# Patient Record
Sex: FEMALE | Race: ASIAN | NOT HISPANIC OR LATINO | Employment: FULL TIME | URBAN - METROPOLITAN AREA
[De-identification: names, ages, dates, MRNs, and addresses within clinical notes are randomized per-mention and may not be internally consistent; named-entity substitution may affect disease eponyms.]

---

## 2017-05-26 ENCOUNTER — TRANSCRIBE ORDERS (OUTPATIENT)
Dept: ADMINISTRATIVE | Facility: HOSPITAL | Age: 42
End: 2017-05-26

## 2017-05-26 DIAGNOSIS — Z12.31 VISIT FOR SCREENING MAMMOGRAM: Primary | ICD-10-CM

## 2017-06-02 ENCOUNTER — HOSPITAL ENCOUNTER (OUTPATIENT)
Dept: MAMMOGRAPHY | Facility: HOSPITAL | Age: 42
Discharge: HOME | End: 2017-06-02
Attending: NURSE PRACTITIONER | Admitting: NURSE PRACTITIONER

## 2017-06-02 DIAGNOSIS — Z12.31 VISIT FOR SCREENING MAMMOGRAM: ICD-10-CM

## 2017-06-02 PROCEDURE — 77063 BREAST TOMOSYNTHESIS BI: CPT | Performed by: RADIOLOGY

## 2017-06-02 PROCEDURE — G0202 SCR MAMMO BI INCL CAD: HCPCS

## 2017-06-02 PROCEDURE — 77067 SCR MAMMO BI INCL CAD: CPT | Performed by: RADIOLOGY

## 2017-06-02 PROCEDURE — 77063 BREAST TOMOSYNTHESIS BI: CPT

## 2017-12-11 ENCOUNTER — APPOINTMENT (OUTPATIENT)
Dept: LAB | Facility: HOSPITAL | Age: 42
End: 2017-12-11

## 2017-12-11 ENCOUNTER — TRANSCRIBE ORDERS (OUTPATIENT)
Dept: LAB | Facility: HOSPITAL | Age: 42
End: 2017-12-11

## 2017-12-11 DIAGNOSIS — E55.9 AVITAMINOSIS D: Primary | ICD-10-CM

## 2017-12-11 LAB — 25(OH)D3 SERPL-MCNC: 43.5 NG/ML

## 2017-12-11 PROCEDURE — 36415 COLL VENOUS BLD VENIPUNCTURE: CPT | Performed by: NURSE PRACTITIONER

## 2017-12-11 PROCEDURE — 82306 VITAMIN D 25 HYDROXY: CPT | Performed by: NURSE PRACTITIONER

## 2017-12-12 ENCOUNTER — TRANSCRIBE ORDERS (OUTPATIENT)
Dept: ADMINISTRATIVE | Facility: HOSPITAL | Age: 42
End: 2017-12-12

## 2017-12-12 DIAGNOSIS — R22.31 AXILLARY MASS, RIGHT: Primary | ICD-10-CM

## 2017-12-29 ENCOUNTER — TRANSCRIBE ORDERS (OUTPATIENT)
Dept: ADMINISTRATIVE | Facility: HOSPITAL | Age: 42
End: 2017-12-29

## 2017-12-29 DIAGNOSIS — M79.621 PAIN IN AXILLA, RIGHT: ICD-10-CM

## 2017-12-29 DIAGNOSIS — M79.89 AXILLARY SWELLING: ICD-10-CM

## 2017-12-29 DIAGNOSIS — R22.30 AXILLARY FULLNESS: Primary | ICD-10-CM

## 2018-01-26 ENCOUNTER — HOSPITAL ENCOUNTER (OUTPATIENT)
Dept: ULTRASOUND IMAGING | Facility: HOSPITAL | Age: 43
Discharge: HOME | End: 2018-01-26

## 2018-01-26 ENCOUNTER — HOSPITAL ENCOUNTER (OUTPATIENT)
Dept: MAMMOGRAPHY | Facility: HOSPITAL | Age: 43
Discharge: HOME | End: 2018-01-26
Attending: NURSE PRACTITIONER | Admitting: NURSE PRACTITIONER

## 2018-01-26 DIAGNOSIS — M79.621 PAIN IN AXILLA, RIGHT: ICD-10-CM

## 2018-01-26 DIAGNOSIS — M79.89 AXILLARY SWELLING: ICD-10-CM

## 2018-01-26 DIAGNOSIS — R22.30 AXILLARY FULLNESS: ICD-10-CM

## 2018-01-26 PROCEDURE — 76642 ULTRASOUND BREAST LIMITED: CPT

## 2018-01-26 PROCEDURE — 77065 DX MAMMO INCL CAD UNI: CPT

## 2018-01-26 PROCEDURE — 76642 ULTRASOUND BREAST LIMITED: CPT | Performed by: RADIOLOGY

## 2018-01-26 PROCEDURE — G0279 TOMOSYNTHESIS, MAMMO: HCPCS

## 2018-01-26 PROCEDURE — 77061 BREAST TOMOSYNTHESIS UNI: CPT | Performed by: RADIOLOGY

## 2018-01-26 PROCEDURE — 77065 DX MAMMO INCL CAD UNI: CPT | Performed by: RADIOLOGY

## 2018-07-30 ENCOUNTER — TRANSCRIBE ORDERS (OUTPATIENT)
Dept: ADMINISTRATIVE | Facility: HOSPITAL | Age: 43
End: 2018-07-30

## 2018-07-30 DIAGNOSIS — Z12.31 VISIT FOR SCREENING MAMMOGRAM: Primary | ICD-10-CM

## 2018-07-31 ENCOUNTER — HOSPITAL ENCOUNTER (OUTPATIENT)
Dept: MAMMOGRAPHY | Facility: HOSPITAL | Age: 43
Discharge: HOME | End: 2018-07-31
Attending: NURSE PRACTITIONER | Admitting: NURSE PRACTITIONER

## 2018-07-31 DIAGNOSIS — Z12.31 VISIT FOR SCREENING MAMMOGRAM: ICD-10-CM

## 2018-07-31 PROCEDURE — 77063 BREAST TOMOSYNTHESIS BI: CPT | Performed by: RADIOLOGY

## 2018-07-31 PROCEDURE — 77067 SCR MAMMO BI INCL CAD: CPT

## 2018-07-31 PROCEDURE — 77067 SCR MAMMO BI INCL CAD: CPT | Performed by: RADIOLOGY

## 2018-07-31 PROCEDURE — 77063 BREAST TOMOSYNTHESIS BI: CPT

## 2018-10-09 ENCOUNTER — HOSPITAL ENCOUNTER (EMERGENCY)
Facility: HOSPITAL | Age: 43
Discharge: HOME | End: 2018-10-09
Attending: EMERGENCY MEDICINE | Admitting: EMERGENCY MEDICINE

## 2018-10-09 VITALS
SYSTOLIC BLOOD PRESSURE: 170 MMHG | HEIGHT: 62 IN | BODY MASS INDEX: 32.76 KG/M2 | WEIGHT: 178 LBS | DIASTOLIC BLOOD PRESSURE: 117 MMHG | OXYGEN SATURATION: 92 % | HEART RATE: 97 BPM | RESPIRATION RATE: 18 BRPM | TEMPERATURE: 98 F

## 2018-10-09 DIAGNOSIS — I10 ESSENTIAL HYPERTENSION: Primary | ICD-10-CM

## 2018-10-09 LAB
ALBUMIN SERPL-MCNC: 4.64 G/DL (ref 3.2–4.8)
ALBUMIN/GLOB SERPL: 1.5 G/DL (ref 1.5–2.5)
ALP SERPL-CCNC: 95 U/L (ref 25–100)
ALT SERPL W P-5'-P-CCNC: 28 U/L (ref 7–40)
ANION GAP SERPL CALCULATED.3IONS-SCNC: 8 MMOL/L (ref 3–11)
AST SERPL-CCNC: 32 U/L (ref 0–33)
BASOPHILS # BLD AUTO: 0.07 10*3/MM3 (ref 0–0.2)
BASOPHILS NFR BLD AUTO: 0.6 % (ref 0–1)
BILIRUB SERPL-MCNC: 0.4 MG/DL (ref 0.3–1.2)
BUN BLD-MCNC: 13 MG/DL (ref 9–23)
BUN/CREAT SERPL: 13.7 (ref 7–25)
CALCIUM SPEC-SCNC: 9.1 MG/DL (ref 8.7–10.4)
CHLORIDE SERPL-SCNC: 101 MMOL/L (ref 99–109)
CO2 SERPL-SCNC: 26 MMOL/L (ref 20–31)
CREAT BLD-MCNC: 0.95 MG/DL (ref 0.6–1.3)
DEPRECATED RDW RBC AUTO: 41.5 FL (ref 37–54)
EOSINOPHIL # BLD AUTO: 0.33 10*3/MM3 (ref 0–0.3)
EOSINOPHIL NFR BLD AUTO: 2.8 % (ref 0–3)
ERYTHROCYTE [DISTWIDTH] IN BLOOD BY AUTOMATED COUNT: 12.8 % (ref 11.3–14.5)
GFR SERPL CREATININE-BSD FRML MDRD: 64 ML/MIN/1.73
GFR SERPL CREATININE-BSD FRML MDRD: 77 ML/MIN/1.73
GLOBULIN UR ELPH-MCNC: 3.1 GM/DL
GLUCOSE BLD-MCNC: 119 MG/DL (ref 70–100)
HCT VFR BLD AUTO: 47.9 % (ref 34.5–44)
HGB BLD-MCNC: 16.5 G/DL (ref 11.5–15.5)
HOLD SPECIMEN: NORMAL
IMM GRANULOCYTES # BLD: 0.03 10*3/MM3 (ref 0–0.03)
IMM GRANULOCYTES NFR BLD: 0.3 % (ref 0–0.6)
LYMPHOCYTES # BLD AUTO: 4.58 10*3/MM3 (ref 0.6–4.8)
LYMPHOCYTES NFR BLD AUTO: 38.8 % (ref 24–44)
MCH RBC QN AUTO: 30.7 PG (ref 27–31)
MCHC RBC AUTO-ENTMCNC: 34.4 G/DL (ref 32–36)
MCV RBC AUTO: 89.2 FL (ref 80–99)
MONOCYTES # BLD AUTO: 0.97 10*3/MM3 (ref 0–1)
MONOCYTES NFR BLD AUTO: 8.2 % (ref 0–12)
NEUTROPHILS # BLD AUTO: 5.86 10*3/MM3 (ref 1.5–8.3)
NEUTROPHILS NFR BLD AUTO: 49.6 % (ref 41–71)
PLATELET # BLD AUTO: 441 10*3/MM3 (ref 150–450)
PMV BLD AUTO: 10.5 FL (ref 6–12)
POTASSIUM BLD-SCNC: 4 MMOL/L (ref 3.5–5.5)
PROT SERPL-MCNC: 7.7 G/DL (ref 5.7–8.2)
RBC # BLD AUTO: 5.37 10*6/MM3 (ref 3.89–5.14)
SODIUM BLD-SCNC: 135 MMOL/L (ref 132–146)
WBC NRBC COR # BLD: 11.81 10*3/MM3 (ref 3.5–10.8)
WHOLE BLOOD HOLD SPECIMEN: NORMAL
WHOLE BLOOD HOLD SPECIMEN: NORMAL

## 2018-10-09 PROCEDURE — 96374 THER/PROPH/DIAG INJ IV PUSH: CPT

## 2018-10-09 PROCEDURE — 80053 COMPREHEN METABOLIC PANEL: CPT | Performed by: EMERGENCY MEDICINE

## 2018-10-09 PROCEDURE — 85025 COMPLETE CBC W/AUTO DIFF WBC: CPT | Performed by: EMERGENCY MEDICINE

## 2018-10-09 PROCEDURE — 99284 EMERGENCY DEPT VISIT MOD MDM: CPT

## 2018-10-09 RX ORDER — PAROXETINE 10 MG/1
10 TABLET, FILM COATED ORAL
Status: ON HOLD | COMMUNITY
Start: 2017-12-08 | End: 2022-06-06

## 2018-10-09 RX ORDER — METOPROLOL TARTRATE 50 MG/1
50 TABLET, FILM COATED ORAL EVERY 12 HOURS SCHEDULED
Qty: 30 TABLET | Refills: 0 | Status: ON HOLD | OUTPATIENT
Start: 2018-10-09 | End: 2022-06-06

## 2018-10-09 RX ORDER — CLONIDINE HYDROCHLORIDE 0.1 MG/1
0.1 TABLET ORAL ONCE
Status: COMPLETED | OUTPATIENT
Start: 2018-10-09 | End: 2018-10-09

## 2018-10-09 RX ORDER — TOPIRAMATE 100 MG/1
100 TABLET, FILM COATED ORAL
COMMUNITY
Start: 2017-12-08 | End: 2020-11-07

## 2018-10-09 RX ORDER — LABETALOL HYDROCHLORIDE 5 MG/ML
10 INJECTION, SOLUTION INTRAVENOUS ONCE
Status: COMPLETED | OUTPATIENT
Start: 2018-10-09 | End: 2018-10-09

## 2018-10-09 RX ORDER — METHYLPHENIDATE HYDROCHLORIDE 27 MG/1
27 TABLET, EXTENDED RELEASE ORAL
COMMUNITY
Start: 2017-11-29 | End: 2020-11-07

## 2018-10-09 RX ORDER — LOSARTAN POTASSIUM AND HYDROCHLOROTHIAZIDE 25; 100 MG/1; MG/1
1 TABLET ORAL
Status: ON HOLD | COMMUNITY
Start: 2017-11-26 | End: 2022-06-06

## 2018-10-09 RX ORDER — SODIUM CHLORIDE 0.9 % (FLUSH) 0.9 %
10 SYRINGE (ML) INJECTION AS NEEDED
Status: DISCONTINUED | OUTPATIENT
Start: 2018-10-09 | End: 2018-10-09 | Stop reason: HOSPADM

## 2018-10-09 RX ORDER — MONTELUKAST SODIUM 10 MG/1
10 TABLET ORAL
Status: ON HOLD | COMMUNITY
End: 2022-06-06

## 2018-10-09 RX ORDER — FLUOXETINE 20 MG/1
TABLET, FILM COATED ORAL
COMMUNITY
End: 2020-11-07

## 2018-10-09 RX ADMIN — CLONIDINE HYDROCHLORIDE 0.1 MG: 0.1 TABLET ORAL at 18:06

## 2018-10-09 RX ADMIN — LABETALOL HYDROCHLORIDE 10 MG: 5 INJECTION INTRAVENOUS at 17:31

## 2018-10-09 NOTE — ED PROVIDER NOTES
[facility] EMERGENCY DEPARTMENT    eMERGENCY dEPARTMENT eNCOUnter      Pt Name: Rebekah Ga  MRN: [id number]  YOB: 1975  Date of evaluation: 10/9/2018  Provider: Jozef Becerra DO    CHIEF COMPLAINT       Chief Complaint   Patient presents with   • Hypertension         HISTORY OF PRESENT ILLNESS  (Location/Symptom, Timing/Onset, Context/Setting, Quality, Duration, Modifying Factors, Severity.)   Rebekah Ga is a 43 y.o. female who presents to the emergency department for evaluation of elevated blood pressure today today will she was at work.  She notes earlier filling lightheaded, somewhat dizzy, generally fatigued which she states she has no several last few days.  The patient does take blood pressure medication has been compliant with her medicines, although she does miss a couple doses here and there.  The patient denies chest pain or palpitations, earlier today she notes she still felt often check her blood pressure this afternoon was noted to be elevated 180s systolic over low 100s diastolic.  Denies any headache, no loss of vision, no unilateral weakness/numbness or tingling.  No fall, head injury or trauma.  Denies abdominal pain, no urinary/bowel complaints.       Nursing notes were reviewed.    REVIEW OF SYSTEMS    (2-9 systems for level 4, 10 or more for level 5)   ROS:  General:  No fevers, no chills, + generalized weakness  Cardiovascular:  No chest pain, no palpitations  Respiratory:  No shortness of breath, no cough, no wheezing  Gastrointestinal:  No pain, no nausea, no vomiting, no diarrhea  Musculoskeletal:  No muscle pain, no joint pain  Skin:  No rash, no easy bruising  Neurologic:  No speech problems, no headache, no extremity numbness, no extremity tingling, no extremity weakness  Psychiatric:  No anxiety  Genitourinary:  No dysuria, no hematuria    Except as noted above the remainder of the review of systems was reviewed and negative.       PAST MEDICAL  HISTORY   History reviewed. No pertinent past medical history.      SURGICAL HISTORY     History reviewed. No pertinent surgical history.      CURRENT MEDICATIONS        Medication List      START taking these medications    metoprolol tartrate 50 MG tablet  Commonly known as:  LOPRESSOR  Take 1 tablet by mouth Every 12 (Twelve) Hours.        CONTINUE taking these medications    CALCIUM CARBONATE-VITAMIN D3 PO     FLUoxetine 20 MG tablet  Commonly known as:  PROzac     losartan-hydrochlorothiazide 100-25 MG per tablet  Commonly known as:  HYZAAR     Methylphenidate HCl ER 27 MG tablet sustained-release 24 hour     montelukast 10 MG tablet  Commonly known as:  SINGULAIR     OXTELLAR  MG tablet sustained-release 24 hour  Generic drug:  OXcarbazepine ER     PARoxetine 10 MG tablet  Commonly known as:  PAXIL     topiramate 100 MG tablet  Commonly known as:  TOPAMAX          ALLERGIES     Aspirin and Penicillins    FAMILY HISTORY       Family History   Problem Relation Age of Onset   • Breast cancer Mother 76   • Ovarian cancer Neg Hx           SOCIAL HISTORY       Social History     Social History   • Marital status:      Social History Main Topics   • Smoking status: Never Smoker   • Smokeless tobacco: Never Used   • Alcohol use No   • Drug use: No   • Sexual activity: Defer     Other Topics Concern   • Not on file         PHYSICAL EXAM    (up to 7 for level 4, 8 or more for level 5)     Vitals:    10/09/18 1605 10/09/18 1730 10/09/18 1857 10/09/18 1900   BP: (!) 184/109 (!) 158/117 (!) 149/113 (!) 165/112   BP Location:       Patient Position:       Pulse: 97      Resp: 18      Temp:       TempSrc:       SpO2:  94%  96%   Weight:       Height:           Physical Exam  General :Patient is awake, alert, oriented, in no acute distress, nontoxic appearing  HEENT: Pupils are equally round and reactive to light, EOMI, conjunctivae clear, sclerae white, there is no injection no icterus.  Oral mucosa is moist, no  exudate. Uvula is midline  Neck: Neck is supple, full range of motion, trachea midline  Cardiac: Heart regular rate, rhythm, no murmurs, rubs, or gallops  Lungs: Lungs are clear to auscultation, there is no wheezing, rhonchi, or rales. There is no use of accessory muscles.  Chest wall: There is no tenderness to palpation over the chest wall or over ribs  Abdomen: Abdomen is soft, nontender, nondistended. There is no firm or pulsatile masses, no rebound rigidity or guarding.   Musculoskeletal: 5 out of 5 strength in all 4 extremities.  No focal muscle deficits are appreciated  Neuro: Motor intact, sensory intact, level of consciousness is normal, cerebellar function is normal GCS 15, no focal neurological deficit, good strength in bilateral upper extremities with good , no paresthesias.  5/5 in bilateral lower extremities.  Reflexes intact.      DIAGNOSTIC RESULTS     EKG: All EKG's are interpreted by the Emergency Department Physician who either signs or Co-signs this chart in the absence of a cardiologist.    No orders to display       RADIOLOGY:   Non-plain film images such as CT, Ultrasound and MRI are read by the radiologist. Plain radiographic images are visualized and preliminarily interpreted by the emergency physician with the below findings:      [] Radiologist's Report Reviewed:  No orders to display         ED BEDSIDE ULTRASOUND:   Performed by ED Physician - none    LABS:  Labs Reviewed   COMPREHENSIVE METABOLIC PANEL - Abnormal; Notable for the following:        Result Value    Glucose 119 (*)     All other components within normal limits    Narrative:     National Kidney [employer] Guidelines    Stage     Description        GFR  1         Normal or High     90+  2         Mild decrease      60-89  3         Moderate decrease  30-59  4         Severe decrease    15-29  5         Kidney failure     <15    The MDRD GFR formula is only valid for adults with stable renal function between ages 18 and 70.    CBC WITH AUTO DIFFERENTIAL - Abnormal; Notable for the following:     WBC 11.81 (*)     RBC 5.37 (*)     Hemoglobin 16.5 (*)     Hematocrit 47.9 (*)     Eosinophils, Absolute 0.33 (*)     All other components within normal limits   RAINBOW DRAW    Narrative:     The following orders were created for panel order [place] Draw.  Procedure                               Abnormality         Status                     ---------                               -----------         ------                     Light Blue Top[[id number]]                                    Final result               Green Top (Gel)[[id number]]                                                              Lavender Top[[id number]]                                      Final result               Gold Top - SST[[id number]]                                    Final result               Green Top (No Gel)[[id number]]                                                             Please view results for these tests on the individual orders.   LIGHT BLUE TOP   LAVENDER TOP   GOLD TOP - SST   CBC AND DIFFERENTIAL    Narrative:     The following orders were created for panel order CBC & Differential.  Procedure                               Abnormality         Status                     ---------                               -----------         ------                     CBC Auto Differential[[id number]]        Abnormal            Final result                 Please view results for these tests on the individual orders.       I have reviewed and interpreted all of the currently available lab results from this visit (if applicable):  Results for orders placed or performed during the hospital encounter of 10/09/18   Comprehensive Metabolic Panel   Result Value Ref Range    Glucose 119 (H) 70 - 100 mg/dL    BUN 13 9 - 23 mg/dL    Creatinine 0.95 0.60 - 1.30 mg/dL    Sodium 135 132 - 146 mmol/L    Potassium 4.0 3.5 - 5.5 mmol/L    Chloride 101 99 - 109 mmol/L    CO2 26.0 20.0  - 31.0 mmol/L    Calcium 9.1 8.7 - 10.4 mg/dL    Total Protein 7.7 5.7 - 8.2 g/dL    Albumin 4.64 3.20 - 4.80 g/dL    ALT (SGPT) 28 7 - 40 U/L    AST (SGOT) 32 0 - 33 U/L    Alkaline Phosphatase 95 25 - 100 U/L    Total Bilirubin 0.4 0.3 - 1.2 mg/dL    eGFR Non African Amer 64 >60 mL/min/1.73    eGFR  African Amer 77 >60 mL/min/1.73    Globulin 3.1 gm/dL    A/G Ratio 1.5 1.5 - 2.5 g/dL    BUN/Creatinine Ratio 13.7 7.0 - 25.0    Anion Gap 8.0 3.0 - 11.0 mmol/L   CBC Auto Differential   Result Value Ref Range    WBC 11.81 (H) 3.50 - 10.80 10*3/mm3    RBC 5.37 (H) 3.89 - 5.14 10*6/mm3    Hemoglobin 16.5 (H) 11.5 - 15.5 g/dL    Hematocrit 47.9 (H) 34.5 - 44.0 %    MCV 89.2 80.0 - 99.0 fL    MCH 30.7 27.0 - 31.0 pg    MCHC 34.4 32.0 - 36.0 g/dL    RDW 12.8 11.3 - 14.5 %    RDW-SD 41.5 37.0 - 54.0 fl    MPV 10.5 6.0 - 12.0 fL    Platelets 441 150 - 450 10*3/mm3    Neutrophil % 49.6 41.0 - 71.0 %    Lymphocyte % 38.8 24.0 - 44.0 %    Monocyte % 8.2 0.0 - 12.0 %    Eosinophil % 2.8 0.0 - 3.0 %    Basophil % 0.6 0.0 - 1.0 %    Immature Grans % 0.3 0.0 - 0.6 %    Neutrophils, Absolute 5.86 1.50 - 8.30 10*3/mm3    Lymphocytes, Absolute 4.58 0.60 - 4.80 10*3/mm3    Monocytes, Absolute 0.97 0.00 - 1.00 10*3/mm3    Eosinophils, Absolute 0.33 (H) 0.00 - 0.30 10*3/mm3    Basophils, Absolute 0.07 0.00 - 0.20 10*3/mm3    Immature Grans, Absolute 0.03 0.00 - 0.03 10*3/mm3   Light Blue Top   Result Value Ref Range    Extra Tube hold for add-on    Lavender Top   Result Value Ref Range    Extra Tube hold for add-on    Gold Top - SST   Result Value Ref Range    Extra Tube Hold for add-ons.         All other labs were within normal range or not returned as of this dictation.    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  Neurology alert and oriented without any focal neurological deficit, blood pressure is elevated around 180 over 100s.with discussed given her consistently elevated pressures with mild to moderate symptoms that we would obtain IV, labs, give her labetalol 10 mg, clonidine 0.1 mg.  Labs unremarkable, kidney function intact.  Patient denies a family history of any neurological issues, no history of aneurysms.  Denies a sudden onset of headache, no vision changes.  On reevaluation, systolic pressures improved, diastolic pressure has improved somewhat, but still remains elevated.  I discussed the patient should continue with her current antihypertensive medications but we should add on additional medicine, beta blocker to help further improve her blood pressure.  No sudden onset headache, no focal neurological deficit.  She will continue to monitor and write down her blood pressures in the morning, afternoon and night until she can follow-up with her PCP in a couple days for reevaluation and recheck of her pressures.  If she has any pressures greater than 220/120 she should return back to the emergency department for reevaluation.  The patient and her significant other understand return precautions discussed and are in agreement. The patient will follow-up with their PCP in 1-2 days for reevaluation.  If the patient or family members have any further concerns or any worsening symptoms they will return to the ED for reevaluation.          PROCEDURES:  Procedures    CRITICAL CARE TIME    Total Critical Care time was 0 minutes, excluding separately reportable procedures.   There was a high probability of clinically significant/life threatening deterioration in the patient's condition which required my  urgent intervention.      FINAL IMPRESSION      1. Essential hypertension          DISPOSITION/PLAN     ED Disposition     ED Disposition Condition Comment    Discharge Stable           PATIENT REFERRED TO:  Bonny Quinonez  [address]  [address] [place]  [place] [address] [phone]    In 2 days  For reevaluation, recheck of her blood pressure, medication adjustments as needed.  Also referral to your neurologist    [facility] Emergency Department  [address] [facility] [address] [facility] [address] [phone]    If symptoms worsen, or if you are taking your blood pressures at home and they continue to be elevated. Greater than 220/120. Or is any of your symptoms worsen.      DISCHARGE MEDICATIONS:     Medication List      START taking these medications    metoprolol tartrate 50 MG tablet  Commonly known as:  LOPRESSOR  Take 1 tablet by mouth Every 12 (Twelve) Hours.        CONTINUE taking these medications    CALCIUM CARBONATE-VITAMIN D3 PO     FLUoxetine 20 MG tablet  Commonly known as:  PROzac     losartan-hydrochlorothiazide 100-25 MG per tablet  Commonly known as:  HYZAAR     Methylphenidate HCl ER 27 MG tablet sustained-release 24 hour     montelukast 10 MG tablet  Commonly known as:  SINGULAIR     OXTELLAR  MG tablet sustained-release 24 hour  Generic drug:  OXcarbazepine ER     PARoxetine 10 MG tablet  Commonly known as:  PAXIL     topiramate 100 MG tablet  Commonly known as:  TOPAMAX          Comment: Please note this report has been produced using speech recognition software and may contain errors related to that system including errors in grammar, punctuation, and spelling, as well as words and phrases that may be inappropriate. If there are any questions or concerns please feel free to contact the dictating provider for clarification.    Jozef Becerra DO  Attending Emergency Physician               Jozef Becerra DO  10/09/18 9221

## 2019-01-25 ENCOUNTER — LAB (OUTPATIENT)
Dept: LAB | Facility: HOSPITAL | Age: 44
End: 2019-01-25

## 2019-01-25 ENCOUNTER — TRANSCRIBE ORDERS (OUTPATIENT)
Dept: LAB | Facility: HOSPITAL | Age: 44
End: 2019-01-25

## 2019-01-25 DIAGNOSIS — R53.83 FATIGUE, UNSPECIFIED TYPE: Primary | ICD-10-CM

## 2019-01-25 DIAGNOSIS — R53.83 FATIGUE, UNSPECIFIED TYPE: ICD-10-CM

## 2019-01-25 LAB
25(OH)D3 SERPL-MCNC: 36.4 NG/ML
ALBUMIN SERPL-MCNC: 4.61 G/DL (ref 3.2–4.8)
ALBUMIN/GLOB SERPL: 1.8 G/DL (ref 1.5–2.5)
ALP SERPL-CCNC: 79 U/L (ref 25–100)
ALT SERPL W P-5'-P-CCNC: 19 U/L (ref 7–40)
ANION GAP SERPL CALCULATED.3IONS-SCNC: 7 MMOL/L (ref 3–11)
AST SERPL-CCNC: 19 U/L (ref 0–33)
BILIRUB SERPL-MCNC: 0.8 MG/DL (ref 0.3–1.2)
BUN BLD-MCNC: 15 MG/DL (ref 9–23)
BUN/CREAT SERPL: 17.6 (ref 7–25)
CALCIUM SPEC-SCNC: 9.6 MG/DL (ref 8.7–10.4)
CHLORIDE SERPL-SCNC: 105 MMOL/L (ref 99–109)
CO2 SERPL-SCNC: 25 MMOL/L (ref 20–31)
CREAT BLD-MCNC: 0.85 MG/DL (ref 0.6–1.3)
DEPRECATED RDW RBC AUTO: 44.1 FL (ref 37–54)
ERYTHROCYTE [DISTWIDTH] IN BLOOD BY AUTOMATED COUNT: 13.4 % (ref 11.3–14.5)
GFR SERPL CREATININE-BSD FRML MDRD: 72 ML/MIN/1.73
GFR SERPL CREATININE-BSD FRML MDRD: 88 ML/MIN/1.73
GLOBULIN UR ELPH-MCNC: 2.6 GM/DL
GLUCOSE BLD-MCNC: 94 MG/DL (ref 70–100)
HBA1C MFR BLD: 5.8 % (ref 4.8–5.6)
HCT VFR BLD AUTO: 46.4 % (ref 34.5–44)
HGB BLD-MCNC: 15.9 G/DL (ref 11.5–15.5)
MCH RBC QN AUTO: 31.4 PG (ref 27–31)
MCHC RBC AUTO-ENTMCNC: 34.3 G/DL (ref 32–36)
MCV RBC AUTO: 91.5 FL (ref 80–99)
PLATELET # BLD AUTO: 431 10*3/MM3 (ref 150–450)
PMV BLD AUTO: 10.1 FL (ref 6–12)
POTASSIUM BLD-SCNC: 4.3 MMOL/L (ref 3.5–5.5)
PROT SERPL-MCNC: 7.2 G/DL (ref 5.7–8.2)
RBC # BLD AUTO: 5.07 10*6/MM3 (ref 3.89–5.14)
SODIUM BLD-SCNC: 137 MMOL/L (ref 132–146)
T4 FREE SERPL-MCNC: 1.13 NG/DL (ref 0.89–1.76)
TSH SERPL DL<=0.05 MIU/L-ACNC: 2.17 MIU/ML (ref 0.35–5.35)
VIT B12 BLD-MCNC: 625 PG/ML (ref 211–911)
WBC NRBC COR # BLD: 10.84 10*3/MM3 (ref 3.5–10.8)

## 2019-01-25 PROCEDURE — 83036 HEMOGLOBIN GLYCOSYLATED A1C: CPT

## 2019-01-25 PROCEDURE — 84439 ASSAY OF FREE THYROXINE: CPT

## 2019-01-25 PROCEDURE — 82306 VITAMIN D 25 HYDROXY: CPT

## 2019-01-25 PROCEDURE — 82607 VITAMIN B-12: CPT

## 2019-01-25 PROCEDURE — 85027 COMPLETE CBC AUTOMATED: CPT

## 2019-01-25 PROCEDURE — 36415 COLL VENOUS BLD VENIPUNCTURE: CPT

## 2019-01-25 PROCEDURE — 84443 ASSAY THYROID STIM HORMONE: CPT

## 2019-01-25 PROCEDURE — 80053 COMPREHEN METABOLIC PANEL: CPT

## 2019-08-16 ENCOUNTER — LAB (OUTPATIENT)
Dept: LAB | Facility: HOSPITAL | Age: 44
End: 2019-08-16

## 2019-08-16 ENCOUNTER — TRANSCRIBE ORDERS (OUTPATIENT)
Dept: LAB | Facility: HOSPITAL | Age: 44
End: 2019-08-16

## 2019-08-16 DIAGNOSIS — R23.2 HOT FLASHES: Primary | ICD-10-CM

## 2019-08-16 DIAGNOSIS — R23.2 HOT FLASHES: ICD-10-CM

## 2019-08-16 LAB
DEPRECATED RDW RBC AUTO: 46.6 FL (ref 37–54)
ERYTHROCYTE [DISTWIDTH] IN BLOOD BY AUTOMATED COUNT: 13.4 % (ref 12.3–15.4)
HCT VFR BLD AUTO: 48.8 % (ref 34–46.6)
HGB BLD-MCNC: 16 G/DL (ref 12–15.9)
MCH RBC QN AUTO: 30.9 PG (ref 26.6–33)
MCHC RBC AUTO-ENTMCNC: 32.8 G/DL (ref 31.5–35.7)
MCV RBC AUTO: 94.2 FL (ref 79–97)
PLATELET # BLD AUTO: 424 10*3/MM3 (ref 140–450)
PMV BLD AUTO: 10.3 FL (ref 6–12)
RBC # BLD AUTO: 5.18 10*6/MM3 (ref 3.77–5.28)
TSH SERPL DL<=0.05 MIU/L-ACNC: 2.8 MIU/ML (ref 0.27–4.2)
WBC NRBC COR # BLD: 9.76 10*3/MM3 (ref 3.4–10.8)

## 2019-08-16 PROCEDURE — 84443 ASSAY THYROID STIM HORMONE: CPT

## 2019-08-16 PROCEDURE — 36415 COLL VENOUS BLD VENIPUNCTURE: CPT

## 2019-08-16 PROCEDURE — 85027 COMPLETE CBC AUTOMATED: CPT

## 2020-02-05 ENCOUNTER — TRANSCRIBE ORDERS (OUTPATIENT)
Dept: ADMINISTRATIVE | Facility: HOSPITAL | Age: 45
End: 2020-02-05

## 2020-02-05 DIAGNOSIS — M79.621 PAIN IN RIGHT AXILLA: ICD-10-CM

## 2020-02-05 DIAGNOSIS — R22.31 LUMP OF AXILLA, RIGHT: Primary | ICD-10-CM

## 2020-02-05 DIAGNOSIS — N63.31 LUMP OF AXILLARY TAIL OF RIGHT BREAST: ICD-10-CM

## 2020-10-22 ENCOUNTER — TRANSCRIBE ORDERS (OUTPATIENT)
Dept: ADMINISTRATIVE | Facility: HOSPITAL | Age: 45
End: 2020-10-22

## 2020-10-22 DIAGNOSIS — Z12.31 VISIT FOR SCREENING MAMMOGRAM: Primary | ICD-10-CM

## 2020-11-07 ENCOUNTER — APPOINTMENT (OUTPATIENT)
Dept: CT IMAGING | Facility: HOSPITAL | Age: 45
End: 2020-11-07

## 2020-11-07 ENCOUNTER — APPOINTMENT (OUTPATIENT)
Dept: GENERAL RADIOLOGY | Facility: HOSPITAL | Age: 45
End: 2020-11-07

## 2020-11-07 ENCOUNTER — HOSPITAL ENCOUNTER (EMERGENCY)
Facility: HOSPITAL | Age: 45
Discharge: HOME | End: 2020-11-07
Attending: EMERGENCY MEDICINE | Admitting: EMERGENCY MEDICINE

## 2020-11-07 VITALS
HEIGHT: 62 IN | WEIGHT: 176 LBS | TEMPERATURE: 97.8 F | DIASTOLIC BLOOD PRESSURE: 96 MMHG | HEART RATE: 80 BPM | OXYGEN SATURATION: 97 % | BODY MASS INDEX: 32.39 KG/M2 | RESPIRATION RATE: 15 BRPM | SYSTOLIC BLOOD PRESSURE: 154 MMHG

## 2020-11-07 DIAGNOSIS — S16.1XXA ACUTE CERVICAL MYOFASCIAL STRAIN, INITIAL ENCOUNTER: ICD-10-CM
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
## 2020-11-07 DIAGNOSIS — S80.11XA CONTUSION OF RIGHT TIBIA: ICD-10-CM

## 2020-11-07 PROCEDURE — 99283 EMERGENCY DEPT VISIT LOW MDM: CPT

## 2020-11-07 PROCEDURE — 72125 CT NECK SPINE W/O DYE: CPT

## 2020-11-07 PROCEDURE — 73590 X-RAY EXAM OF LOWER LEG: CPT

## 2020-11-07 RX ORDER — CYCLOBENZAPRINE HCL 10 MG
10 TABLET ORAL 3 TIMES DAILY PRN
Qty: 12 TABLET | Refills: 0 | Status: SHIPPED | OUTPATIENT
Start: 2020-11-07 | End: 2020-11-07 | Stop reason: SDUPTHER

## 2020-11-07 RX ORDER — CYCLOBENZAPRINE HCL 10 MG
10 TABLET ORAL 3 TIMES DAILY PRN
Qty: 12 TABLET | Refills: 0 | Status: ON HOLD | OUTPATIENT
Start: 2020-11-07 | End: 2022-06-06
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
## 2020-11-18 ENCOUNTER — APPOINTMENT (OUTPATIENT)
Dept: PREADMISSION TESTING | Facility: HOSPITAL | Age: 45
End: 2020-11-18

## 2020-11-18 PROCEDURE — C9803 HOPD COVID-19 SPEC COLLECT: HCPCS

## 2020-11-18 PROCEDURE — U0004 COV-19 TEST NON-CDC HGH THRU: HCPCS

## 2020-11-19 ENCOUNTER — HOSPITAL ENCOUNTER (OUTPATIENT)
Dept: MAMMOGRAPHY | Facility: HOSPITAL | Age: 45
Discharge: HOME | End: 2020-11-19

## 2020-11-19 ENCOUNTER — HOSPITAL ENCOUNTER (OUTPATIENT)
Dept: ULTRASOUND IMAGING | Facility: HOSPITAL | Age: 45
Discharge: HOME | End: 2020-11-19

## 2020-11-19 DIAGNOSIS — N63.31 LUMP OF AXILLARY TAIL OF RIGHT BREAST: ICD-10-CM

## 2020-11-19 LAB — SARS-COV-2 RNA RESP QL NAA+PROBE: NOT DETECTED

## 2020-11-19 PROCEDURE — 76642 ULTRASOUND BREAST LIMITED: CPT

## 2020-11-19 PROCEDURE — 77062 BREAST TOMOSYNTHESIS BI: CPT | Performed by: RADIOLOGY

## 2020-11-19 PROCEDURE — G0279 TOMOSYNTHESIS, MAMMO: HCPCS

## 2020-11-19 PROCEDURE — 77066 DX MAMMO INCL CAD BI: CPT

## 2020-11-19 PROCEDURE — 77066 DX MAMMO INCL CAD BI: CPT | Performed by: RADIOLOGY

## 2020-11-19 PROCEDURE — 76642 ULTRASOUND BREAST LIMITED: CPT | Performed by: RADIOLOGY

## 2020-11-20 ENCOUNTER — LAB REQUISITION (OUTPATIENT)
Dept: LAB | Facility: HOSPITAL | Age: 45
End: 2020-11-20

## 2020-11-20 ENCOUNTER — APPOINTMENT (OUTPATIENT)
Dept: MAMMOGRAPHY | Facility: HOSPITAL | Age: 45
End: 2020-11-20

## 2020-11-20 DIAGNOSIS — M67.40 GANGLION, UNSPECIFIED SITE: ICD-10-CM

## 2020-11-20 PROCEDURE — 88304 TISSUE EXAM BY PATHOLOGIST: CPT | Performed by: PLASTIC SURGERY

## 2020-11-23 LAB
CYTO UR: NORMAL
LAB AP CASE REPORT: NORMAL
LAB AP CLINICAL INFORMATION: NORMAL
PATH REPORT.FINAL DX SPEC: NORMAL
PATH REPORT.GROSS SPEC: NORMAL

## 2020-12-20 ENCOUNTER — APPOINTMENT (OUTPATIENT)
Dept: PREADMISSION TESTING | Facility: HOSPITAL | Age: 45
End: 2020-12-20

## 2021-01-26 ENCOUNTER — APPOINTMENT (OUTPATIENT)
Dept: MAMMOGRAPHY | Facility: HOSPITAL | Age: 46
End: 2021-01-26

## 2022-02-14 ENCOUNTER — SPECIALTY PHARMACY (OUTPATIENT)
Dept: PHARMACY | Facility: TELEHEALTH | Age: 47
End: 2022-02-14

## 2022-02-17 ENCOUNTER — SPECIALTY PHARMACY (OUTPATIENT)
Dept: PHARMACY | Facility: TELEHEALTH | Age: 47
End: 2022-02-17

## 2022-02-21 ENCOUNTER — SPECIALTY PHARMACY (OUTPATIENT)
Dept: PHARMACY | Facility: TELEHEALTH | Age: 47
End: 2022-02-21

## 2022-02-21 NOTE — PROGRESS NOTES
Left messages for pt x4. Sent [employer] message that was not read. Salty profiled the RX's.     Mildred Rodriguez, Pharmacy Technician  Specialty Pharmacy Technician

## 2022-06-05 ENCOUNTER — HOSPITAL ENCOUNTER (INPATIENT)
Facility: HOSPITAL | Age: 47
LOS: 4 days | Discharge: HOME | End: 2022-06-10
Attending: EMERGENCY MEDICINE | Admitting: STUDENT IN AN ORGANIZED HEALTH CARE EDUCATION/TRAINING PROGRAM

## 2022-06-05 ENCOUNTER — APPOINTMENT (OUTPATIENT)
Dept: GENERAL RADIOLOGY | Facility: HOSPITAL | Age: 47
End: 2022-06-05

## 2022-06-05 DIAGNOSIS — K85.90 ACUTE PANCREATITIS, UNSPECIFIED COMPLICATION STATUS, UNSPECIFIED PANCREATITIS TYPE: Primary | ICD-10-CM

## 2022-06-05 DIAGNOSIS — R10.10 ACUTE UPPER ABDOMINAL PAIN: ICD-10-CM

## 2022-06-05 DIAGNOSIS — R11.2 NAUSEA AND VOMITING, UNSPECIFIED VOMITING TYPE: ICD-10-CM

## 2022-06-05 LAB
ALBUMIN SERPL-MCNC: 4.3 G/DL (ref 3.5–5.2)
ALBUMIN/GLOB SERPL: 1.3 G/DL
ALP SERPL-CCNC: 94 U/L (ref 39–117)
ALT SERPL W P-5'-P-CCNC: 33 U/L (ref 1–33)
ANION GAP SERPL CALCULATED.3IONS-SCNC: 10 MMOL/L (ref 5–15)
AST SERPL-CCNC: 31 U/L (ref 1–32)
B-HCG UR QL: NEGATIVE
BACTERIA UR QL AUTO: ABNORMAL /HPF
BASOPHILS # BLD AUTO: 0.09 10*3/MM3 (ref 0–0.2)
BASOPHILS NFR BLD AUTO: 0.5 % (ref 0–1.5)
BILIRUB SERPL-MCNC: 0.6 MG/DL (ref 0–1.2)
BILIRUB UR QL STRIP: NEGATIVE
BUN SERPL-MCNC: 10 MG/DL (ref 6–20)
BUN/CREAT SERPL: 13.3 (ref 7–25)
CALCIUM SPEC-SCNC: 9.1 MG/DL (ref 8.6–10.5)
CHLORIDE SERPL-SCNC: 100 MMOL/L (ref 98–107)
CLARITY UR: CLEAR
CO2 SERPL-SCNC: 26 MMOL/L (ref 22–29)
COLOR UR: YELLOW
CREAT SERPL-MCNC: 0.75 MG/DL (ref 0.57–1)
DEPRECATED RDW RBC AUTO: 40.7 FL (ref 37–54)
EGFRCR SERPLBLD CKD-EPI 2021: 97.7 ML/MIN/1.73
EOSINOPHIL # BLD AUTO: 0.35 10*3/MM3 (ref 0–0.4)
EOSINOPHIL NFR BLD AUTO: 2.1 % (ref 0.3–6.2)
ERYTHROCYTE [DISTWIDTH] IN BLOOD BY AUTOMATED COUNT: 12.4 % (ref 12.3–15.4)
EXPIRATION DATE: NORMAL
GLOBULIN UR ELPH-MCNC: 3.4 GM/DL
GLUCOSE SERPL-MCNC: 121 MG/DL (ref 65–99)
GLUCOSE UR STRIP-MCNC: NEGATIVE MG/DL
HCT VFR BLD AUTO: 47 % (ref 34–46.6)
HGB BLD-MCNC: 16.1 G/DL (ref 12–15.9)
HGB UR QL STRIP.AUTO: ABNORMAL
HOLD SPECIMEN: NORMAL
HOLD SPECIMEN: NORMAL
HYALINE CASTS UR QL AUTO: ABNORMAL /LPF
IMM GRANULOCYTES # BLD AUTO: 0.08 10*3/MM3 (ref 0–0.05)
IMM GRANULOCYTES NFR BLD AUTO: 0.5 % (ref 0–0.5)
INTERNAL NEGATIVE CONTROL: NEGATIVE
INTERNAL POSITIVE CONTROL: POSITIVE
KETONES UR QL STRIP: NEGATIVE
LEUKOCYTE ESTERASE UR QL STRIP.AUTO: NEGATIVE
LIPASE SERPL-CCNC: 401 U/L (ref 13–60)
LYMPHOCYTES # BLD AUTO: 3.77 10*3/MM3 (ref 0.7–3.1)
LYMPHOCYTES NFR BLD AUTO: 22.5 % (ref 19.6–45.3)
Lab: NORMAL
MCH RBC QN AUTO: 30.9 PG (ref 26.6–33)
MCHC RBC AUTO-ENTMCNC: 34.3 G/DL (ref 31.5–35.7)
MCV RBC AUTO: 90.2 FL (ref 79–97)
MONOCYTES # BLD AUTO: 1.78 10*3/MM3 (ref 0.1–0.9)
MONOCYTES NFR BLD AUTO: 10.6 % (ref 5–12)
NEUTROPHILS NFR BLD AUTO: 10.65 10*3/MM3 (ref 1.7–7)
NEUTROPHILS NFR BLD AUTO: 63.8 % (ref 42.7–76)
NITRITE UR QL STRIP: NEGATIVE
NRBC BLD AUTO-RTO: 0 /100 WBC (ref 0–0.2)
PH UR STRIP.AUTO: 7 [PH] (ref 5–8)
PLATELET # BLD AUTO: 413 10*3/MM3 (ref 140–450)
PMV BLD AUTO: 9.7 FL (ref 6–12)
POTASSIUM SERPL-SCNC: 3.8 MMOL/L (ref 3.5–5.2)
PROT SERPL-MCNC: 7.7 G/DL (ref 6–8.5)
PROT UR QL STRIP: NEGATIVE
RBC # BLD AUTO: 5.21 10*6/MM3 (ref 3.77–5.28)
RBC # UR STRIP: ABNORMAL /HPF
REF LAB TEST METHOD: ABNORMAL
SODIUM SERPL-SCNC: 136 MMOL/L (ref 136–145)
SP GR UR STRIP: 1.01 (ref 1–1.03)
SQUAMOUS #/AREA URNS HPF: ABNORMAL /HPF
TROPONIN T SERPL-MCNC: <0.01 NG/ML (ref 0–0.03)
UROBILINOGEN UR QL STRIP: ABNORMAL
WBC # UR STRIP: ABNORMAL /HPF
WBC NRBC COR # BLD: 16.72 10*3/MM3 (ref 3.4–10.8)
WHOLE BLOOD HOLD COAG: NORMAL
WHOLE BLOOD HOLD SPECIMEN: NORMAL

## 2022-06-05 PROCEDURE — 84478 ASSAY OF TRIGLYCERIDES: CPT | Performed by: FAMILY MEDICINE

## 2022-06-05 PROCEDURE — 81001 URINALYSIS AUTO W/SCOPE: CPT | Performed by: EMERGENCY MEDICINE

## 2022-06-05 PROCEDURE — 84484 ASSAY OF TROPONIN QUANT: CPT | Performed by: EMERGENCY MEDICINE

## 2022-06-05 PROCEDURE — 83735 ASSAY OF MAGNESIUM: CPT | Performed by: NURSE PRACTITIONER

## 2022-06-05 PROCEDURE — 71045 X-RAY EXAM CHEST 1 VIEW: CPT

## 2022-06-05 PROCEDURE — 93005 ELECTROCARDIOGRAM TRACING: CPT | Performed by: EMERGENCY MEDICINE

## 2022-06-05 PROCEDURE — 83605 ASSAY OF LACTIC ACID: CPT | Performed by: NURSE PRACTITIONER

## 2022-06-05 PROCEDURE — 83690 ASSAY OF LIPASE: CPT | Performed by: EMERGENCY MEDICINE

## 2022-06-05 PROCEDURE — 99285 EMERGENCY DEPT VISIT HI MDM: CPT

## 2022-06-05 PROCEDURE — 85025 COMPLETE CBC W/AUTO DIFF WBC: CPT

## 2022-06-05 PROCEDURE — 36415 COLL VENOUS BLD VENIPUNCTURE: CPT

## 2022-06-05 PROCEDURE — 81025 URINE PREGNANCY TEST: CPT | Performed by: EMERGENCY MEDICINE

## 2022-06-05 PROCEDURE — 84145 PROCALCITONIN (PCT): CPT | Performed by: NURSE PRACTITIONER

## 2022-06-05 PROCEDURE — 80053 COMPREHEN METABOLIC PANEL: CPT | Performed by: EMERGENCY MEDICINE

## 2022-06-05 RX ORDER — SODIUM CHLORIDE 0.9 % (FLUSH) 0.9 %
10 SYRINGE (ML) INJECTION AS NEEDED
Status: DISCONTINUED | OUTPATIENT
Start: 2022-06-05 | End: 2022-06-10 | Stop reason: HOSPADM

## 2022-06-06 ENCOUNTER — APPOINTMENT (OUTPATIENT)
Dept: CT IMAGING | Facility: HOSPITAL | Age: 47
End: 2022-06-06
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
## 2022-06-06 PROBLEM — K85.90 ACUTE PANCREATITIS: Status: ACTIVE | Noted: 2022-06-06

## 2022-06-06 PROBLEM — I10 HYPERTENSION: Status: ACTIVE | Noted: 2022-06-06

## 2022-06-06 PROBLEM — F31.9 BIPOLAR AFFECTIVE: Status: ACTIVE | Noted: 2022-06-06

## 2022-06-06 PROBLEM — G43.909 MIGRAINE: Status: ACTIVE | Noted: 2022-06-06

## 2022-06-06 PROBLEM — M54.2 NECK PAIN, ACUTE: Status: ACTIVE | Noted: 2022-06-06

## 2022-06-06 LAB
ALBUMIN SERPL-MCNC: 3.5 G/DL (ref 3.5–5.2)
ALBUMIN/GLOB SERPL: 1.1 G/DL
ALP SERPL-CCNC: 78 U/L (ref 39–117)
ALT SERPL W P-5'-P-CCNC: 26 U/L (ref 1–33)
ANION GAP SERPL CALCULATED.3IONS-SCNC: 10 MMOL/L (ref 5–15)
AST SERPL-CCNC: 25 U/L (ref 1–32)
BASOPHILS # BLD AUTO: 0.07 10*3/MM3 (ref 0–0.2)
BASOPHILS NFR BLD AUTO: 0.4 % (ref 0–1.5)
BILIRUB SERPL-MCNC: 1 MG/DL (ref 0–1.2)
BUN SERPL-MCNC: 8 MG/DL (ref 6–20)
BUN/CREAT SERPL: 11.8 (ref 7–25)
CALCIUM SPEC-SCNC: 8.5 MG/DL (ref 8.6–10.5)
CHLORIDE SERPL-SCNC: 102 MMOL/L (ref 98–107)
CHOLEST SERPL-MCNC: 202 MG/DL (ref 0–200)
CO2 SERPL-SCNC: 23 MMOL/L (ref 22–29)
CREAT SERPL-MCNC: 0.68 MG/DL (ref 0.57–1)
D-LACTATE SERPL-SCNC: 1.7 MMOL/L (ref 0.5–2)
DEPRECATED RDW RBC AUTO: 40.3 FL (ref 37–54)
EGFRCR SERPLBLD CKD-EPI 2021: 106.9 ML/MIN/1.73
EOSINOPHIL # BLD AUTO: 0.32 10*3/MM3 (ref 0–0.4)
EOSINOPHIL NFR BLD AUTO: 1.7 % (ref 0.3–6.2)
ERYTHROCYTE [DISTWIDTH] IN BLOOD BY AUTOMATED COUNT: 12.3 % (ref 12.3–15.4)
FLUAV RNA RESP QL NAA+PROBE: NOT DETECTED
FLUBV RNA RESP QL NAA+PROBE: NOT DETECTED
GLOBULIN UR ELPH-MCNC: 3.3 GM/DL
GLUCOSE SERPL-MCNC: 142 MG/DL (ref 65–99)
HBA1C MFR BLD: 6 % (ref 4.8–5.6)
HCT VFR BLD AUTO: 43.1 % (ref 34–46.6)
HDLC SERPL-MCNC: 51 MG/DL (ref 40–60)
HGB BLD-MCNC: 14.9 G/DL (ref 12–15.9)
HOLD SPECIMEN: NORMAL
IMM GRANULOCYTES # BLD AUTO: 0.05 10*3/MM3 (ref 0–0.05)
IMM GRANULOCYTES NFR BLD AUTO: 0.3 % (ref 0–0.5)
LDLC SERPL CALC-MCNC: 135 MG/DL (ref 0–100)
LDLC/HDLC SERPL: 2.62 {RATIO}
LIPASE SERPL-CCNC: 225 U/L (ref 13–60)
LYMPHOCYTES # BLD AUTO: 3.13 10*3/MM3 (ref 0.7–3.1)
LYMPHOCYTES NFR BLD AUTO: 17.1 % (ref 19.6–45.3)
MAGNESIUM SERPL-MCNC: 2 MG/DL (ref 1.6–2.6)
MAGNESIUM SERPL-MCNC: 2.2 MG/DL (ref 1.6–2.6)
MCH RBC QN AUTO: 30.8 PG (ref 26.6–33)
MCHC RBC AUTO-ENTMCNC: 34.6 G/DL (ref 31.5–35.7)
MCV RBC AUTO: 89 FL (ref 79–97)
MONOCYTES # BLD AUTO: 2.12 10*3/MM3 (ref 0.1–0.9)
MONOCYTES NFR BLD AUTO: 11.6 % (ref 5–12)
NEUTROPHILS NFR BLD AUTO: 12.64 10*3/MM3 (ref 1.7–7)
NEUTROPHILS NFR BLD AUTO: 68.9 % (ref 42.7–76)
NRBC BLD AUTO-RTO: 0 /100 WBC (ref 0–0.2)
PLATELET # BLD AUTO: 374 10*3/MM3 (ref 140–450)
PMV BLD AUTO: 9.8 FL (ref 6–12)
POTASSIUM SERPL-SCNC: 3.8 MMOL/L (ref 3.5–5.2)
PROCALCITONIN SERPL-MCNC: 0.07 NG/ML (ref 0–0.25)
PROT SERPL-MCNC: 6.8 G/DL (ref 6–8.5)
RBC # BLD AUTO: 4.84 10*6/MM3 (ref 3.77–5.28)
RSV RNA NPH QL NAA+NON-PROBE: NOT DETECTED
SARS-COV-2 RNA RESP QL NAA+PROBE: NOT DETECTED
SODIUM SERPL-SCNC: 135 MMOL/L (ref 136–145)
TRIGL SERPL-MCNC: 130 MG/DL (ref 0–150)
TRIGL SERPL-MCNC: 86 MG/DL (ref 0–150)
TSH SERPL DL<=0.05 MIU/L-ACNC: 2.46 UIU/ML (ref 0.27–4.2)
VLDLC SERPL-MCNC: 16 MG/DL (ref 5–40)
WBC NRBC COR # BLD: 18.33 10*3/MM3 (ref 3.4–10.8)

## 2022-06-06 PROCEDURE — 25010000002 CEFEPIME PER 500 MG: Performed by: NURSE PRACTITIONER

## 2022-06-06 PROCEDURE — 25010000002 HYDROMORPHONE PER 4 MG: Performed by: FAMILY MEDICINE

## 2022-06-06 PROCEDURE — 25010000002 KETOROLAC TROMETHAMINE PER 15 MG: Performed by: FAMILY MEDICINE

## 2022-06-06 PROCEDURE — 80050 GENERAL HEALTH PANEL: CPT | Performed by: FAMILY MEDICINE

## 2022-06-06 PROCEDURE — 71250 CT THORAX DX C-: CPT

## 2022-06-06 PROCEDURE — 99223 1ST HOSP IP/OBS HIGH 75: CPT | Performed by: FAMILY MEDICINE

## 2022-06-06 PROCEDURE — 25010000002 MORPHINE PER 10 MG: Performed by: EMERGENCY MEDICINE

## 2022-06-06 PROCEDURE — 83036 HEMOGLOBIN GLYCOSYLATED A1C: CPT | Performed by: FAMILY MEDICINE

## 2022-06-06 PROCEDURE — 70450 CT HEAD/BRAIN W/O DYE: CPT

## 2022-06-06 PROCEDURE — 25010000002 ONDANSETRON PER 1 MG: Performed by: EMERGENCY MEDICINE

## 2022-06-06 PROCEDURE — 87040 BLOOD CULTURE FOR BACTERIA: CPT | Performed by: NURSE PRACTITIONER

## 2022-06-06 PROCEDURE — 25010000002 IOPAMIDOL 61 % SOLUTION: Performed by: EMERGENCY MEDICINE

## 2022-06-06 PROCEDURE — 83690 ASSAY OF LIPASE: CPT | Performed by: FAMILY MEDICINE

## 2022-06-06 PROCEDURE — 80061 LIPID PANEL: CPT | Performed by: FAMILY MEDICINE

## 2022-06-06 PROCEDURE — 83735 ASSAY OF MAGNESIUM: CPT | Performed by: FAMILY MEDICINE

## 2022-06-06 PROCEDURE — 74177 CT ABD & PELVIS W/CONTRAST: CPT

## 2022-06-06 PROCEDURE — 72125 CT NECK SPINE W/O DYE: CPT

## 2022-06-06 PROCEDURE — 25010000002 ONDANSETRON PER 1 MG: Performed by: FAMILY MEDICINE

## 2022-06-06 PROCEDURE — 87637 SARSCOV2&INF A&B&RSV AMP PRB: CPT | Performed by: NURSE PRACTITIONER

## 2022-06-06 RX ORDER — AMOXICILLIN 250 MG
2 CAPSULE ORAL 2 TIMES DAILY
Status: DISCONTINUED | OUTPATIENT
Start: 2022-06-06 | End: 2022-06-10 | Stop reason: HOSPADM

## 2022-06-06 RX ORDER — ONDANSETRON 2 MG/ML
4 INJECTION INTRAMUSCULAR; INTRAVENOUS ONCE
Status: COMPLETED | OUTPATIENT
Start: 2022-06-06 | End: 2022-06-06

## 2022-06-06 RX ORDER — HYDROMORPHONE HYDROCHLORIDE 1 MG/ML
0.5 INJECTION, SOLUTION INTRAMUSCULAR; INTRAVENOUS; SUBCUTANEOUS
Status: DISPENSED | OUTPATIENT
Start: 2022-06-06 | End: 2022-06-08

## 2022-06-06 RX ORDER — ACETAMINOPHEN 650 MG/1
650 SUPPOSITORY RECTAL EVERY 4 HOURS PRN
Status: DISCONTINUED | OUTPATIENT
Start: 2022-06-06 | End: 2022-06-10 | Stop reason: HOSPADM

## 2022-06-06 RX ORDER — BISACODYL 10 MG
10 SUPPOSITORY, RECTAL RECTAL DAILY PRN
Status: DISCONTINUED | OUTPATIENT
Start: 2022-06-06 | End: 2022-06-10 | Stop reason: HOSPADM

## 2022-06-06 RX ORDER — METOPROLOL SUCCINATE 100 MG/1
100 TABLET, EXTENDED RELEASE ORAL DAILY
Status: DISCONTINUED | OUTPATIENT
Start: 2022-06-06 | End: 2022-06-10 | Stop reason: HOSPADM

## 2022-06-06 RX ORDER — KETOROLAC TROMETHAMINE 15 MG/ML
15 INJECTION, SOLUTION INTRAMUSCULAR; INTRAVENOUS ONCE
Status: COMPLETED | OUTPATIENT
Start: 2022-06-06 | End: 2022-06-06

## 2022-06-06 RX ORDER — LOSARTAN POTASSIUM 50 MG/1
100 TABLET ORAL
Status: DISCONTINUED | OUTPATIENT
Start: 2022-06-06 | End: 2022-06-10 | Stop reason: HOSPADM

## 2022-06-06 RX ORDER — MORPHINE SULFATE 4 MG/ML
4 INJECTION, SOLUTION INTRAMUSCULAR; INTRAVENOUS ONCE
Status: COMPLETED | OUTPATIENT
Start: 2022-06-06 | End: 2022-06-06

## 2022-06-06 RX ORDER — BUTALBITAL, ACETAMINOPHEN AND CAFFEINE 50; 325; 40 MG/1; MG/1; MG/1
2 TABLET ORAL EVERY 4 HOURS PRN
Status: DISCONTINUED | OUTPATIENT
Start: 2022-06-06 | End: 2022-06-10 | Stop reason: HOSPADM

## 2022-06-06 RX ORDER — METRONIDAZOLE 500 MG/100ML
500 INJECTION, SOLUTION INTRAVENOUS EVERY 8 HOURS SCHEDULED
Status: DISCONTINUED | OUTPATIENT
Start: 2022-06-06 | End: 2022-06-08

## 2022-06-06 RX ORDER — BISACODYL 5 MG/1
5 TABLET, DELAYED RELEASE ORAL DAILY PRN
Status: DISCONTINUED | OUTPATIENT
Start: 2022-06-06 | End: 2022-06-10 | Stop reason: HOSPADM

## 2022-06-06 RX ORDER — PANTOPRAZOLE SODIUM 40 MG/10ML
40 INJECTION, POWDER, LYOPHILIZED, FOR SOLUTION INTRAVENOUS
Status: DISCONTINUED | OUTPATIENT
Start: 2022-06-06 | End: 2022-06-10 | Stop reason: HOSPADM

## 2022-06-06 RX ORDER — ONDANSETRON 2 MG/ML
4 INJECTION INTRAMUSCULAR; INTRAVENOUS EVERY 6 HOURS PRN
Status: DISCONTINUED | OUTPATIENT
Start: 2022-06-06 | End: 2022-06-10 | Stop reason: HOSPADM

## 2022-06-06 RX ORDER — SODIUM CHLORIDE, SODIUM LACTATE, POTASSIUM CHLORIDE, CALCIUM CHLORIDE 600; 310; 30; 20 MG/100ML; MG/100ML; MG/100ML; MG/100ML
75 INJECTION, SOLUTION INTRAVENOUS CONTINUOUS
Status: DISCONTINUED | OUTPATIENT
Start: 2022-06-06 | End: 2022-06-10 | Stop reason: HOSPADM

## 2022-06-06 RX ORDER — ACETAMINOPHEN 325 MG/1
650 TABLET ORAL EVERY 4 HOURS PRN
Status: DISCONTINUED | OUTPATIENT
Start: 2022-06-06 | End: 2022-06-10 | Stop reason: HOSPADM

## 2022-06-06 RX ORDER — OMEPRAZOLE 20 MG/1
20 CAPSULE, DELAYED RELEASE ORAL 2 TIMES DAILY
COMMUNITY

## 2022-06-06 RX ORDER — ACETAMINOPHEN 160 MG/5ML
650 SOLUTION ORAL EVERY 4 HOURS PRN
Status: DISCONTINUED | OUTPATIENT
Start: 2022-06-06 | End: 2022-06-10 | Stop reason: HOSPADM

## 2022-06-06 RX ORDER — POLYETHYLENE GLYCOL 3350 17 G/17G
17 POWDER, FOR SOLUTION ORAL DAILY PRN
Status: DISCONTINUED | OUTPATIENT
Start: 2022-06-06 | End: 2022-06-10 | Stop reason: HOSPADM

## 2022-06-06 RX ORDER — SODIUM CHLORIDE 0.9 % (FLUSH) 0.9 %
10 SYRINGE (ML) INJECTION EVERY 12 HOURS SCHEDULED
Status: DISCONTINUED | OUTPATIENT
Start: 2022-06-06 | End: 2022-06-10 | Stop reason: HOSPADM

## 2022-06-06 RX ORDER — ONDANSETRON 4 MG/1
4 TABLET, FILM COATED ORAL EVERY 6 HOURS PRN
Status: DISCONTINUED | OUTPATIENT
Start: 2022-06-06 | End: 2022-06-10 | Stop reason: HOSPADM

## 2022-06-06 RX ORDER — CYCLOBENZAPRINE HCL 10 MG
5 TABLET ORAL 3 TIMES DAILY PRN
Status: DISCONTINUED | OUTPATIENT
Start: 2022-06-06 | End: 2022-06-10 | Stop reason: HOSPADM

## 2022-06-06 RX ORDER — SODIUM CHLORIDE 0.9 % (FLUSH) 0.9 %
10 SYRINGE (ML) INJECTION AS NEEDED
Status: DISCONTINUED | OUTPATIENT
Start: 2022-06-06 | End: 2022-06-10 | Stop reason: HOSPADM

## 2022-06-06 RX ADMIN — ONDANSETRON 4 MG: 2 INJECTION INTRAMUSCULAR; INTRAVENOUS at 16:32

## 2022-06-06 RX ADMIN — SODIUM CHLORIDE 1000 ML: 9 INJECTION, SOLUTION INTRAVENOUS at 03:30

## 2022-06-06 RX ADMIN — IOPAMIDOL 95 ML: 612 INJECTION, SOLUTION INTRAVENOUS at 00:17

## 2022-06-06 RX ADMIN — SODIUM CHLORIDE, POTASSIUM CHLORIDE, SODIUM LACTATE AND CALCIUM CHLORIDE 150 ML/HR: 600; 310; 30; 20 INJECTION, SOLUTION INTRAVENOUS at 04:52

## 2022-06-06 RX ADMIN — SENNOSIDES AND DOCUSATE SODIUM 2 TABLET: 50; 8.6 TABLET ORAL at 08:52

## 2022-06-06 RX ADMIN — UBROGEPANT 100 MG: 100 TABLET ORAL at 21:22

## 2022-06-06 RX ADMIN — LOSARTAN POTASSIUM 100 MG: 50 TABLET, FILM COATED ORAL at 14:39

## 2022-06-06 RX ADMIN — ONDANSETRON 4 MG: 2 INJECTION INTRAMUSCULAR; INTRAVENOUS at 10:18

## 2022-06-06 RX ADMIN — HYDROMORPHONE HYDROCHLORIDE 0.5 MG: 1 INJECTION, SOLUTION INTRAMUSCULAR; INTRAVENOUS; SUBCUTANEOUS at 21:22

## 2022-06-06 RX ADMIN — MORPHINE SULFATE 4 MG: 4 INJECTION, SOLUTION INTRAMUSCULAR; INTRAVENOUS at 00:24

## 2022-06-06 RX ADMIN — CEFEPIME 2 G: 2 INJECTION, POWDER, FOR SOLUTION INTRAVENOUS at 20:12

## 2022-06-06 RX ADMIN — Medication 2 G: at 06:51

## 2022-06-06 RX ADMIN — ONDANSETRON 4 MG: 2 INJECTION INTRAMUSCULAR; INTRAVENOUS at 22:38

## 2022-06-06 RX ADMIN — KETOROLAC TROMETHAMINE 15 MG: 15 INJECTION, SOLUTION INTRAMUSCULAR; INTRAVENOUS at 05:03

## 2022-06-06 RX ADMIN — PANTOPRAZOLE SODIUM 40 MG: 40 INJECTION, POWDER, FOR SOLUTION INTRAVENOUS at 05:03

## 2022-06-06 RX ADMIN — SENNOSIDES AND DOCUSATE SODIUM 2 TABLET: 50; 8.6 TABLET ORAL at 21:22

## 2022-06-06 RX ADMIN — METOPROLOL SUCCINATE 100 MG: 100 TABLET, EXTENDED RELEASE ORAL at 10:18

## 2022-06-06 RX ADMIN — HYDROMORPHONE HYDROCHLORIDE 0.5 MG: 1 INJECTION, SOLUTION INTRAMUSCULAR; INTRAVENOUS; SUBCUTANEOUS at 03:36

## 2022-06-06 RX ADMIN — ONDANSETRON 4 MG: 2 INJECTION INTRAMUSCULAR; INTRAVENOUS at 00:25

## 2022-06-06 RX ADMIN — Medication 10 ML: at 21:22

## 2022-06-06 RX ADMIN — METRONIDAZOLE 500 MG: 500 INJECTION, SOLUTION INTRAVENOUS at 21:28

## 2022-06-06 RX ADMIN — Medication 10 ML: at 08:53

## 2022-06-06 RX ADMIN — METRONIDAZOLE 500 MG: 500 INJECTION, SOLUTION INTRAVENOUS at 06:08

## 2022-06-06 RX ADMIN — METRONIDAZOLE 500 MG: 500 INJECTION, SOLUTION INTRAVENOUS at 14:39

## 2022-06-06 RX ADMIN — HYDROMORPHONE HYDROCHLORIDE 0.5 MG: 1 INJECTION, SOLUTION INTRAMUSCULAR; INTRAVENOUS; SUBCUTANEOUS at 08:52

## 2022-06-06 RX ADMIN — CEFEPIME 2 G: 2 INJECTION, POWDER, FOR SOLUTION INTRAVENOUS at 11:41

## 2022-06-06 RX ADMIN — HYDROMORPHONE HYDROCHLORIDE 0.5 MG: 1 INJECTION, SOLUTION INTRAMUSCULAR; INTRAVENOUS; SUBCUTANEOUS at 16:32

## 2022-06-06 RX ADMIN — HYDROMORPHONE HYDROCHLORIDE 0.5 MG: 1 INJECTION, SOLUTION INTRAMUSCULAR; INTRAVENOUS; SUBCUTANEOUS at 11:43

## 2022-06-06 NOTE — ED PROVIDER NOTES
[place]    EMERGENCY DEPARTMENT ENCOUNTER      Pt Name: Rebekah Ga  MRN: [id number]  YOB: 1975  Date of evaluation: 6/5/2022  Provider: Venkat Cruz MD    CHIEF COMPLAINT       Chief Complaint   Patient presents with   • Abdominal Pain         HISTORY OF PRESENT ILLNESS  (Location/Symptom, Timing/Onset, Context/Setting, Quality, Duration, Modifying Factors, Severity.)   Rebekah Ga is a 47 y.o. female who presents to the emergency department w/ moderate aching epigastric pain x 2 wks radiating to the LUQ w/o some difficulty breathing but no chest pain, vomiting, diarrhea, fever, chills. No obvious inciting or modifying factors.       Nursing notes were reviewed.    REVIEW OF SYSTEMS    (2-9 systems for level 4, 10 or more for level 5)   ROS:  General:  No fevers, no chills, no weakness  Cardiovascular:  No chest pain, no palpitations  Respiratory:  SOB  Gastrointestinal:  Upper abd pain  Musculoskeletal:  No muscle pain, no joint pain  Skin:  No rash  Neurologic:  No speech problems, no headache, no extremity numbness, no extremity tingling, no extremity weakness  Psychiatric:  No anxiety  Genitourinary:  No dysuria, no hematuria    Except as noted above the remainder of the review of systems was reviewed and negative.       PAST MEDICAL HISTORY     Past Medical History:   Diagnosis Date   • ADHD    • Anxiety    • Bipolar affective (HCC)    • Depression    • Hypertension    • Migraine          SURGICAL HISTORY     History reviewed. No pertinent surgical history.      CURRENT MEDICATIONS     No current facility-administered medications for this encounter.    Current Outpatient Medications:   •  amLODIPine (NORVASC) 5 MG tablet, Take 1 tablet by mouth Daily., Disp: 30 tablet, Rfl: 0  •  Cholecalciferol (Vitamin D3) 1.25 MG (19991 UT) capsule, Take 1 capsule by mouth 1 (One) Time Per Week., Disp: 4 capsule, Rfl: 5  •  lisdexamfetamine (Vyvanse) 50 MG capsule, Take 1 capsule by mouth Every  Morning, Disp: 30 capsule, Rfl: 0  •  losartan-hydrochlorothiazide (HYZAAR) 100-25 MG per tablet, Take 1 tablet by mouth Daily., Disp: 30 tablet, Rfl: 6  •  methocarbamol (ROBAXIN) 750 MG tablet, Take one tablet, by mouth, every 8 hours, as needed, for muscle spasm/tightness., Disp: 60 tablet, Rfl: 0  •  metoprolol succinate XL (TOPROL-XL) 100 MG 24 hr tablet, Take 1 tablet by mouth Daily., Disp: 84 tablet, Rfl: 10  •  omeprazole (priLOSEC) 20 MG capsule, Take 20 mg by mouth 2 (Two) Times a Day., Disp: , Rfl:   •  ubrogepant (ubrogepant) 100 MG tablet, Take one as needed for headache, may repeat once in 2 hours, max 200 mg in 24 hours., Disp: 10 tablet, Rfl: 5  •  clonazePAM (KlonoPIN) 0.5 MG tablet, Take 1 tablet by mouth Daily As Needed for panic, Disp: 15 tablet, Rfl: 0  •  Fremanezumab-vfrm (Ajovy) 225 MG/1.5ML solution prefilled syringe, Inject 675 mg under the skin into the appropriate area as directed Every 3 (Three) Months., Disp: 9 mL, Rfl: 3    ALLERGIES     Aspirin and Penicillins    FAMILY HISTORY       Family History   Problem Relation Age of Onset   • Breast cancer Mother 76   • Ovarian cancer Neg Hx           SOCIAL HISTORY       Social History     Socioeconomic History   • Marital status:    Tobacco Use   • Smoking status: Never Smoker   • Smokeless tobacco: Never Used   Substance and Sexual Activity   • Alcohol use: No   • Drug use: No   • Sexual activity: Defer         PHYSICAL EXAM    (up to 7 for level 4, 8 or more for level 5)     Vitals:    06/10/22 0742 06/10/22 0844 06/10/22 0900 06/10/22 1000   BP: 155/97 155/97     BP Location: Right arm Right arm     Patient Position: Lying Lying     Pulse: 65 71 62 67   Resp: 20 20     Temp: 97.7 °F (36.5 °C) 97.7 °F (36.5 °C)     TempSrc: Oral Oral     SpO2: 94% 95% 96% 96%   Weight:       Height:           Physical Exam  General: Awake, alert, no acute distress.  HEENT: Conjunctivae normal.  Neck: Trachea midline.  Cardiac: Heart regular rate,  rhythm, no murmurs, rubs, or gallops  Lungs: Lungs are clear to auscultation, there is no wheezing, rhonchi, or rales. There is no use of accessory muscles.  Chest wall: There is no tenderness to palpation over the chest wall or over ribs  Abdomen: Moderate upper abd tenderness w/o distention/rebound/guarding  Musculoskeletal: No deformity.  Neuro: Alert and oriented x 4.  Dermatology: Skin is warm and dry  Psych: Mentation is grossly normal, cognition is grossly normal. Affect is appropriate.        DIAGNOSTIC RESULTS     EKG: All EKGs are interpreted by the Emergency Department Physician who either signs or Co-signs this chart in the absence of a cardiologist.    ECG 12 Lead   Final Result   Test Reason : Upper Abdominal Pain Triage Protocol   Blood Pressure :   */*   mmHG   Vent. Rate :  98 BPM     Atrial Rate :  98 BPM      P-R Int : 170 ms          QRS Dur :  80 ms       QT Int : 344 ms       P-R-T Axes :  38  45  21 degrees      QTc Int : 439 ms      ** Poor data quality, interpretation may be adversely affected   Normal sinus rhythm   Normal ECG   No previous ECGs available   Confirmed by SYLVIE HANCOCK ([id number]) on 6/9/2022 4:42:15 AM      Referred By: EDMD           Confirmed By: SYLVIE HANCOCK      SCANNED - TELEMETRY     Final Result         SCANNED - TELEMETRY     Final Result         SCANNED - TELEMETRY     Final Result         SCANNED - TELEMETRY     Final Result         SCANNED - TELEMETRY     Final Result             RADIOLOGY:   Non-plain film images such as CT, Ultrasound and MRI are read by the radiologist. Plain radiographic images are visualized and preliminarily interpreted by the emergency physician with the below findings:      [x] Radiologist's Report Reviewed:  US Gallbladder   Final Result       1. No evidence of cholelithiasis   2. Mild common bile duct dilatation. Correlate with any clinical   findings of biliary obstruction   3. Hepatic steatosis with incidental cyst   4. Unremarkable  visualized pancreas and right kidney       This report was finalized on 6/8/2022 9:37 AM by August Douglas.          CT Head Without Contrast   Final Result      Head CT:       1.  No acute intracranial abnormality.         Cervical spine CT:      1.  No acute fracture or malalignment in the cervical spine.            Electronically signed by:  Yunier Marie     6/6/2022 2:54 AM Mountain Time      CT Cervical Spine Without Contrast   Final Result      Head CT:       1.  No acute intracranial abnormality.         Cervical spine CT:      1.  No acute fracture or malalignment in the cervical spine.            Electronically signed by:  Yunier Marie     6/6/2022 2:54 AM Mountain Time      CT Chest Without Contrast Diagnostic   Final Result      1.  2.2 cm left thyroid nodule. Further evaluation can be obtained with outpatient ultrasound.   2.  No acute abnormality in the chest.                  Electronically signed by:  Carolyne Marquis M.D.     6/6/2022 2:49 AM Mountain Time      CT Abdomen Pelvis With Contrast   Final Result         1. Surgical changes of distal pancreatectomy and splenectomy with some inflammatory changes and stranding around the cut margin of the pancreatic body and tail is suspicious for pancreatitis.   2. Diffuse hepatic steatosis.               Electronically signed by:  Edu Hays D.O.     6/5/2022 11:02 PM Mountain Time      XR Chest 1 View   Final Result      1.  No acute cardiopulmonary process.               Electronically signed by:  Carolyne Marquis M.D.     6/5/2022 9:41 PM Mountain Time            ED BEDSIDE ULTRASOUND:   Performed by ED Physician - none    LABS:    I have reviewed and interpreted all of the currently available lab results from this visit (if applicable):  Results for orders placed or performed during the hospital encounter of 06/05/22   COVID-19, FLU A/B, RSV PCR - Swab, Nasopharynx    Specimen: Nasopharynx; Swab   Result Value Ref Range    COVID19 Not Detected Not  Detected - Ref. Range    Influenza A PCR Not Detected Not Detected    Influenza B PCR Not Detected Not Detected    RSV, PCR Not Detected Not Detected   Blood Culture - Blood, Hand, Left    Specimen: Hand, Left; Blood   Result Value Ref Range    Blood Culture No growth at 5 days    Comprehensive Metabolic Panel    Specimen: Blood   Result Value Ref Range    Glucose 121 (H) 65 - 99 mg/dL    BUN 10 6 - 20 mg/dL    Creatinine 0.75 0.57 - 1.00 mg/dL    Sodium 136 136 - 145 mmol/L    Potassium 3.8 3.5 - 5.2 mmol/L    Chloride 100 98 - 107 mmol/L    CO2 26.0 22.0 - 29.0 mmol/L    Calcium 9.1 8.6 - 10.5 mg/dL    Total Protein 7.7 6.0 - 8.5 g/dL    Albumin 4.30 3.50 - 5.20 g/dL    ALT (SGPT) 33 1 - 33 U/L    AST (SGOT) 31 1 - 32 U/L    Alkaline Phosphatase 94 39 - 117 U/L    Total Bilirubin 0.6 0.0 - 1.2 mg/dL    Globulin 3.4 gm/dL    A/G Ratio 1.3 g/dL    BUN/Creatinine Ratio 13.3 7.0 - 25.0    Anion Gap 10.0 5.0 - 15.0 mmol/L    eGFR 97.7 >60.0 mL/min/1.73   Lipase    Specimen: Blood   Result Value Ref Range    Lipase 401 (H) 13 - 60 U/L   Troponin    Specimen: Blood   Result Value Ref Range    Troponin T <0.010 0.000 - 0.030 ng/mL   Urinalysis With Microscopic If Indicated (No Culture) - Urine, Clean Catch    Specimen: Urine, Clean Catch   Result Value Ref Range    Color, UA Yellow Yellow, Straw    Appearance, UA Clear Clear    pH, UA 7.0 5.0 - 8.0    Specific Gravity, UA 1.009 1.001 - 1.030    Glucose, UA Negative Negative    Ketones, UA Negative Negative    Bilirubin, UA Negative Negative    Blood, UA Moderate (2+) (A) Negative    Protein, UA Negative Negative    Leuk Esterase, UA Negative Negative    Nitrite, UA Negative Negative    Urobilinogen, UA 0.2 E.U./dL 0.2 - 1.0 E.U./dL   CBC Auto Differential    Specimen: Blood   Result Value Ref Range    WBC 16.72 (H) 3.40 - 10.80 10*3/mm3    RBC 5.21 3.77 - 5.28 10*6/mm3    Hemoglobin 16.1 (H) 12.0 - 15.9 g/dL    Hematocrit 47.0 (H) 34.0 - 46.6 %    MCV 90.2 79.0 - 97.0 fL     MCH 30.9 26.6 - 33.0 pg    MCHC 34.3 31.5 - 35.7 g/dL    RDW 12.4 12.3 - 15.4 %    RDW-SD 40.7 37.0 - 54.0 fl    MPV 9.7 6.0 - 12.0 fL    Platelets 413 140 - 450 10*3/mm3    Neutrophil % 63.8 42.7 - 76.0 %    Lymphocyte % 22.5 19.6 - 45.3 %    Monocyte % 10.6 5.0 - 12.0 %    Eosinophil % 2.1 0.3 - 6.2 %    Basophil % 0.5 0.0 - 1.5 %    Immature Grans % 0.5 0.0 - 0.5 %    Neutrophils, Absolute 10.65 (H) 1.70 - 7.00 10*3/mm3    Lymphocytes, Absolute 3.77 (H) 0.70 - 3.10 10*3/mm3    Monocytes, Absolute 1.78 (H) 0.10 - 0.90 10*3/mm3    Eosinophils, Absolute 0.35 0.00 - 0.40 10*3/mm3    Basophils, Absolute 0.09 0.00 - 0.20 10*3/mm3    Immature Grans, Absolute 0.08 (H) 0.00 - 0.05 10*3/mm3    nRBC 0.0 0.0 - 0.2 /100 WBC   Urinalysis, Microscopic Only - Urine, Clean Catch    Specimen: Urine, Clean Catch   Result Value Ref Range    RBC, UA 21-30 (A) None Seen, 0-2 /HPF    WBC, UA 0-2 None Seen, 0-2 /HPF    Bacteria, UA None Seen None Seen, Trace /HPF    Squamous Epithelial Cells, UA 0-2 None Seen, 0-2 /HPF    Hyaline Casts, UA None Seen 0 - 6 /LPF    Methodology Automated Microscopy    Procalcitonin    Specimen: Blood   Result Value Ref Range    Procalcitonin 0.07 0.00 - 0.25 ng/mL   Lactic Acid, Plasma    Specimen: Blood   Result Value Ref Range    Lactate 1.7 0.5 - 2.0 mmol/L   Magnesium    Specimen: Blood   Result Value Ref Range    Magnesium 2.2 1.6 - 2.6 mg/dL   CBC Auto Differential    Specimen: Blood   Result Value Ref Range    WBC 18.33 (H) 3.40 - 10.80 10*3/mm3    RBC 4.84 3.77 - 5.28 10*6/mm3    Hemoglobin 14.9 12.0 - 15.9 g/dL    Hematocrit 43.1 34.0 - 46.6 %    MCV 89.0 79.0 - 97.0 fL    MCH 30.8 26.6 - 33.0 pg    MCHC 34.6 31.5 - 35.7 g/dL    RDW 12.3 12.3 - 15.4 %    RDW-SD 40.3 37.0 - 54.0 fl    MPV 9.8 6.0 - 12.0 fL    Platelets 374 140 - 450 10*3/mm3    Neutrophil % 68.9 42.7 - 76.0 %    Lymphocyte % 17.1 (L) 19.6 - 45.3 %    Monocyte % 11.6 5.0 - 12.0 %    Eosinophil % 1.7 0.3 - 6.2 %    Basophil % 0.4  0.0 - 1.5 %    Immature Grans % 0.3 0.0 - 0.5 %    Neutrophils, Absolute 12.64 (H) 1.70 - 7.00 10*3/mm3    Lymphocytes, Absolute 3.13 (H) 0.70 - 3.10 10*3/mm3    Monocytes, Absolute 2.12 (H) 0.10 - 0.90 10*3/mm3    Eosinophils, Absolute 0.32 0.00 - 0.40 10*3/mm3    Basophils, Absolute 0.07 0.00 - 0.20 10*3/mm3    Immature Grans, Absolute 0.05 0.00 - 0.05 10*3/mm3    nRBC 0.0 0.0 - 0.2 /100 WBC   Comprehensive Metabolic Panel    Specimen: Blood   Result Value Ref Range    Glucose 142 (H) 65 - 99 mg/dL    BUN 8 6 - 20 mg/dL    Creatinine 0.68 0.57 - 1.00 mg/dL    Sodium 135 (L) 136 - 145 mmol/L    Potassium 3.8 3.5 - 5.2 mmol/L    Chloride 102 98 - 107 mmol/L    CO2 23.0 22.0 - 29.0 mmol/L    Calcium 8.5 (L) 8.6 - 10.5 mg/dL    Total Protein 6.8 6.0 - 8.5 g/dL    Albumin 3.50 3.50 - 5.20 g/dL    ALT (SGPT) 26 1 - 33 U/L    AST (SGOT) 25 1 - 32 U/L    Alkaline Phosphatase 78 39 - 117 U/L    Total Bilirubin 1.0 0.0 - 1.2 mg/dL    Globulin 3.3 gm/dL    A/G Ratio 1.1 g/dL    BUN/Creatinine Ratio 11.8 7.0 - 25.0    Anion Gap 10.0 5.0 - 15.0 mmol/L    eGFR 106.9 >60.0 mL/min/1.73   Magnesium    Specimen: Blood   Result Value Ref Range    Magnesium 2.0 1.6 - 2.6 mg/dL   TSH    Specimen: Blood   Result Value Ref Range    TSH 2.460 0.270 - 4.200 uIU/mL   Hemoglobin A1c    Specimen: Blood   Result Value Ref Range    Hemoglobin A1C 6.00 (H) 4.80 - 5.60 %   Triglycerides    Specimen: Blood   Result Value Ref Range    Triglycerides 130 0 - 150 mg/dL   Lipase    Specimen: Blood   Result Value Ref Range    Lipase 225 (H) 13 - 60 U/L   Lipid Panel    Specimen: Blood   Result Value Ref Range    Total Cholesterol 202 (H) 0 - 200 mg/dL    Triglycerides 86 0 - 150 mg/dL    HDL Cholesterol 51 40 - 60 mg/dL    LDL Cholesterol  135 (H) 0 - 100 mg/dL    VLDL Cholesterol 16 5 - 40 mg/dL    LDL/HDL Ratio 2.62    Urinalysis With Culture If Indicated - Urine, Clean Catch    Specimen: Urine, Clean Catch   Result Value Ref Range    Color, UA  Yellow Yellow, Straw    Appearance, UA Clear Clear    pH, UA 8.0 5.0 - 8.0    Specific Gravity, UA 1.006 1.001 - 1.030    Glucose, UA Negative Negative    Ketones, UA Negative Negative    Bilirubin, UA Negative Negative    Blood, UA Moderate (2+) (A) Negative    Protein, UA Negative Negative    Leuk Esterase, UA Negative Negative    Nitrite, UA Negative Negative    Urobilinogen, UA 0.2 E.U./dL 0.2 - 1.0 E.U./dL   Urinalysis, Microscopic Only - Urine, Clean Catch    Specimen: Urine, Clean Catch   Result Value Ref Range    RBC, UA 13-20 (A) None Seen, 0-2 /HPF    WBC, UA None Seen None Seen, 0-2 /HPF    Bacteria, UA None Seen None Seen, Trace /HPF    Squamous Epithelial Cells, UA None Seen None Seen, 0-2 /HPF    Hyaline Casts, UA None Seen 0 - 6 /LPF    Methodology Automated Microscopy    Basic Metabolic Panel    Specimen: Blood   Result Value Ref Range    Glucose 113 (H) 65 - 99 mg/dL    BUN 6 6 - 20 mg/dL    Creatinine 0.60 0.57 - 1.00 mg/dL    Sodium 138 136 - 145 mmol/L    Potassium 3.7 3.5 - 5.2 mmol/L    Chloride 106 98 - 107 mmol/L    CO2 24.0 22.0 - 29.0 mmol/L    Calcium 8.3 (L) 8.6 - 10.5 mg/dL    BUN/Creatinine Ratio 10.0 7.0 - 25.0    Anion Gap 8.0 5.0 - 15.0 mmol/L    eGFR 110.2 >60.0 mL/min/1.73   CBC Auto Differential    Specimen: Blood   Result Value Ref Range    WBC 15.51 (H) 3.40 - 10.80 10*3/mm3    RBC 4.29 3.77 - 5.28 10*6/mm3    Hemoglobin 13.6 12.0 - 15.9 g/dL    Hematocrit 40.3 34.0 - 46.6 %    MCV 93.9 79.0 - 97.0 fL    MCH 31.7 26.6 - 33.0 pg    MCHC 33.7 31.5 - 35.7 g/dL    RDW 12.2 (L) 12.3 - 15.4 %    RDW-SD 42.3 37.0 - 54.0 fl    MPV 9.8 6.0 - 12.0 fL    Platelets 365 140 - 450 10*3/mm3    Neutrophil % 66.6 42.7 - 76.0 %    Lymphocyte % 19.7 19.6 - 45.3 %    Monocyte % 10.1 5.0 - 12.0 %    Eosinophil % 2.7 0.3 - 6.2 %    Basophil % 0.5 0.0 - 1.5 %    Immature Grans % 0.4 0.0 - 0.5 %    Neutrophils, Absolute 10.34 (H) 1.70 - 7.00 10*3/mm3    Lymphocytes, Absolute 3.05 0.70 - 3.10 10*3/mm3     Monocytes, Absolute 1.56 (H) 0.10 - 0.90 10*3/mm3    Eosinophils, Absolute 0.42 (H) 0.00 - 0.40 10*3/mm3    Basophils, Absolute 0.08 0.00 - 0.20 10*3/mm3    Immature Grans, Absolute 0.06 (H) 0.00 - 0.05 10*3/mm3    nRBC 0.0 0.0 - 0.2 /100 WBC   Hepatitis Panel, Acute    Specimen: Blood   Result Value Ref Range    Hepatitis B Surface Ag Non-Reactive Non-Reactive    Hep A IgM Non-Reactive Non-Reactive    Hep B C IgM Non-Reactive Non-Reactive    Hepatitis C Ab Non-Reactive Non-Reactive   CBC Auto Differential    Specimen: Blood   Result Value Ref Range    WBC 10.86 (H) 3.40 - 10.80 10*3/mm3    RBC 4.60 3.77 - 5.28 10*6/mm3    Hemoglobin 14.0 12.0 - 15.9 g/dL    Hematocrit 40.0 34.0 - 46.6 %    MCV 87.0 79.0 - 97.0 fL    MCH 30.4 26.6 - 33.0 pg    MCHC 35.0 31.5 - 35.7 g/dL    RDW 12.0 (L) 12.3 - 15.4 %    RDW-SD 37.9 37.0 - 54.0 fl    MPV 9.9 6.0 - 12.0 fL    Platelets 492 (H) 140 - 450 10*3/mm3    Neutrophil % 44.7 42.7 - 76.0 %    Lymphocyte % 38.9 19.6 - 45.3 %    Monocyte % 9.0 5.0 - 12.0 %    Eosinophil % 6.3 (H) 0.3 - 6.2 %    Basophil % 0.9 0.0 - 1.5 %    Immature Grans % 0.2 0.0 - 0.5 %    Neutrophils, Absolute 4.86 1.70 - 7.00 10*3/mm3    Lymphocytes, Absolute 4.22 (H) 0.70 - 3.10 10*3/mm3    Monocytes, Absolute 0.98 (H) 0.10 - 0.90 10*3/mm3    Eosinophils, Absolute 0.68 (H) 0.00 - 0.40 10*3/mm3    Basophils, Absolute 0.10 0.00 - 0.20 10*3/mm3    Immature Grans, Absolute 0.02 0.00 - 0.05 10*3/mm3    nRBC 0.0 0.0 - 0.2 /100 WBC   Scan Slide    Specimen: Blood   Result Value Ref Range    RBC Morphology Normal Normal    Smudge Cells Large/3+ None Seen    Platelet Morphology Normal Normal   POCT Pregnancy, Urine    Specimen: Urine   Result Value Ref Range    HCG, Urine, QL Negative Negative    Lot Number [id number]     Internal Positive Control Positive Positive, Passed    Internal Negative Control Negative Negative, Passed    Expiration Date 07-    ECG 12 Lead   Result Value Ref Range    QT Interval 344  ms    QTC Interval 439 ms   Green Top (Gel)   Result Value Ref Range    Extra Tube Hold for add-ons.    Lavender Top   Result Value Ref Range    Extra Tube hold for add-on    Gold Top - SST   Result Value Ref Range    Extra Tube Hold for add-ons.    Gray Top   Result Value Ref Range    Extra Tube Hold for add-ons.    Light Blue Top   Result Value Ref Range    Extra Tube Hold for add-ons.         All other labs were within normal range or not returned as of this dictation.      EMERGENCY DEPARTMENT COURSE and DIFFERENTIAL DIAGNOSIS/MDM:   Vitals:    Vitals:    06/10/22 0742 06/10/22 0844 06/10/22 0900 06/10/22 1000   BP: 155/97 155/97     BP Location: Right arm Right arm     Patient Position: Lying Lying     Pulse: 65 71 62 67   Resp: 20 20     Temp: 97.7 °F (36.5 °C) 97.7 °F (36.5 °C)     TempSrc: Oral Oral     SpO2: 94% 95% 96% 96%   Weight:       Height:                Pt w/ findings concerning for acute pancreatitis of unknown etiology. No CT evidence of necrosis, hemorrhage. There is no acute biliary pathology. Electrolytes are WNL. Pain controlled w/ IV meds in the ED and pt has been fluid resuscitated. Will be admitted to the hospitalist for further management.       MEDICATIONS ADMINISTERED IN ED:  Medications   HYDROmorphone (DILAUDID) injection 0.5 mg (0.5 mg Intravenous Given 6/7/22 2044)   Morphine sulfate (PF) injection 4 mg (4 mg Intravenous Given 6/6/22 0024)   ondansetron (ZOFRAN) injection 4 mg (4 mg Intravenous Given 6/6/22 0025)   iopamidol (ISOVUE-300) 61 % injection 100 mL (95 mL Intravenous Given 6/6/22 0017)   sodium chloride 0.9 % bolus 1,000 mL (1,000 mL Intravenous New Bag 6/6/22 0330)   cefepime (MAXIPIME) 2 g/100 mL 0.9% NS (mbp) (2 g Intravenous New Bag 6/6/22 0651)   ketorolac (TORADOL) injection 15 mg (15 mg Intravenous Given 6/6/22 0503)         FINAL IMPRESSION      1. Acute pancreatitis, unspecified complication status, unspecified pancreatitis type    2. Acute upper abdominal pain     3. Nausea and vomiting, unspecified vomiting type          DISPOSITION/PLAN     ED Disposition     ED Disposition   Decision to Admit    Condition   --    Comment   Level of Care: Telemetry [5]   Diagnosis: Acute pancreatitis [577.0.ICD-9-CM]   Admitting Physician: DYAN SARKAR [[id number]]   Attending Physician: DYAN SARKAR [[id number]]   Isolate for COVID?: No [0]   Bed Request Comments: tele   Certification: I Certify That Inpatient Hospital Services Are Medically Necessary For Greater Than 2 Midnights                   Venkat Cruz MD  Attending Emergency Physician               Venkat Cruz MD  06/15/22 [id number]
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
## 2022-06-06 NOTE — CASE MANAGEMENT/SOCIAL WORK
Discharge Planning Assessment  [facility]     Patient Name: Rebekah Ga  MRN: [id number]  Today's Date: 6/6/2022    Admit Date: 6/5/2022     Discharge Needs Assessment     Row Name 06/06/22 1121       Living Environment    People in Home spouse;child(jay), dependent    Current Living Arrangements home    Living Arrangement Comments Resides in [place] w/ spouse and dependent children in a 2 level home.       Discharge Needs Assessment    Equipment Currently Used at Home none               Discharge Plan     Row Name 06/06/22 1122       Plan    Plan Home    Patient/Family in Agreement with Plan yes    Plan Comments Spoke w/ pt in room. Insurance confirmed. No POA paperwork. PCP Dr Quinonez or [facility]. Independent. Not current w/HH or DME. No d/c needs @ this time.    Final Discharge Disposition Code 01 - home or self-care              Continued Care and Services - Admitted Since 6/5/2022    Coordination has not been started for this encounter.       Expected Discharge Date and Time     Expected Discharge Date Expected Discharge Time    Jun 8, 2022          Demographic Summary     Row Name 06/06/22 1120       General Information    Preferred Language English               Functional Status     Row Name 06/06/22 1121       Functional Status    Usual Activity Tolerance good       Functional Status, IADL    Medications independent    Meal Preparation independent    Housekeeping independent    Laundry independent    Shopping independent               Psychosocial    No documentation.                Abuse/Neglect    No documentation.                Legal    No documentation.                Substance Abuse    No documentation.                Patient Forms    No documentation.                   Jose Velazquez RN

## 2022-06-07 LAB
BACTERIA UR QL AUTO: ABNORMAL /HPF
BILIRUB UR QL STRIP: NEGATIVE
CLARITY UR: CLEAR
COLOR UR: YELLOW
GLUCOSE UR STRIP-MCNC: NEGATIVE MG/DL
HGB UR QL STRIP.AUTO: ABNORMAL
HYALINE CASTS UR QL AUTO: ABNORMAL /LPF
KETONES UR QL STRIP: NEGATIVE
LEUKOCYTE ESTERASE UR QL STRIP.AUTO: NEGATIVE
NITRITE UR QL STRIP: NEGATIVE
PH UR STRIP.AUTO: 8 [PH] (ref 5–8)
PROT UR QL STRIP: NEGATIVE
RBC # UR STRIP: ABNORMAL /HPF
REF LAB TEST METHOD: ABNORMAL
SP GR UR STRIP: 1.01 (ref 1–1.03)
SQUAMOUS #/AREA URNS HPF: ABNORMAL /HPF
UROBILINOGEN UR QL STRIP: ABNORMAL
WBC # UR STRIP: ABNORMAL /HPF

## 2022-06-07 PROCEDURE — 25010000002 ONDANSETRON PER 1 MG: Performed by: FAMILY MEDICINE

## 2022-06-07 PROCEDURE — 25010000002 CEFEPIME PER 500 MG: Performed by: NURSE PRACTITIONER

## 2022-06-07 PROCEDURE — 99232 SBSQ HOSP IP/OBS MODERATE 35: CPT | Performed by: STUDENT IN AN ORGANIZED HEALTH CARE EDUCATION/TRAINING PROGRAM

## 2022-06-07 PROCEDURE — 25010000002 HYDROMORPHONE PER 4 MG: Performed by: FAMILY MEDICINE

## 2022-06-07 PROCEDURE — 81001 URINALYSIS AUTO W/SCOPE: CPT | Performed by: NURSE PRACTITIONER

## 2022-06-07 RX ORDER — AMLODIPINE BESYLATE 5 MG/1
5 TABLET ORAL
Status: DISCONTINUED | OUTPATIENT
Start: 2022-06-07 | End: 2022-06-10 | Stop reason: HOSPADM

## 2022-06-07 RX ADMIN — HYDROMORPHONE HYDROCHLORIDE 0.5 MG: 1 INJECTION, SOLUTION INTRAMUSCULAR; INTRAVENOUS; SUBCUTANEOUS at 20:44

## 2022-06-07 RX ADMIN — METRONIDAZOLE 500 MG: 500 INJECTION, SOLUTION INTRAVENOUS at 05:01

## 2022-06-07 RX ADMIN — HYDROMORPHONE HYDROCHLORIDE 0.5 MG: 1 INJECTION, SOLUTION INTRAMUSCULAR; INTRAVENOUS; SUBCUTANEOUS at 02:19

## 2022-06-07 RX ADMIN — Medication 10 ML: at 20:44

## 2022-06-07 RX ADMIN — METRONIDAZOLE 500 MG: 500 INJECTION, SOLUTION INTRAVENOUS at 20:46

## 2022-06-07 RX ADMIN — ONDANSETRON 4 MG: 2 INJECTION INTRAMUSCULAR; INTRAVENOUS at 05:01

## 2022-06-07 RX ADMIN — LOSARTAN POTASSIUM 100 MG: 50 TABLET, FILM COATED ORAL at 08:48

## 2022-06-07 RX ADMIN — CEFEPIME 2 G: 2 INJECTION, POWDER, FOR SOLUTION INTRAVENOUS at 20:45

## 2022-06-07 RX ADMIN — METOPROLOL SUCCINATE 100 MG: 100 TABLET, EXTENDED RELEASE ORAL at 08:48

## 2022-06-07 RX ADMIN — ONDANSETRON 4 MG: 2 INJECTION INTRAMUSCULAR; INTRAVENOUS at 16:28

## 2022-06-07 RX ADMIN — HYDROMORPHONE HYDROCHLORIDE 0.5 MG: 1 INJECTION, SOLUTION INTRAMUSCULAR; INTRAVENOUS; SUBCUTANEOUS at 13:07

## 2022-06-07 RX ADMIN — CEFEPIME 2 G: 2 INJECTION, POWDER, FOR SOLUTION INTRAVENOUS at 04:46

## 2022-06-07 RX ADMIN — CEFEPIME 2 G: 2 INJECTION, POWDER, FOR SOLUTION INTRAVENOUS at 13:09

## 2022-06-07 RX ADMIN — METRONIDAZOLE 500 MG: 500 INJECTION, SOLUTION INTRAVENOUS at 16:28

## 2022-06-07 RX ADMIN — PANTOPRAZOLE SODIUM 40 MG: 40 INJECTION, POWDER, FOR SOLUTION INTRAVENOUS at 05:01

## 2022-06-07 RX ADMIN — ACETAMINOPHEN 325MG 650 MG: 325 TABLET ORAL at 20:44

## 2022-06-07 RX ADMIN — BUTALBITAL, ACETAMINOPHEN, AND CAFFEINE 2 TABLET: 50; 325; 40 TABLET ORAL at 05:09

## 2022-06-07 RX ADMIN — SENNOSIDES AND DOCUSATE SODIUM 2 TABLET: 50; 8.6 TABLET ORAL at 08:48

## 2022-06-07 RX ADMIN — Medication 10 ML: at 08:47

## 2022-06-07 RX ADMIN — AMLODIPINE BESYLATE 5 MG: 5 TABLET ORAL at 13:07

## 2022-06-07 RX ADMIN — HYDROMORPHONE HYDROCHLORIDE 0.5 MG: 1 INJECTION, SOLUTION INTRAMUSCULAR; INTRAVENOUS; SUBCUTANEOUS at 05:52

## 2022-06-07 RX ADMIN — BUTALBITAL, ACETAMINOPHEN, AND CAFFEINE 2 TABLET: 50; 325; 40 TABLET ORAL at 20:44
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
## 2022-06-08 ENCOUNTER — APPOINTMENT (OUTPATIENT)
Dept: ULTRASOUND IMAGING | Facility: HOSPITAL | Age: 47
End: 2022-06-08

## 2022-06-08 LAB
ANION GAP SERPL CALCULATED.3IONS-SCNC: 8 MMOL/L (ref 5–15)
BASOPHILS # BLD AUTO: 0.08 10*3/MM3 (ref 0–0.2)
BASOPHILS NFR BLD AUTO: 0.5 % (ref 0–1.5)
BUN SERPL-MCNC: 6 MG/DL (ref 6–20)
BUN/CREAT SERPL: 10 (ref 7–25)
CALCIUM SPEC-SCNC: 8.3 MG/DL (ref 8.6–10.5)
CHLORIDE SERPL-SCNC: 106 MMOL/L (ref 98–107)
CO2 SERPL-SCNC: 24 MMOL/L (ref 22–29)
CREAT SERPL-MCNC: 0.6 MG/DL (ref 0.57–1)
DEPRECATED RDW RBC AUTO: 42.3 FL (ref 37–54)
EGFRCR SERPLBLD CKD-EPI 2021: 110.2 ML/MIN/1.73
EOSINOPHIL # BLD AUTO: 0.42 10*3/MM3 (ref 0–0.4)
EOSINOPHIL NFR BLD AUTO: 2.7 % (ref 0.3–6.2)
ERYTHROCYTE [DISTWIDTH] IN BLOOD BY AUTOMATED COUNT: 12.2 % (ref 12.3–15.4)
GLUCOSE SERPL-MCNC: 113 MG/DL (ref 65–99)
HCT VFR BLD AUTO: 40.3 % (ref 34–46.6)
HGB BLD-MCNC: 13.6 G/DL (ref 12–15.9)
IMM GRANULOCYTES # BLD AUTO: 0.06 10*3/MM3 (ref 0–0.05)
IMM GRANULOCYTES NFR BLD AUTO: 0.4 % (ref 0–0.5)
LYMPHOCYTES # BLD AUTO: 3.05 10*3/MM3 (ref 0.7–3.1)
LYMPHOCYTES NFR BLD AUTO: 19.7 % (ref 19.6–45.3)
MCH RBC QN AUTO: 31.7 PG (ref 26.6–33)
MCHC RBC AUTO-ENTMCNC: 33.7 G/DL (ref 31.5–35.7)
MCV RBC AUTO: 93.9 FL (ref 79–97)
MONOCYTES # BLD AUTO: 1.56 10*3/MM3 (ref 0.1–0.9)
MONOCYTES NFR BLD AUTO: 10.1 % (ref 5–12)
NEUTROPHILS NFR BLD AUTO: 10.34 10*3/MM3 (ref 1.7–7)
NEUTROPHILS NFR BLD AUTO: 66.6 % (ref 42.7–76)
NRBC BLD AUTO-RTO: 0 /100 WBC (ref 0–0.2)
PLATELET # BLD AUTO: 365 10*3/MM3 (ref 140–450)
PMV BLD AUTO: 9.8 FL (ref 6–12)
POTASSIUM SERPL-SCNC: 3.7 MMOL/L (ref 3.5–5.2)
RBC # BLD AUTO: 4.29 10*6/MM3 (ref 3.77–5.28)
SODIUM SERPL-SCNC: 138 MMOL/L (ref 136–145)
WBC NRBC COR # BLD: 15.51 10*3/MM3 (ref 3.4–10.8)

## 2022-06-08 PROCEDURE — 85025 COMPLETE CBC W/AUTO DIFF WBC: CPT | Performed by: STUDENT IN AN ORGANIZED HEALTH CARE EDUCATION/TRAINING PROGRAM

## 2022-06-08 PROCEDURE — 76705 ECHO EXAM OF ABDOMEN: CPT

## 2022-06-08 PROCEDURE — 99222 1ST HOSP IP/OBS MODERATE 55: CPT | Performed by: INTERNAL MEDICINE

## 2022-06-08 PROCEDURE — 80048 BASIC METABOLIC PNL TOTAL CA: CPT | Performed by: STUDENT IN AN ORGANIZED HEALTH CARE EDUCATION/TRAINING PROGRAM

## 2022-06-08 PROCEDURE — 99232 SBSQ HOSP IP/OBS MODERATE 35: CPT | Performed by: STUDENT IN AN ORGANIZED HEALTH CARE EDUCATION/TRAINING PROGRAM

## 2022-06-08 PROCEDURE — 25010000002 HYDROMORPHONE PER 4 MG: Performed by: STUDENT IN AN ORGANIZED HEALTH CARE EDUCATION/TRAINING PROGRAM

## 2022-06-08 PROCEDURE — 25010000002 ONDANSETRON PER 1 MG: Performed by: FAMILY MEDICINE

## 2022-06-08 PROCEDURE — 25010000002 CEFEPIME PER 500 MG: Performed by: NURSE PRACTITIONER

## 2022-06-08 RX ORDER — HYDROMORPHONE HYDROCHLORIDE 1 MG/ML
0.5 INJECTION, SOLUTION INTRAMUSCULAR; INTRAVENOUS; SUBCUTANEOUS EVERY 4 HOURS PRN
Status: DISCONTINUED | OUTPATIENT
Start: 2022-06-08 | End: 2022-06-10 | Stop reason: HOSPADM

## 2022-06-08 RX ORDER — CHOLECALCIFEROL (VITAMIN D3) 125 MCG
5 CAPSULE ORAL NIGHTLY PRN
Status: DISCONTINUED | OUTPATIENT
Start: 2022-06-08 | End: 2022-06-10 | Stop reason: HOSPADM

## 2022-06-08 RX ADMIN — LOSARTAN POTASSIUM 100 MG: 50 TABLET, FILM COATED ORAL at 09:52

## 2022-06-08 RX ADMIN — BUTALBITAL, ACETAMINOPHEN, AND CAFFEINE 2 TABLET: 50; 325; 40 TABLET ORAL at 20:24

## 2022-06-08 RX ADMIN — SODIUM CHLORIDE, POTASSIUM CHLORIDE, SODIUM LACTATE AND CALCIUM CHLORIDE 150 ML/HR: 600; 310; 30; 20 INJECTION, SOLUTION INTRAVENOUS at 13:11

## 2022-06-08 RX ADMIN — METRONIDAZOLE 500 MG: 500 INJECTION, SOLUTION INTRAVENOUS at 05:45

## 2022-06-08 RX ADMIN — ONDANSETRON 4 MG: 2 INJECTION INTRAMUSCULAR; INTRAVENOUS at 03:36

## 2022-06-08 RX ADMIN — SENNOSIDES AND DOCUSATE SODIUM 2 TABLET: 50; 8.6 TABLET ORAL at 09:52

## 2022-06-08 RX ADMIN — AMLODIPINE BESYLATE 5 MG: 5 TABLET ORAL at 09:52

## 2022-06-08 RX ADMIN — CEFEPIME 2 G: 2 INJECTION, POWDER, FOR SOLUTION INTRAVENOUS at 03:29

## 2022-06-08 RX ADMIN — PANTOPRAZOLE SODIUM 40 MG: 40 INJECTION, POWDER, FOR SOLUTION INTRAVENOUS at 05:45

## 2022-06-08 RX ADMIN — Medication 10 ML: at 09:52

## 2022-06-08 RX ADMIN — SENNOSIDES AND DOCUSATE SODIUM 2 TABLET: 50; 8.6 TABLET ORAL at 20:24

## 2022-06-08 RX ADMIN — Medication 5 MG: at 00:27

## 2022-06-08 RX ADMIN — HYDROMORPHONE HYDROCHLORIDE 0.5 MG: 1 INJECTION, SOLUTION INTRAMUSCULAR; INTRAVENOUS; SUBCUTANEOUS at 20:24

## 2022-06-08 RX ADMIN — HYDROMORPHONE HYDROCHLORIDE 0.5 MG: 1 INJECTION, SOLUTION INTRAMUSCULAR; INTRAVENOUS; SUBCUTANEOUS at 13:07

## 2022-06-08 RX ADMIN — METOPROLOL SUCCINATE 100 MG: 100 TABLET, EXTENDED RELEASE ORAL at 09:51

## 2022-06-08 RX ADMIN — Medication 10 ML: at 20:25

## 2022-06-08 NOTE — CASE MANAGEMENT/SOCIAL WORK
Continued Stay Note  [facility]     Patient Name: Rebekah Ga  MRN: [id number]  Today's Date: 6/8/2022    Admit Date: 6/5/2022     Discharge Plan     Row Name 06/08/22 1013       Plan    Plan Home    Patient/Family in Agreement with Plan yes    Plan Comments Spoke w/pt in room, plan is still home @ d/c. No d/c needs verbalized @ this time.    Final Discharge Disposition Code 01 - home or self-care    Row Name 06/08/22 0809       Plan    Final Discharge Disposition Code 01 - home or self-care               Discharge Codes    No documentation.               Expected Discharge Date and Time     Expected Discharge Date Expected Discharge Time    Jun 9, 2022             Joes Velazquez RN
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
## 2022-06-09 LAB
HAV IGM SERPL QL IA: NORMAL
HBV CORE IGM SERPL QL IA: NORMAL
HBV SURFACE AG SERPL QL IA: NORMAL
HCV AB SER DONR QL: NORMAL
QT INTERVAL: 344 MS
QTC INTERVAL: 439 MS

## 2022-06-09 PROCEDURE — 80074 ACUTE HEPATITIS PANEL: CPT | Performed by: INTERNAL MEDICINE

## 2022-06-09 PROCEDURE — 25010000002 HYDROMORPHONE PER 4 MG: Performed by: STUDENT IN AN ORGANIZED HEALTH CARE EDUCATION/TRAINING PROGRAM

## 2022-06-09 PROCEDURE — 99232 SBSQ HOSP IP/OBS MODERATE 35: CPT | Performed by: NURSE PRACTITIONER

## 2022-06-09 RX ORDER — HYDROCODONE BITARTRATE AND ACETAMINOPHEN 5; 325 MG/1; MG/1
1 TABLET ORAL EVERY 6 HOURS PRN
Status: DISCONTINUED | OUTPATIENT
Start: 2022-06-09 | End: 2022-06-10 | Stop reason: HOSPADM

## 2022-06-09 RX ADMIN — HYDROCODONE BITARTRATE AND ACETAMINOPHEN 1 TABLET: 5; 325 TABLET ORAL at 15:15

## 2022-06-09 RX ADMIN — SODIUM CHLORIDE, POTASSIUM CHLORIDE, SODIUM LACTATE AND CALCIUM CHLORIDE 75 ML/HR: 600; 310; 30; 20 INJECTION, SOLUTION INTRAVENOUS at 09:28

## 2022-06-09 RX ADMIN — AMLODIPINE BESYLATE 5 MG: 5 TABLET ORAL at 08:40

## 2022-06-09 RX ADMIN — SENNOSIDES AND DOCUSATE SODIUM 2 TABLET: 50; 8.6 TABLET ORAL at 08:40

## 2022-06-09 RX ADMIN — LOSARTAN POTASSIUM 100 MG: 50 TABLET, FILM COATED ORAL at 08:41

## 2022-06-09 RX ADMIN — METOPROLOL SUCCINATE 100 MG: 100 TABLET, EXTENDED RELEASE ORAL at 08:41

## 2022-06-09 RX ADMIN — PANTOPRAZOLE SODIUM 40 MG: 40 INJECTION, POWDER, FOR SOLUTION INTRAVENOUS at 06:19

## 2022-06-09 RX ADMIN — Medication 10 ML: at 20:06

## 2022-06-09 RX ADMIN — HYDROCODONE BITARTRATE AND ACETAMINOPHEN 1 TABLET: 5; 325 TABLET ORAL at 22:28

## 2022-06-09 RX ADMIN — HYDROMORPHONE HYDROCHLORIDE 0.5 MG: 1 INJECTION, SOLUTION INTRAMUSCULAR; INTRAVENOUS; SUBCUTANEOUS at 06:19

## 2022-06-09 RX ADMIN — SODIUM CHLORIDE, POTASSIUM CHLORIDE, SODIUM LACTATE AND CALCIUM CHLORIDE 75 ML/HR: 600; 310; 30; 20 INJECTION, SOLUTION INTRAVENOUS at 20:06

## 2022-06-09 RX ADMIN — Medication 10 ML: at 08:41

## 2022-06-09 RX ADMIN — SENNOSIDES AND DOCUSATE SODIUM 2 TABLET: 50; 8.6 TABLET ORAL at 20:06
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
## 2022-06-09 NOTE — PROGRESS NOTES
Patient tolerating diet.  Now on oral meds  Can probably Discharge debby  FU GI PRN  Diet instructions given for fatty liver  GI will sign off

## 2022-06-10 VITALS
OXYGEN SATURATION: 96 % | RESPIRATION RATE: 20 BRPM | BODY MASS INDEX: 34.04 KG/M2 | SYSTOLIC BLOOD PRESSURE: 155 MMHG | HEIGHT: 62 IN | HEART RATE: 67 BPM | WEIGHT: 185 LBS | DIASTOLIC BLOOD PRESSURE: 97 MMHG | TEMPERATURE: 97.7 F

## 2022-06-10 LAB
BASOPHILS # BLD AUTO: 0.1 10*3/MM3 (ref 0–0.2)
BASOPHILS NFR BLD AUTO: 0.9 % (ref 0–1.5)
DEPRECATED RDW RBC AUTO: 37.9 FL (ref 37–54)
EOSINOPHIL # BLD AUTO: 0.68 10*3/MM3 (ref 0–0.4)
EOSINOPHIL NFR BLD AUTO: 6.3 % (ref 0.3–6.2)
ERYTHROCYTE [DISTWIDTH] IN BLOOD BY AUTOMATED COUNT: 12 % (ref 12.3–15.4)
HCT VFR BLD AUTO: 40 % (ref 34–46.6)
HGB BLD-MCNC: 14 G/DL (ref 12–15.9)
IMM GRANULOCYTES # BLD AUTO: 0.02 10*3/MM3 (ref 0–0.05)
IMM GRANULOCYTES NFR BLD AUTO: 0.2 % (ref 0–0.5)
LYMPHOCYTES # BLD AUTO: 4.22 10*3/MM3 (ref 0.7–3.1)
LYMPHOCYTES NFR BLD AUTO: 38.9 % (ref 19.6–45.3)
MCH RBC QN AUTO: 30.4 PG (ref 26.6–33)
MCHC RBC AUTO-ENTMCNC: 35 G/DL (ref 31.5–35.7)
MCV RBC AUTO: 87 FL (ref 79–97)
MONOCYTES # BLD AUTO: 0.98 10*3/MM3 (ref 0.1–0.9)
MONOCYTES NFR BLD AUTO: 9 % (ref 5–12)
NEUTROPHILS NFR BLD AUTO: 4.86 10*3/MM3 (ref 1.7–7)
NEUTROPHILS NFR BLD AUTO: 44.7 % (ref 42.7–76)
NRBC BLD AUTO-RTO: 0 /100 WBC (ref 0–0.2)
PLAT MORPH BLD: NORMAL
PLATELET # BLD AUTO: 492 10*3/MM3 (ref 140–450)
PMV BLD AUTO: 9.9 FL (ref 6–12)
RBC # BLD AUTO: 4.6 10*6/MM3 (ref 3.77–5.28)
RBC MORPH BLD: NORMAL
SMUDGE CELLS BLD QL SMEAR: NORMAL
WBC NRBC COR # BLD: 10.86 10*3/MM3 (ref 3.4–10.8)

## 2022-06-10 PROCEDURE — 85025 COMPLETE CBC W/AUTO DIFF WBC: CPT | Performed by: NURSE PRACTITIONER

## 2022-06-10 PROCEDURE — 99239 HOSP IP/OBS DSCHRG MGMT >30: CPT | Performed by: NURSE PRACTITIONER

## 2022-06-10 PROCEDURE — 85007 BL SMEAR W/DIFF WBC COUNT: CPT | Performed by: NURSE PRACTITIONER

## 2022-06-10 RX ORDER — AMLODIPINE BESYLATE 5 MG/1
5 TABLET ORAL
Qty: 30 TABLET | Refills: 0 | Status: SHIPPED | OUTPATIENT
Start: 2022-06-11 | End: 2022-11-09

## 2022-06-10 RX ADMIN — PANTOPRAZOLE SODIUM 40 MG: 40 INJECTION, POWDER, FOR SOLUTION INTRAVENOUS at 06:11

## 2022-06-10 RX ADMIN — Medication 10 ML: at 08:45

## 2022-06-10 RX ADMIN — METOPROLOL SUCCINATE 100 MG: 100 TABLET, EXTENDED RELEASE ORAL at 08:44

## 2022-06-10 RX ADMIN — SENNOSIDES AND DOCUSATE SODIUM 2 TABLET: 50; 8.6 TABLET ORAL at 08:44

## 2022-06-10 RX ADMIN — AMLODIPINE BESYLATE 5 MG: 5 TABLET ORAL at 08:44

## 2022-06-10 RX ADMIN — HYDROCODONE BITARTRATE AND ACETAMINOPHEN 1 TABLET: 5; 325 TABLET ORAL at 08:50

## 2022-06-10 RX ADMIN — LOSARTAN POTASSIUM 100 MG: 50 TABLET, FILM COATED ORAL at 08:44
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
## 2022-06-10 NOTE — CASE MANAGEMENT/SOCIAL WORK
Continued Stay Note  [facility]     Patient Name: Rebekah Ga  MRN: [id number]  Today's Date: 6/10/2022    Admit Date: 6/5/2022     Discharge Plan     Row Name 06/10/22 0858       Plan    Plan Home    Patient/Family in Agreement with Plan yes    Plan Comments Spoke w/pt in room. Plan is home @ d/c. No d/c needs verbalized @ this time.    Final Discharge Disposition Code 01 - home or self-care               Discharge Codes    No documentation.               Expected Discharge Date and Time     Expected Discharge Date Expected Discharge Time    Jun 11, 2022             Jose Vealzquez RN

## 2022-06-11 LAB — BACTERIA SPEC AEROBE CULT: NORMAL

## 2022-08-19 ENCOUNTER — HOSPITAL ENCOUNTER (INPATIENT)
Facility: HOSPITAL | Age: 47
LOS: 3 days | Discharge: HOME | End: 2022-08-22
Attending: STUDENT IN AN ORGANIZED HEALTH CARE EDUCATION/TRAINING PROGRAM | Admitting: INTERNAL MEDICINE

## 2022-08-19 ENCOUNTER — APPOINTMENT (OUTPATIENT)
Dept: GENERAL RADIOLOGY | Facility: HOSPITAL | Age: 47
End: 2022-08-19

## 2022-08-19 DIAGNOSIS — I25.9 CHEST PAIN DUE TO MYOCARDIAL ISCHEMIA, UNSPECIFIED ISCHEMIC CHEST PAIN TYPE: ICD-10-CM

## 2022-08-19 DIAGNOSIS — I10 HYPERTENSION, UNSPECIFIED TYPE: ICD-10-CM

## 2022-08-19 DIAGNOSIS — I21.3 ST ELEVATION MYOCARDIAL INFARCTION (STEMI), UNSPECIFIED ARTERY: Primary | ICD-10-CM

## 2022-08-19 LAB
ALBUMIN SERPL-MCNC: 4.1 G/DL (ref 3.5–5.2)
ALBUMIN/GLOB SERPL: 1.3 G/DL
ALP SERPL-CCNC: 103 U/L (ref 39–117)
ALT SERPL W P-5'-P-CCNC: 27 U/L (ref 1–33)
ANION GAP SERPL CALCULATED.3IONS-SCNC: 13 MMOL/L (ref 5–15)
APTT PPP: 29.5 SECONDS (ref 60–90)
AST SERPL-CCNC: 21 U/L (ref 1–32)
BASOPHILS # BLD AUTO: 0.04 10*3/MM3 (ref 0–0.2)
BASOPHILS # BLD MANUAL: 0 10*3/MM3 (ref 0–0.2)
BASOPHILS NFR BLD AUTO: 0.4 % (ref 0–1.5)
BASOPHILS NFR BLD MANUAL: 0 % (ref 0–1.5)
BILIRUB SERPL-MCNC: 0.6 MG/DL (ref 0–1.2)
BUN SERPL-MCNC: 16 MG/DL (ref 6–20)
BUN/CREAT SERPL: 17 (ref 7–25)
CALCIUM SPEC-SCNC: 9.5 MG/DL (ref 8.6–10.5)
CHLORIDE SERPL-SCNC: 106 MMOL/L (ref 98–107)
CO2 SERPL-SCNC: 23 MMOL/L (ref 22–29)
CREAT SERPL-MCNC: 0.94 MG/DL (ref 0.57–1)
D DIMER PPP FEU-MCNC: 0.29 MCGFEU/ML (ref 0.01–0.5)
DEPRECATED RDW RBC AUTO: 43.6 FL (ref 37–54)
DEPRECATED RDW RBC AUTO: 44.5 FL (ref 37–54)
EGFRCR SERPLBLD CKD-EPI 2021: 74.5 ML/MIN/1.73
EOSINOPHIL # BLD AUTO: 0.14 10*3/MM3 (ref 0–0.4)
EOSINOPHIL # BLD MANUAL: 0.12 10*3/MM3 (ref 0–0.4)
EOSINOPHIL NFR BLD AUTO: 1.2 % (ref 0.3–6.2)
EOSINOPHIL NFR BLD MANUAL: 1 % (ref 0.3–6.2)
ERYTHROCYTE [DISTWIDTH] IN BLOOD BY AUTOMATED COUNT: 13.4 % (ref 12.3–15.4)
ERYTHROCYTE [DISTWIDTH] IN BLOOD BY AUTOMATED COUNT: 13.5 % (ref 12.3–15.4)
GLOBULIN UR ELPH-MCNC: 3.2 GM/DL
GLUCOSE BLDC GLUCOMTR-MCNC: 163 MG/DL (ref 70–130)
GLUCOSE SERPL-MCNC: 163 MG/DL (ref 65–99)
HCT VFR BLD AUTO: 46.1 % (ref 34–46.6)
HCT VFR BLD AUTO: 49.2 % (ref 34–46.6)
HGB BLD-MCNC: 15.5 G/DL (ref 12–15.9)
HGB BLD-MCNC: 16.5 G/DL (ref 12–15.9)
HOLD SPECIMEN: NORMAL
HOLD SPECIMEN: NORMAL
IMM GRANULOCYTES # BLD AUTO: 0.03 10*3/MM3 (ref 0–0.05)
IMM GRANULOCYTES NFR BLD AUTO: 0.3 % (ref 0–0.5)
INR PPP: 0.83 (ref 0.84–1.13)
LIPASE SERPL-CCNC: 40 U/L (ref 13–60)
LYMPHOCYTES # BLD AUTO: 3.33 10*3/MM3 (ref 0.7–3.1)
LYMPHOCYTES # BLD MANUAL: 6.48 10*3/MM3 (ref 0.7–3.1)
LYMPHOCYTES NFR BLD AUTO: 29.4 % (ref 19.6–45.3)
LYMPHOCYTES NFR BLD MANUAL: 3 % (ref 5–12)
MAGNESIUM SERPL-MCNC: 2.2 MG/DL (ref 1.6–2.6)
MCH RBC QN AUTO: 29.9 PG (ref 26.6–33)
MCH RBC QN AUTO: 30.2 PG (ref 26.6–33)
MCHC RBC AUTO-ENTMCNC: 33.5 G/DL (ref 31.5–35.7)
MCHC RBC AUTO-ENTMCNC: 33.6 G/DL (ref 31.5–35.7)
MCV RBC AUTO: 89 FL (ref 79–97)
MCV RBC AUTO: 89.9 FL (ref 79–97)
MONOCYTES # BLD AUTO: 0.69 10*3/MM3 (ref 0.1–0.9)
MONOCYTES # BLD: 0.37 10*3/MM3 (ref 0.1–0.9)
MONOCYTES NFR BLD AUTO: 6.1 % (ref 5–12)
NEUTROPHILS # BLD AUTO: 5.26 10*3/MM3 (ref 1.7–7)
NEUTROPHILS NFR BLD AUTO: 62.6 % (ref 42.7–76)
NEUTROPHILS NFR BLD AUTO: 7.11 10*3/MM3 (ref 1.7–7)
NEUTROPHILS NFR BLD MANUAL: 43 % (ref 42.7–76)
NRBC BLD AUTO-RTO: 0 /100 WBC (ref 0–0.2)
NT-PROBNP SERPL-MCNC: 141.7 PG/ML (ref 0–450)
PLAT MORPH BLD: NORMAL
PLATELET # BLD AUTO: 383 10*3/MM3 (ref 140–450)
PLATELET # BLD AUTO: 398 10*3/MM3 (ref 140–450)
PMV BLD AUTO: 9.5 FL (ref 6–12)
PMV BLD AUTO: 9.6 FL (ref 6–12)
POTASSIUM SERPL-SCNC: 3.9 MMOL/L (ref 3.5–5.2)
PROT SERPL-MCNC: 7.3 G/DL (ref 6–8.5)
PROTHROMBIN TIME: 11.3 SECONDS (ref 11.4–14.4)
RBC # BLD AUTO: 5.18 10*6/MM3 (ref 3.77–5.28)
RBC # BLD AUTO: 5.47 10*6/MM3 (ref 3.77–5.28)
RBC MORPH BLD: NORMAL
SARS-COV-2 RDRP RESP QL NAA+PROBE: NORMAL
SODIUM SERPL-SCNC: 142 MMOL/L (ref 136–145)
TROPONIN T SERPL-MCNC: <0.01 NG/ML (ref 0–0.03)
UFH PPP CHRO-ACNC: 0.1 IU/ML (ref 0.3–0.7)
VARIANT LYMPHS NFR BLD MANUAL: 3 % (ref 0–5)
VARIANT LYMPHS NFR BLD MANUAL: 50 % (ref 19.6–45.3)
WBC MORPH BLD: NORMAL
WBC NRBC COR # BLD: 11.34 10*3/MM3 (ref 3.4–10.8)
WBC NRBC COR # BLD: 12.23 10*3/MM3 (ref 3.4–10.8)
WHOLE BLOOD HOLD COAG: NORMAL
WHOLE BLOOD HOLD SPECIMEN: NORMAL

## 2022-08-19 PROCEDURE — 99285 EMERGENCY DEPT VISIT HI MDM: CPT

## 2022-08-19 PROCEDURE — C1894 INTRO/SHEATH, NON-LASER: HCPCS | Performed by: INTERNAL MEDICINE

## 2022-08-19 PROCEDURE — 25010000002 HEPARIN (PORCINE) PER 1000 UNITS: Performed by: INTERNAL MEDICINE

## 2022-08-19 PROCEDURE — C1887 CATHETER, GUIDING: HCPCS | Performed by: INTERNAL MEDICINE

## 2022-08-19 PROCEDURE — 85007 BL SMEAR W/DIFF WBC COUNT: CPT | Performed by: STUDENT IN AN ORGANIZED HEALTH CARE EDUCATION/TRAINING PROGRAM

## 2022-08-19 PROCEDURE — 80053 COMPREHEN METABOLIC PANEL: CPT | Performed by: INTERNAL MEDICINE

## 2022-08-19 PROCEDURE — C1769 GUIDE WIRE: HCPCS | Performed by: INTERNAL MEDICINE

## 2022-08-19 PROCEDURE — C9606 PERC D-E COR REVASC W AMI S: HCPCS | Performed by: INTERNAL MEDICINE

## 2022-08-19 PROCEDURE — 85520 HEPARIN ASSAY: CPT | Performed by: STUDENT IN AN ORGANIZED HEALTH CARE EDUCATION/TRAINING PROGRAM

## 2022-08-19 PROCEDURE — 85379 FIBRIN DEGRADATION QUANT: CPT | Performed by: STUDENT IN AN ORGANIZED HEALTH CARE EDUCATION/TRAINING PROGRAM

## 2022-08-19 PROCEDURE — 82962 GLUCOSE BLOOD TEST: CPT

## 2022-08-19 PROCEDURE — 93005 ELECTROCARDIOGRAM TRACING: CPT | Performed by: INTERNAL MEDICINE

## 2022-08-19 PROCEDURE — 84484 ASSAY OF TROPONIN QUANT: CPT | Performed by: STUDENT IN AN ORGANIZED HEALTH CARE EDUCATION/TRAINING PROGRAM

## 2022-08-19 PROCEDURE — 85730 THROMBOPLASTIN TIME PARTIAL: CPT | Performed by: STUDENT IN AN ORGANIZED HEALTH CARE EDUCATION/TRAINING PROGRAM

## 2022-08-19 PROCEDURE — 92941 PRQ TRLML REVSC TOT OCCL AMI: CPT | Performed by: INTERNAL MEDICINE

## 2022-08-19 PROCEDURE — 93005 ELECTROCARDIOGRAM TRACING: CPT | Performed by: STUDENT IN AN ORGANIZED HEALTH CARE EDUCATION/TRAINING PROGRAM

## 2022-08-19 PROCEDURE — B2111ZZ FLUOROSCOPY OF MULTIPLE CORONARY ARTERIES USING LOW OSMOLAR CONTRAST: ICD-10-PCS | Performed by: INTERNAL MEDICINE

## 2022-08-19 PROCEDURE — 83735 ASSAY OF MAGNESIUM: CPT | Performed by: STUDENT IN AN ORGANIZED HEALTH CARE EDUCATION/TRAINING PROGRAM

## 2022-08-19 PROCEDURE — 80053 COMPREHEN METABOLIC PANEL: CPT | Performed by: STUDENT IN AN ORGANIZED HEALTH CARE EDUCATION/TRAINING PROGRAM

## 2022-08-19 PROCEDURE — 25010000002 MIDAZOLAM PER 1 MG: Performed by: INTERNAL MEDICINE

## 2022-08-19 PROCEDURE — 80061 LIPID PANEL: CPT | Performed by: INTERNAL MEDICINE

## 2022-08-19 PROCEDURE — 83880 ASSAY OF NATRIURETIC PEPTIDE: CPT | Performed by: INTERNAL MEDICINE

## 2022-08-19 PROCEDURE — 93005 ELECTROCARDIOGRAM TRACING: CPT

## 2022-08-19 PROCEDURE — 0 IOPAMIDOL PER 1 ML: Performed by: INTERNAL MEDICINE

## 2022-08-19 PROCEDURE — 99223 1ST HOSP IP/OBS HIGH 75: CPT | Performed by: INTERNAL MEDICINE

## 2022-08-19 PROCEDURE — 25010000002 FENTANYL CITRATE (PF) 50 MCG/ML SOLUTION: Performed by: INTERNAL MEDICINE

## 2022-08-19 PROCEDURE — 85025 COMPLETE CBC W/AUTO DIFF WBC: CPT | Performed by: STUDENT IN AN ORGANIZED HEALTH CARE EDUCATION/TRAINING PROGRAM

## 2022-08-19 PROCEDURE — 87635 SARS-COV-2 COVID-19 AMP PRB: CPT | Performed by: STUDENT IN AN ORGANIZED HEALTH CARE EDUCATION/TRAINING PROGRAM

## 2022-08-19 PROCEDURE — 93458 L HRT ARTERY/VENTRICLE ANGIO: CPT | Performed by: INTERNAL MEDICINE

## 2022-08-19 PROCEDURE — 85347 COAGULATION TIME ACTIVATED: CPT

## 2022-08-19 PROCEDURE — C1874 STENT, COATED/COV W/DEL SYS: HCPCS | Performed by: INTERNAL MEDICINE

## 2022-08-19 PROCEDURE — 83880 ASSAY OF NATRIURETIC PEPTIDE: CPT | Performed by: STUDENT IN AN ORGANIZED HEALTH CARE EDUCATION/TRAINING PROGRAM

## 2022-08-19 PROCEDURE — 4A023N7 MEASUREMENT OF CARDIAC SAMPLING AND PRESSURE, LEFT HEART, PERCUTANEOUS APPROACH: ICD-10-PCS | Performed by: INTERNAL MEDICINE

## 2022-08-19 PROCEDURE — C1725 CATH, TRANSLUMIN NON-LASER: HCPCS | Performed by: INTERNAL MEDICINE

## 2022-08-19 PROCEDURE — 83690 ASSAY OF LIPASE: CPT | Performed by: STUDENT IN AN ORGANIZED HEALTH CARE EDUCATION/TRAINING PROGRAM

## 2022-08-19 PROCEDURE — 85610 PROTHROMBIN TIME: CPT | Performed by: STUDENT IN AN ORGANIZED HEALTH CARE EDUCATION/TRAINING PROGRAM

## 2022-08-19 PROCEDURE — 71045 X-RAY EXAM CHEST 1 VIEW: CPT

## 2022-08-19 PROCEDURE — 82565 ASSAY OF CREATININE: CPT

## 2022-08-19 PROCEDURE — 25010000002 HEPARIN (PORCINE) PER 1000 UNITS: Performed by: STUDENT IN AN ORGANIZED HEALTH CARE EDUCATION/TRAINING PROGRAM

## 2022-08-19 PROCEDURE — 027034Z DILATION OF CORONARY ARTERY, ONE ARTERY WITH DRUG-ELUTING INTRALUMINAL DEVICE, PERCUTANEOUS APPROACH: ICD-10-PCS | Performed by: INTERNAL MEDICINE

## 2022-08-19 DEVICE — XIENCE SKYPOINT™ EVEROLIMUS ELUTING CORONARY STENT SYSTEM 3.00 MM X 23 MM / RAPID-EXCHANGE
Type: IMPLANTABLE DEVICE | Status: FUNCTIONAL
Brand: XIENCE SKYPOINT™

## 2022-08-19 RX ORDER — PANTOPRAZOLE SODIUM 40 MG/1
40 TABLET, DELAYED RELEASE ORAL DAILY
Status: DISCONTINUED | OUTPATIENT
Start: 2022-08-20 | End: 2022-08-22 | Stop reason: HOSPADM

## 2022-08-19 RX ORDER — MIDAZOLAM HYDROCHLORIDE 1 MG/ML
INJECTION INTRAMUSCULAR; INTRAVENOUS AS NEEDED
Status: DISCONTINUED | OUTPATIENT
Start: 2022-08-19 | End: 2022-08-19 | Stop reason: HOSPADM

## 2022-08-19 RX ORDER — ACETAMINOPHEN 325 MG/1
650 TABLET ORAL EVERY 4 HOURS PRN
Status: DISCONTINUED | OUTPATIENT
Start: 2022-08-19 | End: 2022-08-22 | Stop reason: HOSPADM

## 2022-08-19 RX ORDER — NALOXONE HCL 0.4 MG/ML
0.4 VIAL (ML) INJECTION
Status: DISCONTINUED | OUTPATIENT
Start: 2022-08-19 | End: 2022-08-22 | Stop reason: HOSPADM

## 2022-08-19 RX ORDER — MORPHINE SULFATE 2 MG/ML
1 INJECTION, SOLUTION INTRAMUSCULAR; INTRAVENOUS EVERY 4 HOURS PRN
Status: DISCONTINUED | OUTPATIENT
Start: 2022-08-19 | End: 2022-08-22 | Stop reason: HOSPADM

## 2022-08-19 RX ORDER — HEPARIN SODIUM 10000 [USP'U]/100ML
12 INJECTION, SOLUTION INTRAVENOUS
Status: DISCONTINUED | OUTPATIENT
Start: 2022-08-19 | End: 2022-08-19

## 2022-08-19 RX ORDER — HEPARIN SODIUM 1000 [USP'U]/ML
INJECTION, SOLUTION INTRAVENOUS; SUBCUTANEOUS AS NEEDED
Status: DISCONTINUED | OUTPATIENT
Start: 2022-08-19 | End: 2022-08-19 | Stop reason: HOSPADM

## 2022-08-19 RX ORDER — HEPARIN SODIUM 1000 [USP'U]/ML
4000 INJECTION, SOLUTION INTRAVENOUS; SUBCUTANEOUS ONCE
Status: COMPLETED | OUTPATIENT
Start: 2022-08-19 | End: 2022-08-19

## 2022-08-19 RX ORDER — AMLODIPINE BESYLATE 5 MG/1
5 TABLET ORAL
Status: DISCONTINUED | OUTPATIENT
Start: 2022-08-20 | End: 2022-08-22 | Stop reason: HOSPADM

## 2022-08-19 RX ORDER — METHOCARBAMOL 750 MG/1
750 TABLET, FILM COATED ORAL 3 TIMES DAILY PRN
Status: DISCONTINUED | OUTPATIENT
Start: 2022-08-20 | End: 2022-08-22 | Stop reason: HOSPADM

## 2022-08-19 RX ORDER — HEPARIN SODIUM 1000 [USP'U]/ML
25 INJECTION, SOLUTION INTRAVENOUS; SUBCUTANEOUS AS NEEDED
Status: DISCONTINUED | OUTPATIENT
Start: 2022-08-19 | End: 2022-08-19

## 2022-08-19 RX ORDER — ALPRAZOLAM 0.25 MG/1
0.25 TABLET ORAL 3 TIMES DAILY PRN
Status: DISCONTINUED | OUTPATIENT
Start: 2022-08-19 | End: 2022-08-19

## 2022-08-19 RX ORDER — HEPARIN SODIUM 1000 [USP'U]/ML
50 INJECTION, SOLUTION INTRAVENOUS; SUBCUTANEOUS AS NEEDED
Status: DISCONTINUED | OUTPATIENT
Start: 2022-08-19 | End: 2022-08-19

## 2022-08-19 RX ORDER — LIDOCAINE HYDROCHLORIDE 10 MG/ML
INJECTION, SOLUTION EPIDURAL; INFILTRATION; INTRACAUDAL; PERINEURAL AS NEEDED
Status: DISCONTINUED | OUTPATIENT
Start: 2022-08-19 | End: 2022-08-19 | Stop reason: HOSPADM

## 2022-08-19 RX ORDER — ATROPINE SULFATE 1 MG/ML
0.5 INJECTION, SOLUTION INTRAMUSCULAR; INTRAVENOUS; SUBCUTANEOUS
Status: DISCONTINUED | OUTPATIENT
Start: 2022-08-19 | End: 2022-08-22 | Stop reason: HOSPADM

## 2022-08-19 RX ORDER — METOPROLOL SUCCINATE 50 MG/1
100 TABLET, EXTENDED RELEASE ORAL DAILY
Status: DISCONTINUED | OUTPATIENT
Start: 2022-08-20 | End: 2022-08-22 | Stop reason: HOSPADM

## 2022-08-19 RX ORDER — SODIUM CHLORIDE 0.9 % (FLUSH) 0.9 %
10 SYRINGE (ML) INJECTION AS NEEDED
Status: DISCONTINUED | OUTPATIENT
Start: 2022-08-19 | End: 2022-08-22 | Stop reason: HOSPADM

## 2022-08-19 RX ORDER — HYDROCODONE BITARTRATE AND ACETAMINOPHEN 7.5; 325 MG/1; MG/1
1 TABLET ORAL EVERY 4 HOURS PRN
Status: DISCONTINUED | OUTPATIENT
Start: 2022-08-19 | End: 2022-08-22 | Stop reason: HOSPADM

## 2022-08-19 RX ORDER — CLONAZEPAM 0.5 MG/1
0.5 TABLET ORAL 3 TIMES DAILY PRN
Status: DISCONTINUED | OUTPATIENT
Start: 2022-08-19 | End: 2022-08-22 | Stop reason: HOSPADM

## 2022-08-19 RX ORDER — SODIUM CHLORIDE 9 MG/ML
250 INJECTION, SOLUTION INTRAVENOUS ONCE AS NEEDED
Status: DISCONTINUED | OUTPATIENT
Start: 2022-08-19 | End: 2022-08-21

## 2022-08-19 RX ORDER — FENTANYL CITRATE 50 UG/ML
INJECTION, SOLUTION INTRAMUSCULAR; INTRAVENOUS AS NEEDED
Status: DISCONTINUED | OUTPATIENT
Start: 2022-08-19 | End: 2022-08-19 | Stop reason: HOSPADM

## 2022-08-19 RX ORDER — SODIUM CHLORIDE 9 MG/ML
50 INJECTION, SOLUTION INTRAVENOUS CONTINUOUS
Status: ACTIVE | OUTPATIENT
Start: 2022-08-19 | End: 2022-08-20

## 2022-08-19 RX ORDER — NITROGLYCERIN 20 MG/100ML
5-200 INJECTION INTRAVENOUS
Status: DISCONTINUED | OUTPATIENT
Start: 2022-08-19 | End: 2022-08-22 | Stop reason: HOSPADM

## 2022-08-19 RX ORDER — FENTANYL CITRATE 50 UG/ML
25 INJECTION, SOLUTION INTRAMUSCULAR; INTRAVENOUS ONCE
Status: DISCONTINUED | OUTPATIENT
Start: 2022-08-19 | End: 2022-08-20 | Stop reason: SDUPTHER

## 2022-08-19 RX ADMIN — NICARDIPINE HYDROCHLORIDE 2.5 MG/HR: 0.1 INJECTION, SOLUTION INTRAVENOUS at 23:27

## 2022-08-19 RX ADMIN — HEPARIN SODIUM 4000 UNITS: 1000 INJECTION, SOLUTION INTRAVENOUS; SUBCUTANEOUS at 21:27

## 2022-08-19 RX ADMIN — NITROGLYCERIN 5 MCG/MIN: 20 INJECTION INTRAVENOUS at 21:30

## 2022-08-19 RX ADMIN — ALPRAZOLAM 0.25 MG: 0.25 TABLET ORAL at 23:29

## 2022-08-19 RX ADMIN — ACETAMINOPHEN 650 MG: 325 TABLET, FILM COATED ORAL at 23:29

## 2022-08-19 RX ADMIN — SODIUM CHLORIDE 50 ML/HR: 9 INJECTION, SOLUTION INTRAVENOUS at 23:26

## 2022-08-20 ENCOUNTER — APPOINTMENT (OUTPATIENT)
Dept: CARDIOLOGY | Facility: HOSPITAL | Age: 47
End: 2022-08-20

## 2022-08-20 LAB
ACT BLD: 132 SECONDS (ref 82–152)
ALBUMIN SERPL-MCNC: 4 G/DL (ref 3.5–5.2)
ALBUMIN/GLOB SERPL: 1.3 G/DL
ALP SERPL-CCNC: 96 U/L (ref 39–117)
ALT SERPL W P-5'-P-CCNC: 27 U/L (ref 1–33)
ANION GAP SERPL CALCULATED.3IONS-SCNC: 10 MMOL/L (ref 5–15)
ANION GAP SERPL CALCULATED.3IONS-SCNC: 12 MMOL/L (ref 5–15)
ASCENDING AORTA: 2.9 CM
AST SERPL-CCNC: 36 U/L (ref 1–32)
BASOPHILS # BLD AUTO: 0.03 10*3/MM3 (ref 0–0.2)
BASOPHILS NFR BLD AUTO: 0.2 % (ref 0–1.5)
BH CV ECHO MEAS - AO MAX PG: 5.7 MMHG
BH CV ECHO MEAS - AO MEAN PG: 3 MMHG
BH CV ECHO MEAS - AO ROOT DIAM: 3.1 CM
BH CV ECHO MEAS - AO V2 MAX: 119 CM/SEC
BH CV ECHO MEAS - AO V2 VTI: 23.5 CM
BH CV ECHO MEAS - AVA(I,D): 2.7 CM2
BH CV ECHO MEAS - EDV(CUBED): 59.3 ML
BH CV ECHO MEAS - EDV(MOD-SP2): 132 ML
BH CV ECHO MEAS - EDV(MOD-SP4): 111 ML
BH CV ECHO MEAS - EF(MOD-BP): 54.5 %
BH CV ECHO MEAS - EF(MOD-SP2): 43.2 %
BH CV ECHO MEAS - EF(MOD-SP4): 65.6 %
BH CV ECHO MEAS - ESV(CUBED): 15.6 ML
BH CV ECHO MEAS - ESV(MOD-SP2): 75 ML
BH CV ECHO MEAS - ESV(MOD-SP4): 38.2 ML
BH CV ECHO MEAS - FS: 35.9 %
BH CV ECHO MEAS - IVS/LVPW: 1 CM
BH CV ECHO MEAS - IVSD: 1 CM
BH CV ECHO MEAS - LA DIMENSION: 3.3 CM
BH CV ECHO MEAS - LAT PEAK E' VEL: 8.6 CM/SEC
BH CV ECHO MEAS - LV DIASTOLIC VOL/BSA (35-75): 61.3 CM2
BH CV ECHO MEAS - LV MASS(C)D: 122.1 GRAMS
BH CV ECHO MEAS - LV MAX PG: 3.9 MMHG
BH CV ECHO MEAS - LV MEAN PG: 2 MMHG
BH CV ECHO MEAS - LV SYSTOLIC VOL/BSA (12-30): 21.1 CM2
BH CV ECHO MEAS - LV V1 MAX: 98.5 CM/SEC
BH CV ECHO MEAS - LV V1 VTI: 20 CM
BH CV ECHO MEAS - LVIDD: 3.9 CM
BH CV ECHO MEAS - LVIDS: 2.5 CM
BH CV ECHO MEAS - LVOT AREA: 3.1 CM2
BH CV ECHO MEAS - LVOT DIAM: 2 CM
BH CV ECHO MEAS - LVPWD: 1 CM
BH CV ECHO MEAS - MED PEAK E' VEL: 5.6 CM/SEC
BH CV ECHO MEAS - MV A MAX VEL: 88 CM/SEC
BH CV ECHO MEAS - MV DEC SLOPE: 360 CM/SEC2
BH CV ECHO MEAS - MV DEC TIME: 0.19 MSEC
BH CV ECHO MEAS - MV E MAX VEL: 67.6 CM/SEC
BH CV ECHO MEAS - MV E/A: 0.77
BH CV ECHO MEAS - MV MAX PG: 3.2 MMHG
BH CV ECHO MEAS - MV MEAN PG: 1 MMHG
BH CV ECHO MEAS - MV P1/2T: 62.6 MSEC
BH CV ECHO MEAS - MV V2 VTI: 25.2 CM
BH CV ECHO MEAS - MVA(P1/2T): 3.5 CM2
BH CV ECHO MEAS - MVA(VTI): 2.49 CM2
BH CV ECHO MEAS - PA ACC TIME: 0.14 SEC
BH CV ECHO MEAS - PA PR(ACCEL): 15.5 MMHG
BH CV ECHO MEAS - PA V2 MAX: 79.9 CM/SEC
BH CV ECHO MEAS - RAP SYSTOLE: 3 MMHG
BH CV ECHO MEAS - RVSP: 26 MMHG
BH CV ECHO MEAS - SI(MOD-SP2): 31.5 ML/M2
BH CV ECHO MEAS - SI(MOD-SP4): 40.2 ML/M2
BH CV ECHO MEAS - SV(LVOT): 62.8 ML
BH CV ECHO MEAS - SV(MOD-SP2): 57 ML
BH CV ECHO MEAS - SV(MOD-SP4): 72.8 ML
BH CV ECHO MEAS - TAPSE (>1.6): 1.67 CM
BH CV ECHO MEAS - TR MAX PG: 14.8 MMHG
BH CV ECHO MEAS - TR MAX VEL: 188.7 CM/SEC
BH CV ECHO MEASUREMENTS AVERAGE E/E' RATIO: 9.52
BH CV VAS BP LEFT ARM: NORMAL MMHG
BH CV XLRA - RV BASE: 3.6 CM
BH CV XLRA - RV LENGTH: 6.2 CM
BH CV XLRA - RV MID: 2.9 CM
BH CV XLRA - TDI S': 13.6 CM/SEC
BILIRUB SERPL-MCNC: 0.9 MG/DL (ref 0–1.2)
BUN SERPL-MCNC: 13 MG/DL (ref 6–20)
BUN SERPL-MCNC: 14 MG/DL (ref 6–20)
BUN/CREAT SERPL: 19.7 (ref 7–25)
BUN/CREAT SERPL: 20 (ref 7–25)
CALCIUM SPEC-SCNC: 8.6 MG/DL (ref 8.6–10.5)
CALCIUM SPEC-SCNC: 8.7 MG/DL (ref 8.6–10.5)
CHLORIDE SERPL-SCNC: 106 MMOL/L (ref 98–107)
CHLORIDE SERPL-SCNC: 107 MMOL/L (ref 98–107)
CHOLEST SERPL-MCNC: 300 MG/DL (ref 0–200)
CO2 SERPL-SCNC: 21 MMOL/L (ref 22–29)
CO2 SERPL-SCNC: 22 MMOL/L (ref 22–29)
CREAT SERPL-MCNC: 0.65 MG/DL (ref 0.57–1)
CREAT SERPL-MCNC: 0.71 MG/DL (ref 0.57–1)
DEPRECATED RDW RBC AUTO: 43.8 FL (ref 37–54)
EGFRCR SERPLBLD CKD-EPI 2021: 104.4 ML/MIN/1.73
EGFRCR SERPLBLD CKD-EPI 2021: 108.1 ML/MIN/1.73
EOSINOPHIL # BLD AUTO: 0.03 10*3/MM3 (ref 0–0.4)
EOSINOPHIL NFR BLD AUTO: 0.2 % (ref 0.3–6.2)
ERYTHROCYTE [DISTWIDTH] IN BLOOD BY AUTOMATED COUNT: 13.5 % (ref 12.3–15.4)
GLOBULIN UR ELPH-MCNC: 3.1 GM/DL
GLUCOSE SERPL-MCNC: 160 MG/DL (ref 65–99)
GLUCOSE SERPL-MCNC: 169 MG/DL (ref 65–99)
HCT VFR BLD AUTO: 41.7 % (ref 34–46.6)
HDLC SERPL-MCNC: 81 MG/DL (ref 40–60)
HGB BLD-MCNC: 14.4 G/DL (ref 12–15.9)
HOLD SPECIMEN: NORMAL
IMM GRANULOCYTES # BLD AUTO: 0.05 10*3/MM3 (ref 0–0.05)
IMM GRANULOCYTES NFR BLD AUTO: 0.4 % (ref 0–0.5)
LDLC SERPL CALC-MCNC: 211 MG/DL (ref 0–100)
LDLC/HDLC SERPL: 2.56 {RATIO}
LEFT ATRIUM VOLUME INDEX: 23.3 ML/M2
LV EF 2D ECHO EST: 50 %
LYMPHOCYTES # BLD AUTO: 2.88 10*3/MM3 (ref 0.7–3.1)
LYMPHOCYTES NFR BLD AUTO: 20.6 % (ref 19.6–45.3)
MAXIMAL PREDICTED HEART RATE: 171 BPM
MCH RBC QN AUTO: 30.6 PG (ref 26.6–33)
MCHC RBC AUTO-ENTMCNC: 34.5 G/DL (ref 31.5–35.7)
MCV RBC AUTO: 88.5 FL (ref 79–97)
MONOCYTES # BLD AUTO: 1.02 10*3/MM3 (ref 0.1–0.9)
MONOCYTES NFR BLD AUTO: 7.3 % (ref 5–12)
NEUTROPHILS NFR BLD AUTO: 71.3 % (ref 42.7–76)
NEUTROPHILS NFR BLD AUTO: 9.98 10*3/MM3 (ref 1.7–7)
NRBC BLD AUTO-RTO: 0 /100 WBC (ref 0–0.2)
NT-PROBNP SERPL-MCNC: 189.7 PG/ML (ref 0–450)
PLATELET # BLD AUTO: 338 10*3/MM3 (ref 140–450)
PMV BLD AUTO: 9.6 FL (ref 6–12)
POTASSIUM SERPL-SCNC: 3.4 MMOL/L (ref 3.5–5.2)
POTASSIUM SERPL-SCNC: 3.7 MMOL/L (ref 3.5–5.2)
PROT SERPL-MCNC: 7.1 G/DL (ref 6–8.5)
QT INTERVAL: 386 MS
QTC INTERVAL: 434 MS
RBC # BLD AUTO: 4.71 10*6/MM3 (ref 3.77–5.28)
SODIUM SERPL-SCNC: 139 MMOL/L (ref 136–145)
SODIUM SERPL-SCNC: 139 MMOL/L (ref 136–145)
STRESS TARGET HR: 145 BPM
TRIGL SERPL-MCNC: 57 MG/DL (ref 0–150)
TROPONIN T SERPL-MCNC: 1.05 NG/ML (ref 0–0.03)
VLDLC SERPL-MCNC: 8 MG/DL (ref 5–40)
WBC NRBC COR # BLD: 13.99 10*3/MM3 (ref 3.4–10.8)

## 2022-08-20 PROCEDURE — 99232 SBSQ HOSP IP/OBS MODERATE 35: CPT | Performed by: INTERNAL MEDICINE

## 2022-08-20 PROCEDURE — 25010000002 MORPHINE PER 10 MG: Performed by: INTERNAL MEDICINE

## 2022-08-20 PROCEDURE — 85025 COMPLETE CBC W/AUTO DIFF WBC: CPT | Performed by: STUDENT IN AN ORGANIZED HEALTH CARE EDUCATION/TRAINING PROGRAM

## 2022-08-20 PROCEDURE — 93306 TTE W/DOPPLER COMPLETE: CPT

## 2022-08-20 PROCEDURE — 80048 BASIC METABOLIC PNL TOTAL CA: CPT | Performed by: INTERNAL MEDICINE

## 2022-08-20 PROCEDURE — 25010000002 SULFUR HEXAFLUORIDE MICROSPH 60.7-25 MG RECONSTITUTED SUSPENSION: Performed by: INTERNAL MEDICINE

## 2022-08-20 PROCEDURE — 93005 ELECTROCARDIOGRAM TRACING: CPT | Performed by: INTERNAL MEDICINE

## 2022-08-20 PROCEDURE — 93306 TTE W/DOPPLER COMPLETE: CPT | Performed by: INTERNAL MEDICINE

## 2022-08-20 PROCEDURE — 85347 COAGULATION TIME ACTIVATED: CPT

## 2022-08-20 RX ORDER — POTASSIUM CHLORIDE 750 MG/1
40 CAPSULE, EXTENDED RELEASE ORAL ONCE
Status: COMPLETED | OUTPATIENT
Start: 2022-08-20 | End: 2022-08-20

## 2022-08-20 RX ORDER — ATORVASTATIN CALCIUM 40 MG/1
40 TABLET, FILM COATED ORAL NIGHTLY
Status: DISCONTINUED | OUTPATIENT
Start: 2022-08-20 | End: 2022-08-21

## 2022-08-20 RX ORDER — ENOXAPARIN SODIUM 100 MG/ML
40 INJECTION SUBCUTANEOUS DAILY
Status: DISCONTINUED | OUTPATIENT
Start: 2022-08-21 | End: 2022-08-22 | Stop reason: HOSPADM

## 2022-08-20 RX ADMIN — HYDROCHLOROTHIAZIDE: 25 TABLET ORAL at 08:01

## 2022-08-20 RX ADMIN — HYDROCODONE BITARTRATE AND ACETAMINOPHEN 1 TABLET: 7.5; 325 TABLET ORAL at 16:04

## 2022-08-20 RX ADMIN — TICAGRELOR 90 MG: 90 TABLET ORAL at 08:02

## 2022-08-20 RX ADMIN — SULFUR HEXAFLUORIDE 2 ML: KIT at 11:36

## 2022-08-20 RX ADMIN — CLONAZEPAM 0.5 MG: 0.5 TABLET ORAL at 20:18

## 2022-08-20 RX ADMIN — HYDROCODONE BITARTRATE AND ACETAMINOPHEN 1 TABLET: 7.5; 325 TABLET ORAL at 09:37

## 2022-08-20 RX ADMIN — MORPHINE SULFATE 1 MG: 2 INJECTION, SOLUTION INTRAMUSCULAR; INTRAVENOUS at 15:20

## 2022-08-20 RX ADMIN — PANTOPRAZOLE SODIUM 40 MG: 40 TABLET, DELAYED RELEASE ORAL at 08:02

## 2022-08-20 RX ADMIN — POTASSIUM CHLORIDE 40 MEQ: 750 CAPSULE, EXTENDED RELEASE ORAL at 05:11

## 2022-08-20 RX ADMIN — ATORVASTATIN CALCIUM 40 MG: 40 TABLET, FILM COATED ORAL at 20:16

## 2022-08-20 RX ADMIN — METOPROLOL SUCCINATE 100 MG: 100 TABLET, FILM COATED, EXTENDED RELEASE ORAL at 08:01

## 2022-08-20 RX ADMIN — TICAGRELOR 90 MG: 90 TABLET ORAL at 20:16

## 2022-08-20 RX ADMIN — AMLODIPINE BESYLATE 5 MG: 5 TABLET ORAL at 08:01

## 2022-08-20 RX ADMIN — HYDROCODONE BITARTRATE AND ACETAMINOPHEN 1 TABLET: 7.5; 325 TABLET ORAL at 01:48

## 2022-08-20 NOTE — PROGRESS NOTES
"[facility] Cardiology Daily Note       LOS: 1 day   Patient Care Team:  Provider, No Known as PCP - General    Chief Complaint: Acute anterior MI    Subjective     Subjective: 96% on room air.  Blood pressures and heart rates have been good overnight.  No chest pain.  Still a little bit uncomfortable to breathe.    Review of Systems:   As above.    Medications:  amLODIPine, 5 mg, Oral, Q24H  fentaNYL citrate (PF), 25 mcg, Intravenous, Once  losartan-HCTZ (HYZAAR) 100-25 combo dose, , Oral, Daily  metoprolol succinate XL, 100 mg, Oral, Daily  pantoprazole, 40 mg, Oral, Daily  pharmacy consult - MTM, , Does not apply, Daily  ticagrelor, 90 mg, Oral, BID        Objective     Vital Sign Min/Max for last 24 hours  Temp  Min: 97.8 °F (36.6 °C)  Max: 98.5 °F (36.9 °C)   BP  Min: 90/52  Max: 192/119   Pulse  Min: 64  Max: 98   Resp  Min: 14  Max: 18   SpO2  Min: 90 %  Max: 99 %   Flow (L/min)  Min: 2  Max: 2   Weight  Min: 79.8 kg (176 lb)  Max: 79.8 kg (176 lb)      Intake/Output Summary (Last 24 hours) at 8/20/2022 0819  Last data filed at 8/20/2022 0600  Gross per 24 hour   Intake 425 ml   Output --   Net 425 ml        Flowsheet Rows    Flowsheet Row First Filed Value   Admission Height 157.5 cm (62\") Documented at 08/19/2022 2124   Admission Weight 79.8 kg (176 lb) Documented at 08/19/2022 2124          Physical Exam:    General: Alert and oriented.   Cardiovascular: Heart has a nondisplaced focal PMI. Regular rate and rhythm without murmur, gallop or rub.  Lungs: Clear without rales or wheezes. Equal expansion is noted.   Abdomen: Soft, nontender.  Extremities: Show no edema. No hematoma or bruit in the right groin  Skin: warm and dry.     Results Review:    I reviewed the patient's new clinical results.  EKG:  Tele: Sinus rhythm    Labs:    Results from last 7 days   Lab Units 08/20/22  0323 08/19/22  2319 08/19/22  2116   SODIUM mmol/L 139 139 142   POTASSIUM mmol/L 3.4* 3.7 3.9   CHLORIDE mmol/L " 107 106 106   CO2 mmol/L 22.0 21.0* 23.0   BUN mg/dL 13 14 16   CREATININE mg/dL 0.65 0.71 0.94   CALCIUM mg/dL 8.7 8.6 9.5   BILIRUBIN mg/dL  --  0.9 0.6   ALK PHOS U/L  --  96 103   ALT (SGPT) U/L  --  27 27   AST (SGOT) U/L  --  36* 21   GLUCOSE mg/dL 169* 160* 163*     Results from last 7 days   Lab Units 08/20/22  0323 08/19/22  2319 08/19/22  2116   WBC 10*3/mm3 13.99* 11.34* 12.23*   HEMOGLOBIN g/dL 14.4 15.5 16.5*   HEMATOCRIT % 41.7 46.1 49.2*   PLATELETS 10*3/mm3 338 383 398   MONOCYTES % %  --   --  3.0*     Lab Results   Component Value Date    TROPONINT 1.050 (C) 08/19/2022    TROPONINT <0.010 08/19/2022    TROPONINT <0.010 06/05/2022     Lab Results   Component Value Date    CHOL 300 (H) 08/19/2022    CHOL 202 (H) 06/06/2022     Lab Results   Component Value Date    TRIG 57 08/19/2022    TRIG 86 06/06/2022    TRIG 130 06/05/2022     Lab Results   Component Value Date    HDL 81 (H) 08/19/2022    HDL 51 06/06/2022     No components found for: LDLCALC  Lab Results   Component Value Date    INR 0.83 (L) 08/19/2022    PROTIME 11.3 (L) 08/19/2022         Ejection Fraction:    Assessment   Assessment:  1. Coronary artery disease status post stenting to the proximal LAD using 3.0 x 23 mm Xience YOBANY for an acute anterior MI  2. Hypertension  3. Hyperlipidemia  4. Asthma  5. Anxiety/depression/panic attacks      Plan:    Continue Brilinta.  No aspirin.  Patient has anaphylaxis with aspirin.  Begin Lipitor 40 mg daily  Continue metoprolol and losartan for hypertension   To telemetry  May shower daily      Jeane Goncalves MD  08/20/22  08:19 EDT

## 2022-08-20 NOTE — PLAN OF CARE
Goal Outcome Evaluation:    Arrived to unit approx 2300. VSS on RA. NSR per tele. No c/o chest pain. No EKG changes noted. Femoral sheath removed. No gtts. Potassium replaced. Progressing per POC.

## 2022-08-20 NOTE — CONSULTS
INTENSIVIST / PULMONARY INITIAL VISIT (CONSULT / H&P) NOTE     Hospital:  LOS: 0 days   Ms. Rebekah Ga, 47 y.o. 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 Found to have anterolateral / inferior STEMI.  Underwent LHC and had stent to proximal LAD by Dr. Goncalves.  Non-critical disease in LCx and RCA.    Currently complains of headache.  On NTG and Cardene gtts.    Past Medical History:   Diagnosis Date   • ADHD    • Anxiety    • Bipolar affective (HCC)    • Depression    • Hypertension    • Migraine      No past surgical history on file.  Family History   Problem Relation Age of Onset   • Breast cancer Mother 76   • Ovarian cancer Neg Hx      Social History     Socioeconomic History   • Marital status:    Tobacco Use   • Smoking status: Never Smoker   • Smokeless tobacco: Never Used   Substance and Sexual Activity   • Alcohol use: No   • Drug use: No   • Sexual activity: Defer     Allergies   Allergen Reactions   • Aspirin Anaphylaxis     Patient tolerates ibuprofen   • Penicillins Hives and Itching     No current facility-administered medications on file prior to encounter.     Current Outpatient Medications on File Prior to Encounter   Medication Sig Dispense Refill   • amLODIPine (NORVASC) 5 MG tablet Take 1 tablet by mouth Daily. 30 tablet 0   • Cholecalciferol (Vitamin D3) 1.25 MG (53022 UT) capsule Take 1 capsule by mouth 1 (One) Time Per Week. 4 capsule 5   • clonazePAM (KlonoPIN) 0.5 MG tablet Take 1 tablet by  mouth Daily As Needed for panic 15 tablet 0   • Fremanezumab-vfrm (Ajovy) 225 MG/1.5ML solution prefilled syringe Inject 675 mg under the skin into the appropriate area as directed Every 3 (Three) Months. 9 mL 3   • lisdexamfetamine (Vyvanse) 50 MG capsule Take 1 capsule by mouth Every Morning 30 capsule 0   • losartan-hydrochlorothiazide (HYZAAR) 100-25 MG per tablet Take 1 tablet by mouth Daily. 30 tablet 6   • methocarbamol (ROBAXIN) 750 MG tablet Take one tablet, by mouth, every 8 hours, as needed, for muscle spasm/tightness. 60 tablet 0   • metoprolol succinate XL (TOPROL-XL) 100 MG 24 hr tablet Take 1 tablet by mouth Daily. 84 tablet 10   • omeprazole (priLOSEC) 20 MG capsule Take 20 mg by mouth 2 (Two) Times a Day.     • ubrogepant (ubrogepant) 100 MG tablet Take one as needed for headache, may repeat once in 2 hours, max 200 mg in 24 hours. 10 tablet 5       ROS:    Per HPI, all other systems were reviewed and were negative          OBJECTIVE     Vital Sign Min/Max for last 24 hours  Temp  Min: 98.5 °F (36.9 °C)  Max: 98.5 °F (36.9 °C)   BP  Min: 147/105  Max: 192/119   Pulse  Min: 67  Max: 90   Resp  Min: 16  Max: 16   SpO2  Min: 90 %  Max: 99 %   No data recorded     Telemetry:              General Appearance:  No acute distress  Eyes:  No scleral icterus or pallor, pupils normal  Ears, Nose, Mouth, Throat:  Atraumatic, oropharynx clear  Neck:  Trachea midline, thyroid normal  Respiratory:  Clear to auscultation bilaterally, normal effort  Cardiovascular:  Regular rate and rhythm, no murmurs, no peripheral edema  Gastrointestinal:  Soft, non-tender, non-distended, no hepatosplenomegaly  Skin:  Normal temperature, no rash  Psychiatric:  No agitation  Neuro:  No new focal neurologic deficits observed    SpO2: 90 % SpO2  Min: 90 %  Max: 99 %   Device:      Flow Rate:   No data recorded     Mechanical Ventilator Settings:                                         Intake/Ouptut 24 hrs (7:00AM - 6:59 AM)  Intake &  Output (last 3 days)     None            Lines, Drains & Airways     Active LDAs     Name Placement date Placement time Site Days    Peripheral IV 08/19/22 2125 Posterior;Right Hand 08/19/22 2125  Hand  less than 1    Peripheral IV 08/19/22 2125 Left Antecubital 08/19/22 2125  Antecubital  less than 1    Arterial Sheath 6 Fr. Right Femoral 08/19/22 2146  Femoral  less than 1                Hematology:  Results from last 7 days   Lab Units 08/19/22 2116   WBC 10*3/mm3 12.23*   HEMOGLOBIN g/dL 16.5*   HEMATOCRIT % 49.2*   PLATELETS 10*3/mm3 398   MONOCYTES % % 3.0*     Electrolytes, Magnesium and Phosphorus:  Results from last 7 days   Lab Units 08/19/22 2116   SODIUM mmol/L 142   POTASSIUM mmol/L 3.9   CO2 mmol/L 23.0   MAGNESIUM mg/dL 2.2     Renal:  Results from last 7 days   Lab Units 08/19/22 2116   CREATININE mg/dL 0.94   BUN mg/dL 16     Estimated Creatinine Clearance: 70.9 mL/min (by C-G formula based on SCr of 0.94 mg/dL).  Estimated Creatinine Clearance: 70.9 mL/min (by C-G formula based on SCr of 0.94 mg/dL).  Hepatic:  Results from last 7 days   Lab Units 08/19/22 2116   ALK PHOS U/L 103   BILIRUBIN mg/dL 0.6   ALT (SGPT) U/L 27   AST (SGOT) U/L 21     Arterial Blood Gases:              Lab Results   Component Value Date    LACTATE 1.7 06/05/2022       Cardiac Catheterization/Vascular Study    Result Date: 8/19/2022  Narrative: FINAL     Impression: · Successful PTCA/stent placement to the proximal LAD using a 3.0 x 23 mm Xience drug-eluting stent postdilated with a 3.25 mm NC balloon. · Noncritical disease in the circumflex and RCA. RECOMMENDATIONS: · Brilinta 90 mg p.o. twice daily with a 180 mg load.  The patient has anaphylaxis with aspirin. Indications: Acute anterior ST elevation MI Access: Right femoral Estimated blood loss: Less than 15 mL Procedures: · Left heart catheterization. · Selective coronary angiography. · PTCA/stent placement in the proximal LAD Procedure narrative: The patient  was brought to the catheterization lab emergently.  Access site was prepped and draped in standard sterile fashion.  Lidocaine was injected and arterial access was obtained by percutaneous anterior wall puncture technique.  A 6 Vincentian arterial sheath was placed in the right femoral artery using a modified Seldinger technique.  Selective coronary arteriography was performed using the Bryan technique with a 6 Vincentian 4 curved Bryan right catheter and a 6 Vincentian 4 curved Bryan left catheter.  Nonionic contrast was used and was injected manually.  Left heart pressures were obtained.  No LV gram was performed. At home of the procedure the patient was noted to have an occluded proximal LAD.  A 6 Vincentian Mach 1 JL 4 guide was placed to the level of the left main coronary.  Heparin was administered per protocol for final ACT of greater than 300 seconds.  Initially a  150 wire was used and would not cross the lesion.  Luge wire was then used and would not cross the lesion.  Finally a 0.014 190 cm whisper wire was used and crossed the lesion with moderate difficulty.  A 2.0 x 15 mm mini trek balloon was placed within the lesion was inflated to 10 afia for 20 seconds.  A 3.0 x 23 mm Xience drug-eluting stent was placed within the lesion and deployed at 12 afia for 20 seconds.  Post dilation was performed using the stent balloon at 16 afia for 20 seconds.  A 3.25 x 15 mm NC trek balloon was then placed within the lesion and inflated to 15 afia distally and proximally.  A good result was obtained.  There were no complications. Contrast: 120 ml Hemodynamic Findings: LV pressure: 170/6/10 mmHg, on pull back no gradient was recorded across the aortic valve. Ao pressure: 170/110 mmHg Left ventriculography: Not performed Angiographic Findings: RCA: The right coronary is dominant for the posterior circulation and gives rise to the PDA as well as a posterolateral branch.  The right coronary artery and its branches contain mild  irregularities with a maximal narrowing estimated at 30% proximally.  LMCA: The left main coronary artery gives rise to LAD and circumflex vessels as well as a ramus intermedius branch and is free of disease. Ramus intermedius: The ramus intermedius is a moderate vessel which contains no disease. Circumflex: The circumflex coronary artery gives rise to a large bifurcating first obtuse marginal branch moderate AV groove vessel and 2 distal obtuse marginal branches.  The circumflex contains mild irregularities in the first obtuse marginal branch and is otherwise free of disease. LAD: The LAD gives rise just moderate first diagonal branch large second diagonal branch and then is completely occluded. PTCA/stent placed in the proximal LAD: Preprocedure 100% with COOPER-0 flow Post procedure 0% with COOPER-3 flow Complications none     XR Chest 1 View    Result Date: 8/19/2022  Narrative: DATE OF EXAM: 8/19/2022 9:13 PM  PROCEDURE: XR CHEST 1 VW-  INDICATIONS: Chest Pain Triage Protocol  COMPARISON: 6/5/2022  TECHNIQUE: Single radiographic AP view of the chest was obtained.  FINDINGS: Heart shadow is normal in size, but a little more prominent than on the prior study perhaps due to lordotic film technique. There is mild pulmonary venous hypertension and some coarsening of interstitial markings which is nonspecific. No overt pulmonary edema effusion or lung consolidation or pneumothorax is seen.      Impression: Mild pulmonary venous hypertension.  This report was finalized on 8/19/2022 10:08 PM by Dr. Morris Fischer MD.        Relevant imaging studies and labs from 08/19/22 were reviewed    Medications (drips):  [START ON 8/20/2022] niCARdipine  nitroglycerin, Last Rate: 83.333 mcg/min (08/19/22 2229)  sodium chloride        [START ON 8/20/2022] amLODIPine, 5 mg, Oral, Q24H  fentaNYL citrate (PF), 25 mcg, Intravenous, Once  [START ON 8/20/2022] losartan-HCTZ (HYZAAR) 100-25 combo dose, , Oral, Daily  [START ON 8/20/2022]  metoprolol succinate XL, 100 mg, Oral, Daily  [START ON 8/20/2022] pantoprazole, 40 mg, Oral, Daily  [START ON 8/20/2022] ticagrelor, 90 mg, Oral, BID        •  acetaminophen  •  ALPRAZolam  •  atropine  •  HYDROcodone-acetaminophen  •  [START ON 8/20/2022] methocarbamol  •  Morphine **AND** naloxone  •  sodium chloride  •  sodium chloride     Assessment & Plan   IMPRESSION / PLAN     Inpatient Problem List:  47 y.o.female:  Active Hospital Problems    Diagnosis    • ST elevation myocardial infarction (STEMI), unspecified artery (HCC)         Impression:  47 y.o.female with relevant PMH of  lifetime non-smoking, HTN, prior splenectomy and tail of pancreas resection 2013 for cyst, h/o idiopathic pancreatitis admitted to [facility] 6/5-6/10/22, h/o self reported anaphylaxis with Aspirin, ADHD, Anxiety, BPD, Migraines who presented to [facility] 8/19/22 with chest pain. Found to have anterolateral / inferior STEMI.  Underwent LHC and had stent to proximal LAD by Dr. Goncalves.  Non-critical disease in LCx and RCA.    Plan:    CAD w/ STEMI s/p Stent LAD  Aspirin Allergy  Per Cardiology    HTN  NTG gtt +/- cardene to keep SBP < 150.  Start po meds tomorrow.    ADHD  Anxiety  BPD  Home meds    Glycemic Control  Check A1c    Nutrition  Dietary Orders (From admission, onward)     Start     Ordered    08/19/22 2227  Diet Regular; Consistent Carbohydrate, Cardiac  Diet Effective Now        Question Answer Comment   Diet Texture / Consistency Regular    Common Modifiers Consistent Carbohydrate    Common Modifiers Cardiac        08/19/22 2229              High risk secondary to vasoactive infusions to control BP, post op emergency surgery with risk factors       Adam Parsons MD  Intensive Care Medicine  08/19/22 23:25 EDT

## 2022-08-20 NOTE — H&P
"[facility] Cardiology Consult Note      Referring Provider: No ref. provider found  Primary Provider:  Provider, No Known      Chief complaint chest pain    Identification: 47-year-old  female    Problem list:    1. Coronary artery disease  2. Hypertension   3. Pancreatitis    Allergies:  Aspirin and Penicillins    Home/Current Medications:          History of present illness: Onset of chest pain approximately 8 PM.    Cardiac Risk Factors: Hypertension.  Unknown cholesterol status.  No family history no diabetes no tobacco use    Social History: No tobacco use no alcohol use    Family History: No family history of coronary disease    Review of Systems  Pertinent positives are listed in the HPI.  All other systems reviewed are negative.         Objective     Vital Sign Min/Max for last 24 hours  Temp  Min: 98.5 °F (36.9 °C)  Max: 98.5 °F (36.9 °C)   BP  Min: 147/105  Max: 192/119   Pulse  Min: 67  Max: 90   Resp  Min: 16  Max: 16   SpO2  Min: 90 %  Max: 99 %   No data recorded   Weight  Min: 79.8 kg (176 lb)  Max: 79.8 kg (176 lb)     Flowsheet Rows    Flowsheet Row First Filed Value   Admission Height 157.5 cm (62\") Documented at 08/19/2022 2124   Admission Weight 79.8 kg (176 lb) Documented at 08/19/2022 2124          Physical Exam:    General:  HEENT: Sclera anicteric.  Oropharynx moist  CV: Regular rate and rhythm without murmur gallop or rub  Resp: Clear with no rales or wheezes anterolaterally  GI: Abdomen soft  : deferred  Musculoskeletal: No gross joint deformities noted  Skin: Warm and dry  Neurologic: Nonfocal  Psychiatric: Anxious  Extremities: 2+ pedal pulses bilaterally.     EKG: ST elevation in the anterolateral and inferior leads.    Echo: Pending    Labs:      Results from last 7 days   Lab Units 08/19/22 2116   SODIUM mmol/L 142   POTASSIUM mmol/L 3.9   CHLORIDE mmol/L 106   CO2 mmol/L 23.0   BUN mg/dL 16   CREATININE mg/dL 0.94   CALCIUM mg/dL 9.5   BILIRUBIN mg/dL 0.6 "   ALK PHOS U/L 103   ALT (SGPT) U/L 27   AST (SGOT) U/L 21   GLUCOSE mg/dL 163*     @LABRCNTIP(wbc:3,hgb:3,hct:3,PLT:3,NEUTOPHILPCT:3;LYMPHOPCT:3,MONOPCT  )  Lab Results (last 24 hours)     Procedure Component Value Units Date/Time    [place] Draw [[id number]] Collected: 08/19/22 2116    Specimen: Blood Updated: 08/19/22 2232    Narrative:      The following orders were created for panel order [place] Draw.  Procedure                               Abnormality         Status                     ---------                               -----------         ------                     Green Top (Gel)[[id number]]                                  Final result               Lavender Top[[id number]]                                     Final result               Gold Top - SST[[id number]]                                   Final result               [place] Top[[id number]]                                         In process                 Light Blue Top[[id number]]                                   Final result                 Please view results for these tests on the individual orders.    Gold Top - SST [[id number]] Collected: 08/19/22 2116    Specimen: Blood Updated: 08/19/22 2232     Extra Tube Hold for add-ons.     Comment: Auto resulted.       Green Top (Gel) [[id number]] Collected: 08/19/22 2116    Specimen: Blood Updated: 08/19/22 2232     Extra Tube Hold for add-ons.     Comment: Auto resulted.       Lavender Top [[id number]] Collected: 08/19/22 2116    Specimen: Blood Updated: 08/19/22 2232     Extra Tube hold for add-on     Comment: Auto resulted       Light Blue Top [[id number]] Collected: 08/19/22 2116    Specimen: Blood Updated: 08/19/22 2232     Extra Tube Hold for add-ons.     Comment: Auto resulted       Magnesium [[id number]]  (Normal) Collected: 08/19/22 2116    Specimen: Blood Updated: 08/19/22 2204     Magnesium 2.2 mg/dL     Comprehensive Metabolic Panel [[id number]]  (Abnormal) Collected: 08/19/22 2116    Specimen:  Blood Updated: 08/19/22 2151     Glucose 163 mg/dL      BUN 16 mg/dL      Creatinine 0.94 mg/dL      Sodium 142 mmol/L      Potassium 3.9 mmol/L      Comment: Slight hemolysis detected by analyzer. Results may be affected.        Chloride 106 mmol/L      CO2 23.0 mmol/L      Calcium 9.5 mg/dL      Total Protein 7.3 g/dL      Albumin 4.10 g/dL      ALT (SGPT) 27 U/L      AST (SGOT) 21 U/L      Alkaline Phosphatase 103 U/L      Total Bilirubin 0.6 mg/dL      Globulin 3.2 gm/dL      Comment: Calculated Result        A/G Ratio 1.3 g/dL      BUN/Creatinine Ratio 17.0     Anion Gap 13.0 mmol/L      eGFR 74.5 mL/min/1.73      Comment: National Kidney Foundation and American Society of Nephrology (ASN) Task Force recommended calculation based on the Chronic Kidney Disease Epidemiology Collaboration (CKD-EPI) equation refit without adjustment for race.       Narrative:      GFR Normal >60  Chronic Kidney Disease <60  Kidney Failure <15      COVID PRE-OP / PRE-PROCEDURE SCREENING ORDER (NO ISOLATION) - Swab, Nasopharynx [[id number]]  (Normal) Collected: 08/19/22 2122    Specimen: Swab from Nasopharynx Updated: 08/19/22 2146    Narrative:      The following orders were created for panel order COVID PRE-OP / PRE-PROCEDURE SCREENING ORDER (NO ISOLATION) - Swab, Nasopharynx.  Procedure                               Abnormality         Status                     ---------                               -----------         ------                     COVID-19, ABBOTT IN-HOUS...[[id number]]  Normal              Final result                 Please view results for these tests on the individual orders.    COVID-19, ABBOTT IN-HOUSE,NASAL Swab (NO TRANSPORT MEDIA) 2 HR TAT - Swab, Nasopharynx [[id number]]  (Normal) Collected: 08/19/22 2122    Specimen: Swab from Nasopharynx Updated: 08/19/22 2146     COVID19 Presumptive Negative    Narrative:      Fact sheet for providers: https://www.fda.gov/media/450600/download     Fact sheet for  patients: https://www.fda.gov/media/109217/download    Test performed by PCR.  If inconsistent with clinical signs and symptoms patient should be tested with different authorized molecular test.    Troponin [[id number]]  (Normal) Collected: 08/19/22 2116    Specimen: Blood Updated: 08/19/22 2145     Troponin T <0.010 ng/mL     Narrative:      Troponin T Reference Range:  <= 0.03 ng/mL-   Negative for AMI  >0.03 ng/mL-     Abnormal for myocardial necrosis.  Clinicians would have to utilize clinical acumen, EKG, Troponin and serial changes to determine if it is an Acute Myocardial Infarction or myocardial injury due to an underlying chronic condition.       Results may be falsely decreased if patient taking Biotin.      Lipase [[id number]]  (Normal) Collected: 08/19/22 2116    Specimen: Blood Updated: 08/19/22 2145     Lipase 40 U/L     D-dimer, Quantitative [[id number]]  (Normal) Collected: 08/19/22 2116    Specimen: Blood Updated: 08/19/22 2143     D-Dimer, Quantitative 0.29 MCGFEU/mL     Narrative:      The D-Dimer assay test is to be used in conjunction with a clinical pretest probability (PTP) assessment model, and has been approved by the FDA to exclude pulmonary embolism (PE) and deep venous thrombosis (DVT) in outpatients suspected of PE or DVT, with a cutoff of 0.5 MCGFEU/mL.    aPTT [[id number]]  (Abnormal) Collected: 08/19/22 2116    Specimen: Blood Updated: 08/19/22 2143     PTT 29.5 seconds     Narrative:      PTT = The equivalent PTT values for the therapeutic range of heparin levels at 0.3 to 0.5 U/ml are 60 to 70 seconds.    Protime-INR [[id number]]  (Abnormal) Collected: 08/19/22 2116    Specimen: Blood Updated: 08/19/22 2143     Protime 11.3 Seconds      INR 0.83    BNP [[id number]]  (Normal) Collected: 08/19/22 2116    Specimen: Blood Updated: 08/19/22 2142     proBNP 141.7 pg/mL     Narrative:      Among patients with dyspnea, NT-proBNP is highly sensitive for the detection of acute congestive heart  failure. In addition NT-proBNP of <300 pg/ml effectively rules out acute congestive heart failure with 99% negative predictive value.    Results may be falsely decreased if patient taking Biotin.      Heparin Anti-Xa [[id number]]  (Abnormal) Collected: 08/19/22 2116    Specimen: Blood Updated: 08/19/22 2142     Heparin Anti-Xa (UFH) 0.10 IU/ml     CBC Auto Differential [[id number]]  (Abnormal) Collected: 08/19/22 2116    Specimen: Blood Updated: 08/19/22 2129     WBC 12.23 10*3/mm3      RBC 5.47 10*6/mm3      Hemoglobin 16.5 g/dL      Hematocrit 49.2 %      MCV 89.9 fL      MCH 30.2 pg      MCHC 33.5 g/dL      RDW 13.5 %      RDW-SD 44.5 fl      MPV 9.6 fL      Platelets 398 10*3/mm3      nRBC 0.0 /100 WBC     CBC & Differential [[id number]]  (Abnormal) Collected: 08/19/22 2116    Specimen: Blood Updated: 08/19/22 2126    Narrative:      The following orders were created for panel order CBC & Differential.  Procedure                               Abnormality         Status                     ---------                               -----------         ------                     CBC Auto Differential[[id number]]        Abnormal            Preliminary result         Scan Slide[[id number]]                                       In process                   Please view results for these tests on the individual orders.    Scan Slide [[id number]] Collected: 08/19/22 2116    Specimen: Blood Updated: 08/19/22 2126    Gray Top [[id number]] Collected: 08/19/22 2116    Specimen: Blood Updated: 08/19/22 2122        Lab Results   Component Value Date    TROPONINT <0.010 08/19/2022    TROPONINT <0.010 06/05/2022        Lab Results   Component Value Date    CHOL 202 (H) 06/06/2022     Lab Results   Component Value Date    HDL 51 06/06/2022     No results found for: LDLDIRECT  Lab Results   Component Value Date    TRIG 86 06/06/2022    TRIG 130 06/05/2022     No components found for: CHOLHDL  Lab Results   Component Value Date    INR 0.83  (L) 08/19/2022    PROTIME 11.3 (L) 08/19/2022       Ejection Fraction:      Results Review:  I reviewed the patient's new clinical results.      Assessment:    1. Acute anterior MI status post stent placement to the LAD  2. Hypertension  3. Unknown cholesterol status  4. Pancreatitis      Plan:    Brilinta as the patient has anaphylaxis with aspirin  Lipitor  Echo in a.m.  The patient is already on metoprolol and losartan at home.      I discussed the patient's findings and my recommendations with patient.    Jeane Goncalves MD  08/19/22  22:37 EDT

## 2022-08-20 NOTE — PLAN OF CARE
Goal Outcome Evaluation:  Plan of Care Reviewed With: patient        Progress: improving   NSR VSS on ntg drip. Prns for H/A. Denies acute chest pain. Remains on room air. On transfer to floor. Right groin intact free of bruising. Distal pulses intact.continue to monitor.

## 2022-08-20 NOTE — PROGRESS NOTES
INTENSIVIST   PROGRESS NOTE        S     Rebekah 47 y.o. female is followed for: Chest Pain       ST elevation myocardial infarction (STEMI), unspecified artery (HCC)    As an Intensivist, we provide an integrated approach to the ICU patient and family, medical management of comorbid conditions, including but not limited to electrolytes, glycemic control, organ dysfunction, lead interdisciplinary rounds and coordinate the care with all other services, including those from other specialists.     Interval History:  POD: 1 Day Post-Op (LHC)    No acute events overnight.   Still sore on her chest, but nothing like yesterday.    The patient has a COVID HM Topic on their chart, and they are fully vaccinated.     Temp  Min: 97.8 °F (36.6 °C)  Max: 98.5 °F (36.9 °C)       History     Last Reviewed by Naersh Pennington MD on 8/20/2022 at  3:06 PM    Sections Reviewed    Medical, Family, Tobacco, Alcohol, Drug Use, Sexual Activity, Social   Documentation      Problem list reviewed by Naresh Pennington MD on 8/20/2022 at  3:06 PM  Medicines reviewed by Naresh Pennington MD on 8/20/2022 at  3:06 PM  Allergies reviewed by Naresh Pennington MD on 8/20/2022 at  3:06 PM       The patient's relevant past medical, surgical and social history were reviewed and updated in Epic as appropriate.        O     Vitals:  Temp: 98.1 °F (36.7 °C) (08/20/22 1200) Temp  Min: 97.8 °F (36.6 °C)  Max: 98.5 °F (36.9 °C)   Temp core:      BP: (!) 151/102 (08/20/22 1430) BP  Min: 90/52  Max: 192/119   Pulse: 77 (08/20/22 1430) Pulse  Min: 61  Max: 98   Resp: 18 (08/20/22 1400) Resp  Min: 14  Max: 18   SpO2: 93 % (08/20/22 1430) SpO2  Min: 90 %  Max: 99 %   Device: room air (08/20/22 1400)    Flow Rate: 2 (08/20/22 0000) Flow (L/min)  Min: 2  Max: 2         08/19/22 2124 08/20/22  1135   Weight: 79.8 kg (176 lb) 79.8 kg (175 lb 14.8 oz)        Intake/Ouptut 24 hrs (7:00AM - 6:59 AM)  Intake & Output (last 3 days)       08/17 0701 08/18 0700 08/18 0701 08/19  0700 08/19 0701  08/20 0700 08/20 0701 08/21 0700    P.O.    500    I.V. (mL/kg)   425 (5.3)     Total Intake(mL/kg)   425 (5.3) 500 (6.3)    Urine (mL/kg/hr)    1350 (2.1)    Total Output    1350    Net   +425 -850            Urine Unmeasured Occurrence   2 x           Medications (drips):  niCARdipine  nitroglycerin, Last Rate: 25 mcg/min (08/20/22 1430)      Physical Examination  Telemetry:  Rhythm: normal sinus rhythm (08/20/22 1400)         Constitutional:  No acute distress.   Cardiovascular: RRR.   Normal heart sounds.  No murmurs, gallop or rub.   Respiratory: Normal breath sounds  No adventitious sounds.   Abdominal:  Soft with no tenderness.  No distension.   No HSM.   Extremities: Warm.  Dry.  No cyanosis.  No Edema   Neurological:   Alert, Oriented, Cooperative.  Best Eye Response: 4-->(E4) spontaneous (08/19/22 2253)  Best Motor Response: 6-->(M6) obeys commands (08/19/22 2253)  Best Verbal Response: 5-->(V5) oriented (08/19/22 2253)  Livier Coma Scale Score: 15 (08/19/22 2253)     Results Reviewed:  Laboratory  Microbiology  Radiology  Pathology    Hematology:  Results from last 7 days   Lab Units 08/20/22 0323 08/19/22 2319   WBC 10*3/mm3 13.99* 11.34*   HEMOGLOBIN g/dL 14.4 15.5   MCV fL 88.5 89.0   PLATELETS 10*3/mm3 338 383   NEUTROS ABS 10*3/mm3 9.98* 7.11*   LYMPHS ABS 10*3/mm3 2.88 3.33*   EOS ABS 10*3/mm3 0.03 0.14       Chemistry:  Estimated Creatinine Clearance: 102.5 mL/min (by C-G formula based on SCr of 0.65 mg/dL).    Results from last 7 days   Lab Units 08/20/22 0323 08/19/22 2319   SODIUM mmol/L 139 139   POTASSIUM mmol/L 3.4* 3.7   CHLORIDE mmol/L 107 106   CO2 mmol/L 22.0 21.0*   BUN mg/dL 13 14   CREATININE mg/dL 0.65 0.71   GLUCOSE mg/dL 169* 160*     Results from last 7 days   Lab Units 08/20/22  0323 08/19/22 2319 08/19/22 2116   CALCIUM mg/dL 8.7 8.6 9.5   MAGNESIUM mg/dL  --   --  2.2     Results from last 7 days   Lab Units 08/19/22 2319 08/19/22 2116   BILIRUBIN  mg/dL 0.9 0.6   AST (SGOT) U/L 36* 21   ALT (SGPT) U/L 27 27   ALK PHOS U/L 96 103       Coagulation Labs:  Results from last 7 days   Lab Units 08/19/22 2116   PROTIME Seconds 11.3*   INR  0.83*   APTT seconds 29.5*      Cardiac Labs:    Results from last 7 days   Lab Units 08/19/22  2319 08/19/22 2116   PROBNP pg/mL 189.7 141.7   TROPONIN T ng/mL 1.050* <0.010       Biomarkers:  Results from last 7 days   Lab Units 08/19/22 2116   D DIMER QUANT MCGFEU/mL 0.29     COVID-19  Lab Results   Component Value Date    COVID19 Presumptive Negative 08/19/2022    COVID19 Not Detected 06/06/2022    COVID19 Not Detected 11/18/2020     Images:  Cardiac Catheterization/Vascular Study    Result Date: 8/19/2022  · Successful PTCA/stent placement to the proximal LAD using a 3.0 x 23 mm Xience drug-eluting stent postdilated with a 3.25 mm NC balloon. · Noncritical disease in the circumflex and RCA. RECOMMENDATIONS: · Brilinta 90 mg p.o. twice daily with a 180 mg load.  The patient has anaphylaxis with aspirin. Indications: Acute anterior ST elevation MI Access: Right femoral Estimated blood loss: Less than 15 mL Procedures: · Left heart catheterization. · Selective coronary angiography. · PTCA/stent placement in the proximal LAD Procedure narrative: The patient was brought to the catheterization lab emergently.  Access site was prepped and draped in standard sterile fashion.  Lidocaine was injected and arterial access was obtained by percutaneous anterior wall puncture technique.  A 6 Salv[place]orean arterial sheath was placed in the right femoral artery using a modified Seldinger technique.  Selective coronary arteriography was performed using the Bryan technique with a 6 Salv[place] 4 curved Bryan right catheter and a 6 [place] 4 curved Bryan left catheter.  Nonionic contrast was used and was injected manually.  Left heart pressures were obtained.  No LV gram was performed. At home of the procedure the patient was noted to have an  occluded proximal LAD.  A 6 Vietnamese Mach 1 JL 4 guide was placed to the level of the left main coronary.  Heparin was administered per protocol for final ACT of greater than 300 seconds.  Initially a  150 wire was used and would not cross the lesion.  Luge wire was then used and would not cross the lesion.  Finally a 0.014 190 cm whisper wire was used and crossed the lesion with moderate difficulty.  A 2.0 x 15 mm mini trek balloon was placed within the lesion was inflated to 10 afia for 20 seconds.  A 3.0 x 23 mm Xience drug-eluting stent was placed within the lesion and deployed at 12 afia for 20 seconds.  Post dilation was performed using the stent balloon at 16 afia for 20 seconds.  A 3.25 x 15 mm NC trek balloon was then placed within the lesion and inflated to 15 afia distally and proximally.  A good result was obtained.  There were no complications. Contrast: 120 ml Hemodynamic Findings: LV pressure: 170/6/10 mmHg, on pull back no gradient was recorded across the aortic valve. Ao pressure: 170/110 mmHg Left ventriculography: Not performed Angiographic Findings: RCA: The right coronary is dominant for the posterior circulation and gives rise to the PDA as well as a posterolateral branch.  The right coronary artery and its branches contain mild irregularities with a maximal narrowing estimated at 30% proximally.  LMCA: The left main coronary artery gives rise to LAD and circumflex vessels as well as a ramus intermedius branch and is free of disease. Ramus intermedius: The ramus intermedius is a moderate vessel which contains no disease. Circumflex: The circumflex coronary artery gives rise to a large bifurcating first obtuse marginal branch moderate AV groove vessel and 2 distal obtuse marginal branches.  The circumflex contains mild irregularities in the first obtuse marginal branch and is otherwise free of disease. LAD: The LAD gives rise just moderate first diagonal branch large second diagonal branch and  then is completely occluded. PTCA/stent placed in the proximal LAD: Preprocedure 100% with COOPER-0 flow Post procedure 0% with COOPER-3 flow Complications none     XR Chest 1 View    Result Date: 2022  Mild pulmonary venous hypertension.  This report was finalized on 2022 10:08 PM by Dr. Morris Fischer MD.        Echo:    Results: Reviewed.  I reviewed the patient's new laboratory and imaging results.  I independently reviewed the patient's new images.    Medications: Reviewed.    Assessment    A / P     Hospital:  LOS: 1 day   ICU: 16h      Diagnosis   • ST elevation myocardial infarction (STEMI), unspecified artery (HCC) [I21.3]     48 y/o ? who presented to [facility] 22 with chest pain secondary to an anterolateral / inferior STEMI.     Assessment/Management/Treatment Plan:    1. Cardiovascular  a. AMI - Anterolateral STEMI, [facility] LAD stent on 22   b. CAD  c. HTN ? Treatment with ARB and ß-blockers   2. Neuro  a. ADHD  b. Anxiety  3. GI/Hepatology  a. Idiopathic pancreatitis  4. Allergy to ASA when she was a child (8 yo), swelling face.  5. Endocrine  a. At risk for DM (pre-diabetes) based on her HbA1c. [HbA1C 5.7%-6.4%, eA-139 mg/dL].     Results from last 7 days   Lab Units 22  2321   GLUCOSE mg/dL 163*     Lab Results   Lab Value Date/Time    HGBA1C 6.00 (H) 2022 0547    HGBA1C 5.80 (H) 2019 1441       Diet: Diet Regular; Consistent Carbohydrate, Cardiac   Advance Directives: Code Status and Medical Interventions:   Ordered at: 22 2229     Level Of Support Discussed With:    Patient     Code Status (Patient has no pulse and is not breathing):    CPR (Attempt to Resuscitate)     Medical Interventions (Patient has pulse or is breathing):    Full        In brief:  1. Monitor for arrhythmias.  2. Goal: Glucose < 180 mg/dL.   1. Check BMP in AM.  3. Disposition: Transfer to Telemetry Unit    Plan of care and goals reviewed during interdisciplinary rounds.  I discussed the  patient's findings and my recommendations with patient and nursing staff    Level of Risk is High due to:  illness with threat to life or bodily function.     Time: 25 minutes, in direct patient care, with the patient and/or on the curran coordinating care with other health care providers. (This is noncurrent time).    I have spent > 50% percent of this time, counseling and discussing management.     []  Primary Attending Intensive Care Medicine - Nutrition Support   [x]  Consultant

## 2022-08-21 LAB
ANION GAP SERPL CALCULATED.3IONS-SCNC: 12 MMOL/L (ref 5–15)
BASOPHILS # BLD AUTO: 0.04 10*3/MM3 (ref 0–0.2)
BASOPHILS NFR BLD AUTO: 0.4 % (ref 0–1.5)
BUN SERPL-MCNC: 13 MG/DL (ref 6–20)
BUN/CREAT SERPL: 12.6 (ref 7–25)
CALCIUM SPEC-SCNC: 9.6 MG/DL (ref 8.6–10.5)
CHLORIDE SERPL-SCNC: 103 MMOL/L (ref 98–107)
CO2 SERPL-SCNC: 22 MMOL/L (ref 22–29)
CREAT SERPL-MCNC: 1.03 MG/DL (ref 0.57–1)
DEPRECATED RDW RBC AUTO: 44.1 FL (ref 37–54)
EGFRCR SERPLBLD CKD-EPI 2021: 66.8 ML/MIN/1.73
EOSINOPHIL # BLD AUTO: 0.18 10*3/MM3 (ref 0–0.4)
EOSINOPHIL NFR BLD AUTO: 1.7 % (ref 0.3–6.2)
ERYTHROCYTE [DISTWIDTH] IN BLOOD BY AUTOMATED COUNT: 13.7 % (ref 12.3–15.4)
GLUCOSE BLDC GLUCOMTR-MCNC: 132 MG/DL (ref 70–130)
GLUCOSE BLDC GLUCOMTR-MCNC: 153 MG/DL (ref 70–130)
GLUCOSE SERPL-MCNC: 135 MG/DL (ref 65–99)
HCT VFR BLD AUTO: 51.2 % (ref 34–46.6)
HGB BLD-MCNC: 17.3 G/DL (ref 12–15.9)
IMM GRANULOCYTES # BLD AUTO: 0.04 10*3/MM3 (ref 0–0.05)
IMM GRANULOCYTES NFR BLD AUTO: 0.4 % (ref 0–0.5)
LYMPHOCYTES # BLD AUTO: 3.06 10*3/MM3 (ref 0.7–3.1)
LYMPHOCYTES NFR BLD AUTO: 28.9 % (ref 19.6–45.3)
MAGNESIUM SERPL-MCNC: 2.2 MG/DL (ref 1.6–2.6)
MCH RBC QN AUTO: 30 PG (ref 26.6–33)
MCHC RBC AUTO-ENTMCNC: 33.8 G/DL (ref 31.5–35.7)
MCV RBC AUTO: 88.9 FL (ref 79–97)
MONOCYTES # BLD AUTO: 0.8 10*3/MM3 (ref 0.1–0.9)
MONOCYTES NFR BLD AUTO: 7.6 % (ref 5–12)
NEUTROPHILS NFR BLD AUTO: 6.47 10*3/MM3 (ref 1.7–7)
NEUTROPHILS NFR BLD AUTO: 61 % (ref 42.7–76)
NRBC BLD AUTO-RTO: 0 /100 WBC (ref 0–0.2)
PLATELET # BLD AUTO: 386 10*3/MM3 (ref 140–450)
PMV BLD AUTO: 9.7 FL (ref 6–12)
POTASSIUM SERPL-SCNC: 4.4 MMOL/L (ref 3.5–5.2)
RBC # BLD AUTO: 5.76 10*6/MM3 (ref 3.77–5.28)
SODIUM SERPL-SCNC: 137 MMOL/L (ref 136–145)
TROPONIN T SERPL-MCNC: 0.31 NG/ML (ref 0–0.03)
WBC NRBC COR # BLD: 10.59 10*3/MM3 (ref 3.4–10.8)

## 2022-08-21 PROCEDURE — 63710000001 INSULIN LISPRO (HUMAN) PER 5 UNITS: Performed by: INTERNAL MEDICINE

## 2022-08-21 PROCEDURE — 84484 ASSAY OF TROPONIN QUANT: CPT | Performed by: INTERNAL MEDICINE

## 2022-08-21 PROCEDURE — 80048 BASIC METABOLIC PNL TOTAL CA: CPT | Performed by: INTERNAL MEDICINE

## 2022-08-21 PROCEDURE — 85025 COMPLETE CBC W/AUTO DIFF WBC: CPT | Performed by: INTERNAL MEDICINE

## 2022-08-21 PROCEDURE — 82962 GLUCOSE BLOOD TEST: CPT

## 2022-08-21 PROCEDURE — 99232 SBSQ HOSP IP/OBS MODERATE 35: CPT | Performed by: INTERNAL MEDICINE

## 2022-08-21 PROCEDURE — 83735 ASSAY OF MAGNESIUM: CPT | Performed by: NURSE PRACTITIONER

## 2022-08-21 PROCEDURE — 25010000002 ENOXAPARIN PER 10 MG: Performed by: INTERNAL MEDICINE

## 2022-08-21 PROCEDURE — 25010000002 HYDROMORPHONE PER 4 MG: Performed by: INTERNAL MEDICINE

## 2022-08-21 RX ORDER — HYDROMORPHONE HYDROCHLORIDE 1 MG/ML
0.25 INJECTION, SOLUTION INTRAMUSCULAR; INTRAVENOUS; SUBCUTANEOUS ONCE
Status: COMPLETED | OUTPATIENT
Start: 2022-08-21 | End: 2022-08-21

## 2022-08-21 RX ORDER — INSULIN LISPRO 100 [IU]/ML
0-9 INJECTION, SOLUTION INTRAVENOUS; SUBCUTANEOUS
Status: DISCONTINUED | OUTPATIENT
Start: 2022-08-21 | End: 2022-08-22 | Stop reason: HOSPADM

## 2022-08-21 RX ORDER — ATORVASTATIN CALCIUM 40 MG/1
80 TABLET, FILM COATED ORAL NIGHTLY
Status: DISCONTINUED | OUTPATIENT
Start: 2022-08-21 | End: 2022-08-22 | Stop reason: HOSPADM

## 2022-08-21 RX ORDER — DOCUSATE SODIUM 100 MG/1
200 CAPSULE, LIQUID FILLED ORAL 2 TIMES DAILY PRN
Status: DISCONTINUED | OUTPATIENT
Start: 2022-08-21 | End: 2022-08-22 | Stop reason: HOSPADM

## 2022-08-21 RX ADMIN — HYDROCODONE BITARTRATE AND ACETAMINOPHEN 1 TABLET: 7.5; 325 TABLET ORAL at 21:15

## 2022-08-21 RX ADMIN — ENOXAPARIN SODIUM 40 MG: 40 INJECTION SUBCUTANEOUS at 08:09

## 2022-08-21 RX ADMIN — CLONAZEPAM 0.5 MG: 0.5 TABLET ORAL at 21:15

## 2022-08-21 RX ADMIN — METOPROLOL SUCCINATE 100 MG: 100 TABLET, FILM COATED, EXTENDED RELEASE ORAL at 08:10

## 2022-08-21 RX ADMIN — DOCUSATE SODIUM 200 MG: 100 CAPSULE, LIQUID FILLED ORAL at 09:50

## 2022-08-21 RX ADMIN — TICAGRELOR 90 MG: 90 TABLET ORAL at 21:10

## 2022-08-21 RX ADMIN — INSULIN LISPRO 2 UNITS: 100 INJECTION, SOLUTION INTRAVENOUS; SUBCUTANEOUS at 21:10

## 2022-08-21 RX ADMIN — PANTOPRAZOLE SODIUM 40 MG: 40 TABLET, DELAYED RELEASE ORAL at 08:10

## 2022-08-21 RX ADMIN — TICAGRELOR 90 MG: 90 TABLET ORAL at 08:10

## 2022-08-21 RX ADMIN — ATORVASTATIN CALCIUM 80 MG: 40 TABLET, FILM COATED ORAL at 21:10

## 2022-08-21 RX ADMIN — AMLODIPINE BESYLATE 5 MG: 5 TABLET ORAL at 08:10

## 2022-08-21 RX ADMIN — HYDROMORPHONE HYDROCHLORIDE 0.25 MG: 1 INJECTION, SOLUTION INTRAMUSCULAR; INTRAVENOUS; SUBCUTANEOUS at 11:06

## 2022-08-21 RX ADMIN — SODIUM CHLORIDE 250 ML: 9 INJECTION, SOLUTION INTRAVENOUS at 10:56

## 2022-08-21 RX ADMIN — HYDROCHLOROTHIAZIDE: 25 TABLET ORAL at 08:09

## 2022-08-21 NOTE — PLAN OF CARE
Goal Outcome Evaluation:              Outcome Evaluation: Pt AXO, femoral site clean intact felipa, vss, nsr, room air, pt plesent.

## 2022-08-21 NOTE — PROGRESS NOTES
INTENSIVIST   PROGRESS NOTE        S     Rebekah 47 y.o. female is followed for: Chest Pain       ST elevation myocardial infarction (STEMI), unspecified artery (HCC)    As an Intensivist, we provide an integrated approach to the ICU patient and family, medical management of comorbid conditions, including but not limited to electrolytes, glycemic control, organ dysfunction, lead interdisciplinary rounds and coordinate the care with all other services, including those from other specialists.     Interval History:  POD: 2 Days Post-Op (LHC)    No acute events overnight.     Leg cramps.    Off NTG.    The patient has a COVID HM Topic on their chart, and they are fully vaccinated.     Temp  Min: 97.7 °F (36.5 °C)  Max: 98.3 °F (36.8 °C)       History     Last Reviewed by Naresh Pennington MD on 8/20/2022 at  3:06 PM    Sections Reviewed    Medical, Family, Tobacco, Alcohol, Drug Use, Sexual Activity, Social   Documentation      Problem list reviewed by Naresh Pennington MD on 8/20/2022 at  3:06 PM  Medicines reviewed by Naresh Pennington MD on 8/20/2022 at  3:06 PM  Allergies reviewed by Naresh Pennington MD on 8/20/2022 at  3:06 PM       The patient's relevant past medical, surgical and social history were reviewed and updated in Epic as appropriate.        O     Vitals:  Temp: 98.1 °F (36.7 °C) (08/21/22 1200) Temp  Min: 97.7 °F (36.5 °C)  Max: 98.3 °F (36.8 °C)   Temp core:      BP: 103/76 (08/21/22 1200) BP  Min: 103/76  Max: 154/103   Pulse: 73 (08/21/22 1200) Pulse  Min: 60  Max: 83   Resp: 18 (08/21/22 1200) Resp  Min: 16  Max: 18   SpO2: 94 % (08/21/22 1200) SpO2  Min: 91 %  Max: 97 %   Device: room air (08/21/22 1200)    Flow Rate: 2 (08/20/22 0000) No data recorded         08/19/22 2124 08/20/22  1135   Weight: 79.8 kg (176 lb) 79.8 kg (175 lb 14.8 oz)        Intake/Ouptut 24 hrs (7:00AM - 6:59 AM)  Intake & Output (last 3 days)       08/18 0701 08/19 0700 08/19 0701 08/20 0700 08/20 0701 08/21 0700 08/21  0701  08/22 0700    P.O.   1040 540    I.V. (mL/kg)  425 (5.3) 335 (4.2)     Total Intake(mL/kg)  425 (5.3) 1375 (17.2) 540 (6.8)    Urine (mL/kg/hr)   3175 (1.7) 650 (1.4)    Total Output   3175 650    Net  +425 -1800 -110            Urine Unmeasured Occurrence  2 x 2 x           Medications (drips):  niCARdipine  nitroglycerin, Last Rate: Stopped (08/20/22 1942)      Physical Examination  Telemetry:  Rhythm: normal sinus rhythm (08/21/22 1200)         Constitutional:  No acute distress.   Cardiovascular: RRR.   Normal heart sounds.  No murmurs, gallop or rub.   Respiratory: Normal breath sounds  No adventitious sounds.   Abdominal:  Soft with no tenderness.  No distension.   No HSM.   Extremities: Warm.  Dry.  No cyanosis.  No Edema   Neurological:   Alert, Oriented, Cooperative.  Best Eye Response: 4-->(E4) spontaneous (08/21/22 0600)  Best Motor Response: 6-->(M6) obeys commands (08/21/22 0600)  Best Verbal Response: 5-->(V5) oriented (08/21/22 0600)  Leslie Coma Scale Score: 15 (08/21/22 0600)     Results Reviewed:  Laboratory  Microbiology  Radiology  Pathology    Hematology:  Results from last 7 days   Lab Units 08/21/22  0438 08/20/22  0323   WBC 10*3/mm3 10.59 13.99*   HEMOGLOBIN g/dL 17.3* 14.4   MCV fL 88.9 88.5   PLATELETS 10*3/mm3 386 338   NEUTROS ABS 10*3/mm3 6.47 9.98*   LYMPHS ABS 10*3/mm3 3.06 2.88   EOS ABS 10*3/mm3 0.18 0.03       Chemistry:  Estimated Creatinine Clearance: 64.7 mL/min (A) (by C-G formula based on SCr of 1.03 mg/dL (H)).    Results from last 7 days   Lab Units 08/21/22  0438 08/20/22  0323   SODIUM mmol/L 137 139   POTASSIUM mmol/L 4.4 3.4*   CHLORIDE mmol/L 103 107   CO2 mmol/L 22.0 22.0   BUN mg/dL 13 13   CREATININE mg/dL 1.03* 0.65   GLUCOSE mg/dL 135* 169*     Results from last 7 days   Lab Units 08/21/22  0438 08/20/22  0323 08/19/22  2319 08/19/22  2116   CALCIUM mg/dL 9.6 8.7   < > 9.5   MAGNESIUM mg/dL 2.2  --   --  2.2    < > = values in this interval not displayed.       Cardiac Labs:    Results from last 7 days   Lab Units 08/21/22  0438 08/19/22  2319 08/19/22  2116   PROBNP pg/mL  --  189.7 141.7   TROPONIN T ng/mL 0.309* 1.050* <0.010     COVID-19  Lab Results   Component Value Date    COVID19 Presumptive Negative 08/19/2022    COVID19 Not Detected 06/06/2022    COVID19 Not Detected 11/18/2020     Images:  Cardiac Catheterization/Vascular Study    Result Date: 8/19/2022  · Successful PTCA/stent placement to the proximal LAD using a 3.0 x 23 mm Xience drug-eluting stent postdilated with a 3.25 mm NC balloon. · Noncritical disease in the circumflex and RCA. RECOMMENDATIONS: · Brilinta 90 mg p.o. twice daily with a 180 mg load.  The patient has anaphylaxis with aspirin. Indications: Acute anterior ST elevation MI Access: Right femoral Estimated blood loss: Less than 15 mL Procedures: · Left heart catheterization. · Selective coronary angiography. · PTCA/stent placement in the proximal LAD Procedure narrative: The patient was brought to the catheterization lab emergently.  Access site was prepped and draped in standard sterile fashion.  Lidocaine was injected and arterial access was obtained by percutaneous anterior wall puncture technique.  A 6 [place] Islander arterial sheath was placed in the right femoral artery using a modified Seldinger technique.  Selective coronary arteriography was performed using the Bryan technique with a 6 [place] 4 curved Bryan right catheter and a 6 [place] 4 curved Bryan left catheter.  Nonionic contrast was used and was injected manually.  Left heart pressures were obtained.  No LV gram was performed. At home of the procedure the patient was noted to have an occluded proximal LAD.  A 6 [place] Mach 1 JL 4 guide was placed to the level of the left main coronary.  Heparin was administered per protocol for final ACT of greater than 300 seconds.  Initially a  150 wire was used and would not cross the lesion.  Luge wire was then used and would not cross  the lesion.  Finally a 0.014 190 cm whisper wire was used and crossed the lesion with moderate difficulty.  A 2.0 x 15 mm mini trek balloon was placed within the lesion was inflated to 10 afia for 20 seconds.  A 3.0 x 23 mm Xience drug-eluting stent was placed within the lesion and deployed at 12 afia for 20 seconds.  Post dilation was performed using the stent balloon at 16 afia for 20 seconds.  A 3.25 x 15 mm NC trek balloon was then placed within the lesion and inflated to 15 afia distally and proximally.  A good result was obtained.  There were no complications. Contrast: 120 ml Hemodynamic Findings: LV pressure: 170/6/10 mmHg, on pull back no gradient was recorded across the aortic valve. Ao pressure: 170/110 mmHg Left ventriculography: Not performed Angiographic Findings: RCA: The right coronary is dominant for the posterior circulation and gives rise to the PDA as well as a posterolateral branch.  The right coronary artery and its branches contain mild irregularities with a maximal narrowing estimated at 30% proximally.  LMCA: The left main coronary artery gives rise to LAD and circumflex vessels as well as a ramus intermedius branch and is free of disease. Ramus intermedius: The ramus intermedius is a moderate vessel which contains no disease. Circumflex: The circumflex coronary artery gives rise to a large bifurcating first obtuse marginal branch moderate AV groove vessel and 2 distal obtuse marginal branches.  The circumflex contains mild irregularities in the first obtuse marginal branch and is otherwise free of disease. LAD: The LAD gives rise just moderate first diagonal branch large second diagonal branch and then is completely occluded. PTCA/stent placed in the proximal LAD: Preprocedure 100% with COOPER-0 flow Post procedure 0% with COOPER-3 flow Complications none     XR Chest 1 View    Result Date: 8/19/2022  Mild pulmonary venous hypertension.  This report was finalized on 8/19/2022 10:08 PM by Dr. Caceres  MD Amado.        Echo:  Results for orders placed during the hospital encounter of 22    Adult Transthoracic Echo Complete W/ Cont if Necessary Per Protocol    Interpretation Summary  · Estimated left ventricular EF = 50% Left ventricular systolic function is low normal. Severe distal anteroseptal and apical hypokinesis.  · Left ventricular diastolic function was normal.  · Mild mitral and tricuspid regurgitation.  · Calculated right ventricular systolic pressure from tricuspid regurgitation is 26 mmHg.    Results: Reviewed.  I reviewed the patient's new laboratory and imaging results.  I independently reviewed the patient's new images.    Medications: Reviewed.    Assessment    A / P     Hospital:  LOS: 2 days   ICU: 1d 14h      Diagnosis   • ST elevation myocardial infarction (STEMI), unspecified artery (HCC) [I21.3]     46 y/o ? who presented to [facility] 22 with chest pain secondary to an anterolateral / inferior STEMI.     Assessment/Management/Treatment Plan:    1. Cardiovascular  a. AMI - Anterolateral STEMI, [facility] LAD stent on 22   b. CAD  c. HTN ? Treatment with ARB and ß-blockers   2. Neuro  a. ADHD  b. Anxiety  3. GI/Hepatology  a. Idiopathic pancreatitis  4. Allergy to ASA when she was a child (6 yo), swelling face.  5. Endocrine  a. At risk for DM (pre-diabetes) based on her HbA1c. [HbA1C 5.7%-6.4%, eA-139 mg/dL].     Results from last 7 days   Lab Units 22  2321   GLUCOSE mg/dL 163*     Lab Results   Lab Value Date/Time    HGBA1C 6.00 (H) 2022 0547    HGBA1C 5.80 (H) 2019 1441       Diet: Diet Regular; Consistent Carbohydrate, Cardiac   Advance Directives: Code Status and Medical Interventions:   Ordered at: 22 2229     Level Of Support Discussed With:    Patient     Code Status (Patient has no pulse and is not breathing):    CPR (Attempt to Resuscitate)     Medical Interventions (Patient has pulse or is breathing):    Full        In brief:  1. Monitor for  arrhythmias.  2. Goal: Glucose < 180 mg/dL.   1. Check BMP in AM, and POC Glucose AC and hs.  3. sCr up some since yesterday. Encourage fluids.    Lab Results   Lab Value Date/Time    CREATININE 1.03 (H) 08/21/2022 0438    CREATININE 0.65 08/20/2022 0323    CREATININE 0.71 08/19/2022 2319     4. Disposition: Transfer to Telemetry Unit (Awaiting bed since yesterday). Probably home tomorrow as per Cardiology.  1. Labs in AM    Plan of care and goals reviewed during interdisciplinary rounds.  I discussed the patient's findings and my recommendations with patient and nursing staff    Level of Risk is High due to:  illness with threat to life or bodily function.     Time: 25 minutes, in direct patient care, with the patient and/or on the curran coordinating care with other health care providers. (This is noncurrent time).    I have spent > 50% percent of this time, counseling and discussing management.     []  Primary Attending Intensive Care Medicine - Nutrition Support   [x]  Consultant

## 2022-08-21 NOTE — PROGRESS NOTES
"[facility] Cardiology Daily Note       LOS: 2 days   Patient Care Team:  Provider, No Known as PCP - General    Chief Complaint: Acute anterior MI    Subjective     Subjective: No complaints.  Breathing is a little bit better than yesterday.  Has a little bit of numbness in her fifth digit on her right hand but it seems to get better with rubbing it.  95% on room air.  Heart rates have been good.  Blood pressures have ranged from 115 to 144 mmHg systolic.    Review of Systems:   As above.    Medications:  amLODIPine, 5 mg, Oral, Q24H  atorvastatin, 40 mg, Oral, Nightly  enoxaparin, 40 mg, Subcutaneous, Daily  losartan-HCTZ (HYZAAR) 100-25 combo dose, , Oral, Daily  metoprolol succinate XL, 100 mg, Oral, Daily  pantoprazole, 40 mg, Oral, Daily  pharmacy consult - MTM, , Does not apply, Daily  ticagrelor, 90 mg, Oral, BID        Objective     Vital Sign Min/Max for last 24 hours  Temp  Min: 97.7 °F (36.5 °C)  Max: 98.3 °F (36.8 °C)   BP  Min: 112/82  Max: 154/103   Pulse  Min: 60  Max: 90   Resp  Min: 16  Max: 18   SpO2  Min: 91 %  Max: 98 %   No data recorded   Weight  Min: 79.8 kg (175 lb 14.8 oz)  Max: 79.8 kg (175 lb 14.8 oz)      Intake/Output Summary (Last 24 hours) at 8/21/2022 0843  Last data filed at 8/21/2022 0800  Gross per 24 hour   Intake 1475 ml   Output 3175 ml   Net -1700 ml        Flowsheet Rows    Flowsheet Row First Filed Value   Admission Height 157.5 cm (62\") Documented at 08/19/2022 2124   Admission Weight 79.8 kg (176 lb) Documented at 08/19/2022 2124          Physical Exam:    General: Alert and oriented.   Cardiovascular: Heart has a nondisplaced focal PMI. Regular rate and rhythm without murmur, gallop or rub.  Lungs: Clear without rales or wheezes. Equal expansion is noted.   Abdomen: Soft, nontender.  Extremities: Show no edema. No hematoma or bruit in the right groin  Skin: warm and dry.     Results Review:    I reviewed the patient's new clinical results.  EKG:  Tele: " Sinus rhythm    Labs:    Results from last 7 days   Lab Units 08/21/22 0438 08/20/22 0323 08/19/22 2319 08/19/22 2116   SODIUM mmol/L 137 139 139 142   POTASSIUM mmol/L 4.4 3.4* 3.7 3.9   CHLORIDE mmol/L 103 107 106 106   CO2 mmol/L 22.0 22.0 21.0* 23.0   BUN mg/dL 13 13 14 16   CREATININE mg/dL 1.03* 0.65 0.71 0.94   CALCIUM mg/dL 9.6 8.7 8.6 9.5   BILIRUBIN mg/dL  --   --  0.9 0.6   ALK PHOS U/L  --   --  96 103   ALT (SGPT) U/L  --   --  27 27   AST (SGOT) U/L  --   --  36* 21   GLUCOSE mg/dL 135* 169* 160* 163*     Results from last 7 days   Lab Units 08/21/22 0438 08/20/22 0323 08/19/22 2319 08/19/22 2116   WBC 10*3/mm3 10.59 13.99* 11.34* 12.23*   HEMOGLOBIN g/dL 17.3* 14.4 15.5 16.5*   HEMATOCRIT % 51.2* 41.7 46.1 49.2*   PLATELETS 10*3/mm3 386 338 383 398   MONOCYTES % %  --   --   --  3.0*     Lab Results   Component Value Date    TROPONINT 1.050 (C) 08/19/2022    TROPONINT <0.010 08/19/2022    TROPONINT <0.010 06/05/2022     Lab Results   Component Value Date    CHOL 300 (H) 08/19/2022    CHOL 202 (H) 06/06/2022     Lab Results   Component Value Date    TRIG 57 08/19/2022    TRIG 86 06/06/2022    TRIG 130 06/05/2022     Lab Results   Component Value Date    HDL 81 (H) 08/19/2022    HDL 51 06/06/2022     No components found for: LDLCALC  Lab Results   Component Value Date    INR 0.83 (L) 08/19/2022    PROTIME 11.3 (L) 08/19/2022         Ejection Fraction:    Assessment   Assessment:  1. Coronary artery disease   1. status post stenting to the proximal LAD using 3.0 x 23 mm Xience YOBANY for an acute anterior MI  2. EF 50% by echo  2. Hypertension  3. Hyperlipidemia  4. Asthma  5. Anxiety/depression/panic attacks      Plan:    Continue Brilinta.  No aspirin.  Patient has anaphylaxis with aspirin.  Begin Lipitor 40 mg daily  Continue metoprolol and losartan for hypertension   To telemetry  May shower daily  Anticipate discharge tomorrow      Jeane Goncalves MD  08/21/22  08:43 EDT

## 2022-08-22 ENCOUNTER — READMISSION MANAGEMENT (OUTPATIENT)
Dept: CALL CENTER | Facility: HOSPITAL | Age: 47
End: 2022-08-22

## 2022-08-22 ENCOUNTER — NURSE TRIAGE (OUTPATIENT)
Dept: CALL CENTER | Facility: HOSPITAL | Age: 47
End: 2022-08-22

## 2022-08-22 VITALS
TEMPERATURE: 98.4 F | WEIGHT: 175.93 LBS | HEART RATE: 74 BPM | SYSTOLIC BLOOD PRESSURE: 103 MMHG | HEIGHT: 62 IN | RESPIRATION RATE: 16 BRPM | BODY MASS INDEX: 32.37 KG/M2 | OXYGEN SATURATION: 93 % | DIASTOLIC BLOOD PRESSURE: 75 MMHG

## 2022-08-22 LAB
ANION GAP SERPL CALCULATED.3IONS-SCNC: 17 MMOL/L (ref 5–15)
BASOPHILS # BLD AUTO: 0.06 10*3/MM3 (ref 0–0.2)
BASOPHILS NFR BLD AUTO: 0.6 % (ref 0–1.5)
BUN SERPL-MCNC: 16 MG/DL (ref 6–20)
BUN/CREAT SERPL: 18.4 (ref 7–25)
CALCIUM SPEC-SCNC: 9.8 MG/DL (ref 8.6–10.5)
CHLORIDE SERPL-SCNC: 98 MMOL/L (ref 98–107)
CO2 SERPL-SCNC: 18 MMOL/L (ref 22–29)
CREAT SERPL-MCNC: 0.87 MG/DL (ref 0.57–1)
DEPRECATED RDW RBC AUTO: 44.8 FL (ref 37–54)
EGFRCR SERPLBLD CKD-EPI 2021: 81.8 ML/MIN/1.73
EOSINOPHIL # BLD AUTO: 0.23 10*3/MM3 (ref 0–0.4)
EOSINOPHIL NFR BLD AUTO: 2.2 % (ref 0.3–6.2)
ERYTHROCYTE [DISTWIDTH] IN BLOOD BY AUTOMATED COUNT: 14 % (ref 12.3–15.4)
GLUCOSE BLDC GLUCOMTR-MCNC: 114 MG/DL (ref 70–130)
GLUCOSE BLDC GLUCOMTR-MCNC: 114 MG/DL (ref 70–130)
GLUCOSE BLDC GLUCOMTR-MCNC: 135 MG/DL (ref 70–130)
GLUCOSE SERPL-MCNC: 126 MG/DL (ref 65–99)
HCT VFR BLD AUTO: 52.9 % (ref 34–46.6)
HGB BLD-MCNC: 17.8 G/DL (ref 12–15.9)
IMM GRANULOCYTES # BLD AUTO: 0.06 10*3/MM3 (ref 0–0.05)
IMM GRANULOCYTES NFR BLD AUTO: 0.6 % (ref 0–0.5)
LYMPHOCYTES # BLD AUTO: 3.67 10*3/MM3 (ref 0.7–3.1)
LYMPHOCYTES NFR BLD AUTO: 35.9 % (ref 19.6–45.3)
MCH RBC QN AUTO: 30.1 PG (ref 26.6–33)
MCHC RBC AUTO-ENTMCNC: 33.6 G/DL (ref 31.5–35.7)
MCV RBC AUTO: 89.4 FL (ref 79–97)
MONOCYTES # BLD AUTO: 0.77 10*3/MM3 (ref 0.1–0.9)
MONOCYTES NFR BLD AUTO: 7.5 % (ref 5–12)
NEUTROPHILS NFR BLD AUTO: 5.44 10*3/MM3 (ref 1.7–7)
NEUTROPHILS NFR BLD AUTO: 53.2 % (ref 42.7–76)
NRBC BLD AUTO-RTO: 0 /100 WBC (ref 0–0.2)
PLATELET # BLD AUTO: 353 10*3/MM3 (ref 140–450)
PMV BLD AUTO: 9.4 FL (ref 6–12)
POTASSIUM SERPL-SCNC: 4.2 MMOL/L (ref 3.5–5.2)
RBC # BLD AUTO: 5.92 10*6/MM3 (ref 3.77–5.28)
SODIUM SERPL-SCNC: 133 MMOL/L (ref 136–145)
WBC NRBC COR # BLD: 10.23 10*3/MM3 (ref 3.4–10.8)

## 2022-08-22 PROCEDURE — 80048 BASIC METABOLIC PNL TOTAL CA: CPT | Performed by: INTERNAL MEDICINE

## 2022-08-22 PROCEDURE — 99232 SBSQ HOSP IP/OBS MODERATE 35: CPT | Performed by: NURSE PRACTITIONER

## 2022-08-22 PROCEDURE — 25010000002 ENOXAPARIN PER 10 MG: Performed by: INTERNAL MEDICINE

## 2022-08-22 PROCEDURE — 82962 GLUCOSE BLOOD TEST: CPT

## 2022-08-22 PROCEDURE — 99238 HOSP IP/OBS DSCHRG MGMT 30/<: CPT | Performed by: INTERNAL MEDICINE

## 2022-08-22 PROCEDURE — 85025 COMPLETE CBC W/AUTO DIFF WBC: CPT | Performed by: INTERNAL MEDICINE

## 2022-08-22 RX ORDER — ATORVASTATIN CALCIUM 80 MG/1
80 TABLET, FILM COATED ORAL NIGHTLY
Qty: 90 TABLET | Refills: 11 | Status: SHIPPED | OUTPATIENT
Start: 2022-08-22

## 2022-08-22 RX ORDER — CYCLOBENZAPRINE HCL 10 MG
10 TABLET ORAL 3 TIMES DAILY PRN
COMMUNITY

## 2022-08-22 RX ORDER — ALBUTEROL SULFATE 90 UG/1
2 AEROSOL, METERED RESPIRATORY (INHALATION) 2 TIMES DAILY PRN
COMMUNITY

## 2022-08-22 RX ORDER — FLUTICASONE PROPIONATE AND SALMETEROL 100; 50 UG/1; UG/1
1 POWDER RESPIRATORY (INHALATION) AS NEEDED
COMMUNITY

## 2022-08-22 RX ADMIN — HYDROCODONE BITARTRATE AND ACETAMINOPHEN 1 TABLET: 7.5; 325 TABLET ORAL at 08:45

## 2022-08-22 RX ADMIN — METOPROLOL SUCCINATE 100 MG: 100 TABLET, FILM COATED, EXTENDED RELEASE ORAL at 08:44

## 2022-08-22 RX ADMIN — ENOXAPARIN SODIUM 40 MG: 40 INJECTION SUBCUTANEOUS at 08:45

## 2022-08-22 RX ADMIN — PANTOPRAZOLE SODIUM 40 MG: 40 TABLET, DELAYED RELEASE ORAL at 08:45

## 2022-08-22 RX ADMIN — AMLODIPINE BESYLATE 5 MG: 5 TABLET ORAL at 08:44

## 2022-08-22 RX ADMIN — HYDROCHLOROTHIAZIDE: 25 TABLET ORAL at 08:44

## 2022-08-22 RX ADMIN — TICAGRELOR 90 MG: 90 TABLET ORAL at 08:56
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
## 2022-08-22 NOTE — CASE MANAGEMENT/SOCIAL WORK
Discharge Planning Assessment  [facility]     Patient Name: Rebekah Ga  MRN: [id number]  Today's Date: 8/22/2022    Admit Date: 8/19/2022     Discharge Needs Assessment     Row Name 08/22/22 1236       Living Environment    People in Home spouse;child(jay), dependent    Current Living Arrangements home    Primary Care Provided by self    Provides Primary Care For child(jay)    Family Caregiver if Needed friend(s);spouse    Able to Return to Prior Arrangements yes       Transition Planning    Patient/Family Anticipates Transition to home    Transportation Anticipated family or friend will provide       Discharge Needs Assessment    Equipment Currently Used at Home none               Discharge Plan     Row Name 08/22/22 1236       Plan    Plan Home    Patient/Family in Agreement with Plan yes    Plan Comments I met with Mrs. Ga at the bedside. She lives with her  and 2 dependent children in [facility]. She is independent with mobility and activities of daily living. She drives herself when leaving the home and is not current with any home or outpatient services. She has no medical equipment at home and denies any difficulty in obtaining or affording her medications. She anticipates going home at the time of discharge and that family or friends will transport her home at that time. No discharge needs identified at this time. Case management will continue to follow.    Final Discharge Disposition Code 01 - home or self-care              Continued Care and Services - Admitted Since 8/19/2022    Coordination has not been started for this encounter.       Expected Discharge Date and Time     Expected Discharge Date Expected Discharge Time    Aug 22, 2022          Demographic Summary     Row Name 08/22/22 1235       General Information    General Information Comments Confirmed Jozef Cummings is PCP and [employer] is insurer.               Functional Status     Row Name 08/22/22 1236       Functional Status,  IADL    Medications independent    Meal Preparation independent    Housekeeping independent    Laundry independent    Shopping independent       Employment/    Employment Status employed full-time               Psychosocial    No documentation.                Abuse/Neglect    No documentation.                Legal    No documentation.                Substance Abuse    No documentation.                Patient Forms    No documentation.                   Colt Tamayo RN

## 2022-08-22 NOTE — PROGRESS NOTES
Pt. Referred for Phase II Cardiac Rehab. Staff discussed benefits of exercise, program protocol, and educational material provided. Teach back verified.  Permission granted from patient for staff to fax referral information to outlying program at this time.  Staff faxed referral info to [place].

## 2022-08-22 NOTE — PLAN OF CARE
Goal Outcome Evaluation:   Pt alert and oriented x4, vs stable, and on room air. Pt expected to discharge home. Will continue to monitor. No complaints of pain.

## 2022-08-22 NOTE — PROGRESS NOTES
"[facility] Cardiology Daily Note       LOS: 3 days   Patient Care Team:  Provider, No Known as PCP - General    Chief Complaint: Acute anterior MI    Subjective     Subjective: No complaints.  Wants to go home.  Agreeable to cardiac rehab.  She is seen nutrition as well as the case management people.    Review of Systems:   As above.    Medications:  amLODIPine, 5 mg, Oral, Q24H  atorvastatin, 80 mg, Oral, Nightly  enoxaparin, 40 mg, Subcutaneous, Daily  insulin lispro, 0-9 Units, Subcutaneous, 4x Daily With Meals & Nightly  losartan-HCTZ (HYZAAR) 100-25 combo dose, , Oral, Daily  metoprolol succinate XL, 100 mg, Oral, Daily  pantoprazole, 40 mg, Oral, Daily  pharmacy consult - MTM, , Does not apply, Daily  ticagrelor, 90 mg, Oral, BID        Objective     Vital Sign Min/Max for last 24 hours  Temp  Min: 98 °F (36.7 °C)  Max: 98.3 °F (36.8 °C)   BP  Min: 103/76  Max: 137/74   Pulse  Min: 64  Max: 87   Resp  Min: 14  Max: 18   SpO2  Min: 93 %  Max: 97 %   No data recorded   No data recorded      Intake/Output Summary (Last 24 hours) at 8/22/2022 0948  Last data filed at 8/21/2022 1600  Gross per 24 hour   Intake 840 ml   Output 1950 ml   Net -1110 ml        Flowsheet Rows    Flowsheet Row First Filed Value   Admission Height 157.5 cm (62\") Documented at 08/19/2022 2124   Admission Weight 79.8 kg (176 lb) Documented at 08/19/2022 2124          Physical Exam:    General: Alert and oriented   Cardiovascular: Heart has a nondisplaced focal PMI. Regular rate and rhythm without murmur, gallop or rub.  Lungs: Clear without rales or wheezes. Equal expansion is noted.   Extremities: Show no edema.  Tiny pea-sized hematoma right femoral artery.  Skin: warm and dry.  Neurologic: nonfocal       Results Review:    I reviewed the patient's new clinical results.  EKG:  Tele: Sinus rhythm    Labs:    Results from last 7 days   Lab Units 08/22/22  0548 08/21/22  0438 08/20/22  0323 08/19/22  2319 08/19/22  2116 "   SODIUM mmol/L 133* 137 139 139 142   POTASSIUM mmol/L 4.2 4.4 3.4* 3.7 3.9   CHLORIDE mmol/L 98 103 107 106 106   CO2 mmol/L 18.0* 22.0 22.0 21.0* 23.0   BUN mg/dL 16 13 13 14 16   CREATININE mg/dL 0.87 1.03* 0.65 0.71 0.94   CALCIUM mg/dL 9.8 9.6 8.7 8.6 9.5   BILIRUBIN mg/dL  --   --   --  0.9 0.6   ALK PHOS U/L  --   --   --  96 103   ALT (SGPT) U/L  --   --   --  27 27   AST (SGOT) U/L  --   --   --  36* 21   GLUCOSE mg/dL 126* 135* 169* 160* 163*     Results from last 7 days   Lab Units 08/22/22  0548 08/21/22  0438 08/20/22  0323 08/19/22  2319 08/19/22  2116   WBC 10*3/mm3 10.23 10.59 13.99*   < > 12.23*   HEMOGLOBIN g/dL 17.8* 17.3* 14.4   < > 16.5*   HEMATOCRIT % 52.9* 51.2* 41.7   < > 49.2*   PLATELETS 10*3/mm3 353 386 338   < > 398   MONOCYTES % %  --   --   --   --  3.0*    < > = values in this interval not displayed.     Lab Results   Component Value Date    TROPONINT 0.309 (C) 08/21/2022    TROPONINT 1.050 (C) 08/19/2022    TROPONINT <0.010 08/19/2022     Lab Results   Component Value Date    CHOL 300 (H) 08/19/2022    CHOL 202 (H) 06/06/2022     Lab Results   Component Value Date    TRIG 57 08/19/2022    TRIG 86 06/06/2022    TRIG 130 06/05/2022     Lab Results   Component Value Date    HDL 81 (H) 08/19/2022    HDL 51 06/06/2022     No components found for: LDLCALC  Lab Results   Component Value Date    INR 0.83 (L) 08/19/2022    PROTIME 11.3 (L) 08/19/2022         Ejection Fraction:    Assessment   Assessment:  1. Coronary artery disease   1. status post stenting to the proximal LAD using 3.0 x 23 mm Xience YOBANY for an acute anterior MI  2. EF 50% by echo  2. Hypertension  3. Hyperlipidemia  4. Asthma  5. Anxiety/depression/panic attacks      Plan:    Continue Brilinta 90 mg p.o. twice daily  No aspirin.  The patient has anaphylaxis with aspirin  Lipitor 80 mg daily  Continue metoprolol, Norvasc and losartan for hypertension     Dispo: home today after social work and nutrition consult. F/u with   Sacha in 6 weeks.     Electronically signed by SHORTY Campos, 08/22/22, 9:49 AM EDT.    I have seen and examined the patient, case was discussed with the physician extender, reviewed the above note, necessary changes were made and I agree with the final note.  Jeane Goncalves MD, FACC

## 2022-08-23 LAB
ACT BLD: 393 SECONDS (ref 82–152)
CREAT BLDA-MCNC: 0.7 MG/DL (ref 0.6–1.3)
QT INTERVAL: 350 MS
QT INTERVAL: 426 MS
QTC INTERVAL: 444 MS
QTC INTERVAL: 456 MS

## 2022-08-23 NOTE — OUTREACH NOTE
Prep Survey    Flowsheet Row Responses   [facility] facility patient discharged from? [place]   Is LACE score < 7 ? No   Emergency Room discharge w/ pulse ox? No   Eligibility Readm Mgmt   Discharge diagnosis ST elevation MI   Does the patient have one of the following disease processes/diagnoses(primary or secondary)? Acute MI (STEMI,NSTEMI)   Does the patient have Home health ordered? No   Is there a DME ordered? No   Prep survey completed? Yes          STEVE SAEED - Registered Nurse

## 2022-08-23 NOTE — TELEPHONE ENCOUNTER
"Caller states had recent Cardiac Stent and was in shower started having Chest Pain, Palpitations and feeing Shortness of breath. Caller advised to call 911 and states the Chest Pain is gone now that she is relaxing. Caller again advised per guideline.         Reason for Disposition  • [1] Chest pain lasts > 5 minutes AND [2] age > 30 AND [3] one or more cardiac risk factors (e.g., diabetes, high blood pressure, high cholesterol, smoker, or strong family history of heart disease)    Additional Information  • Negative: SEVERE difficulty breathing (e.g., struggling for each breath, speaks in single words)  • Negative: Difficult to awaken or acting confused (e.g., disoriented, slurred speech)  • Negative: Shock suspected (e.g., cold/pale/clammy skin, too weak to stand, low BP, rapid pulse)  • Negative: Passed out (i.e., fainted, collapsed and was not responding)  • Negative: [1] Chest pain lasts > 5 minutes AND [2] age > 44    Answer Assessment - Initial Assessment Questions  1. LOCATION: \"Where does it hurt?\"        Gone away now but was with exertion. Middle chest   2. RADIATION: \"Does the pain go anywhere else?\" (e.g., into neck, jaw, arms, back)      Denies   3. ONSET: \"When did the chest pain begin?\" (Minutes, hours or days)        About five minutes ago   4. PATTERN \"Does the pain come and go, or has it been constant since it started?\"  \"Does it get worse with exertion?\"       Got worse with exertion   5. DURATION: \"How long does it last\" (e.g., seconds, minutes, hours)        Five minutes or less   6. SEVERITY: \"How bad is the pain?\"  (e.g., Scale 1-10; mild, moderate, or severe)     - MILD (1-3): doesn't interfere with normal activities      - MODERATE (4-7): interferes with normal activities or awakens from sleep     - SEVERE (8-10): excruciating pain, unable to do any normal activities        2  7. CARDIAC RISK FACTORS: \"Do you have any history of heart problems or risk factors for heart disease?\" (e.g., " "angina, prior heart attack; diabetes, high blood pressure, high cholesterol, smoker, or strong family history of heart disease)      Recent Cardiac Stent   8. PULMONARY RISK FACTORS: \"Do you have any history of lung disease?\"  (e.g., blood clots in lung, asthma, emphysema, birth control pills)      Asthma has inhaler   9. CAUSE: \"What do you think is causing the chest pain?\"      Worse with exertion  10. OTHER SYMPTOMS: \"Do you have any other symptoms?\" (e.g., dizziness, nausea, vomiting, sweating, fever, difficulty breathing, cough)        Shortness of breath, Feeling heart beat she states   11. PREGNANCY: \"Is there any chance you are pregnant?\" \"When was your last menstrual period?\"    Protocols used: CHEST PAIN-ADULT-AH      "

## 2022-08-25 ENCOUNTER — READMISSION MANAGEMENT (OUTPATIENT)
Dept: CALL CENTER | Facility: HOSPITAL | Age: 47
End: 2022-08-25

## 2022-08-25 NOTE — OUTREACH NOTE
AMI Week 1 Survey    Flowsheet Row Responses   [facility] patient discharged from? [place]   Does the patient have one of the following disease processes/diagnoses(primary or secondary)? Acute MI (STEMI,NSTEMI)   Week 1 attempt successful? Yes   Call start time 1049   Call end time 1051   Discharge diagnosis ST elevation MI   Person spoke with today (if not patient) and relationship Jozef-spouse   Meds reviewed with patient/caregiver? Yes   Medication comments spouse was unsure regarding pt's medications/RX's   Does the patient have a primary care provider?  Yes   Does the patient have an appointment with their PCP,cardiologist,or clinic within 7 days of discharge? Yes   Comments regarding PCP 8/26/22 @3:50pm   Has the patient kept scheduled appointments due by today? N/A   Has home health visited the patient within 72 hours of discharge? N/A   Psychosocial issues? No   What is the patient's perception of their health status since discharge? Same   Is the patient/caregiver able to teach back signs and symptoms of when to call for help immediately: Sudden chest discomfort, Sudden discomfort in arms, back, neck or jaw, Dizziness or lightheadedness, Irregular or rapid heart rate   Nursing interventions Nurse provided patient education   Is the patient/caregiver able to teach back the hierarchy of who to call/visit for symptoms/problems? PCP, Specialist, Home health nurse, Urgent Care, ED, 911 Yes   Week 1 call completed? Yes   Wrap up additional comments Spoke with spouse that reports the pt still has some chest pressure, was not sure specifics regarding meds. Stated should talk to pt but pt was resting at time of call.          ALEX MARIE - Registered Nurse

## 2022-08-29 NOTE — DISCHARGE SUMMARY
Physician Discharge Summary     Patient ID:  Rebekah Ga  [id number]  47 y.o.  1975    Admit date: 8/19/2022    Discharge date and time: 8/22/2022  5:22 PM     Admitting Physician: Jeane Goncalves MD     Primary Physician: Jozef Cummings MD    Discharge Physician: SHORTY Campos    Admission Diagnoses: ST elevation myocardial infarction (STEMI), unspecified artery (HCC) [I21.3]    Discharge Diagnoses:   Coronary artery disease  S/p anterolateral and inferior STEMI, 8/19/2022  C 8/19/22: 100% occluded proximal LAD, s/p YOBANY. Non obstructive disease noted in RCA and LCx.   Echo 8/20/22: EF 50%. No sig VHD.  Hypertension  Hyperlipidemia  Asthma  Anxiety  Depression    Hospital Course:   Ms. Ga is a 46 y/o female with the above noted past medical history who presented to [facility] on 8/19/22 with complaints of chest pain. Chest pain began on 8/19/22 at around 20:00. EKG revealed ST elevation in anterolateral and inferior leads. She was taken urgently to the cath lab where she was noted to have an occluded proximal LAD. This was treated with PTCA/stent placement with a YOBANY. She was noted to have non critical disease in the circumflex and RCA. She was loaded with Brilinta. Aspirin was noted started due to history of anaphylaxis. An echocardiogram revealed a low normal EF with severe distal anteroseptal and apical hypokinesis.     Discharge Exam:    Vitals:    08/22/22 1600   BP:    Pulse: 74   Resp:    Temp:    SpO2: 93%         General: Alert and oriented   Cardiovascular: Heart has a nondisplaced focal PMI. Regular rate and rhythm without murmur, gallop or rub.  Lungs: Clear without rales or wheezes. Equal expansion is noted.   Extremities: Show no edema.  Tiny pea-sized hematoma right femoral artery.  Skin: warm and dry.  Neurologic: nonfocal    Disposition: Patient will be discharged home    Patient discharge medications:      Your medication list        START taking these medications         Instructions Last Dose Given Next Dose Due   atorvastatin 80 MG tablet  Commonly known as: LIPITOR      Take 1 tablet by mouth Every Night.       losartan-HCTZ (HYZAAR) 100-25 combo dose  Start taking on: August 23, 2022  Replaces: losartan-hydrochlorothiazide 100-25 MG per tablet      Take 1 dose by mouth Daily for 30 days.       ticagrelor 90 MG tablet tablet  Commonly known as: BRILINTA      Take 1 tablet by mouth 2 (Two) Times a Day.              CONTINUE taking these medications        Instructions Last Dose Given Next Dose Due   Ajovy 225 MG/1.5ML solution prefilled syringe  Generic drug: Fremanezumab-vfrm      Inject 675 mg under the skin into the appropriate area as directed Every 3 (Three) Months.       albuterol sulfate  (90 Base) MCG/ACT inhaler  Commonly known as: PROVENTIL HFA;VENTOLIN HFA;PROAIR HFA      Inhale 2 puffs 2 (Two) Times a Day As Needed for Shortness of Air.       amLODIPine 5 MG tablet  Commonly known as: NORVASC      Take 1 tablet by mouth Daily.       clonazePAM 0.5 MG tablet  Commonly known as: KlonoPIN      Take 1 tablet by mouth Daily As Needed for panic       cyclobenzaprine 10 MG tablet  Commonly known as: FLEXERIL      Take 10 mg by mouth 3 (Three) Times a Day As Needed for Muscle Spasms.       Fluticasone-Salmeterol 100-50 MCG/ACT DISKUS  Commonly known as: ADVAIR      Inhale 1 puff 2 (Two) Times a Day.       methocarbamol 750 MG tablet  Commonly known as: ROBAXIN      Take one tablet, by mouth, every 8 hours, as needed, for muscle spasm/tightness.       metoprolol succinate  MG 24 hr tablet  Commonly known as: TOPROL-XL      Take 1 tablet by mouth Daily.       omeprazole 20 MG capsule  Commonly known as: priLOSEC      Take 20 mg by mouth 2 (Two) Times a Day. Patient frequently only takes once daily       ubrogepant 100 MG tablet  Generic drug: ubrogepant      Take one as needed for headache, may repeat once in 2 hours, max 200 mg in 24 hours.       Vitamin D3  1.25 MG (75699 UT) capsule      Take 1 capsule by mouth 1 (One) Time Per Week.       Vyvanse 50 MG capsule  Generic drug: lisdexamfetamine      Take 1 capsule by mouth Every Morning              STOP taking these medications      losartan-hydrochlorothiazide 100-25 MG per tablet  Commonly known as: HYZAAR  Replaced by: losartan-HCTZ (HYZAAR) 100-25 combo dose                  Where to Get Your Medications        These medications were sent to [address] [facility] - [phone]  - [phone]   [facility] [address]      Phone: [phone]   atorvastatin 80 MG tablet  ticagrelor 90 MG tablet tablet       Information about where to get these medications is not yet available    Ask your nurse or doctor about these medications  losartan-HCTZ (HYZAAR) 100-25 combo dose         Referenced discharge instructions provided by nursing for diet and activity.    Follow Up: 4-6 weeks.     Signed:  Brooklyn Chowdhury, SHORTY  8/29/2022  12:38 EDT      Jeane Goncalves MD, FACC

## 2022-08-30 ENCOUNTER — TELEPHONE (OUTPATIENT)
Dept: CARDIOLOGY | Facility: CLINIC | Age: 47
End: 2022-08-30

## 2022-08-30 NOTE — TELEPHONE ENCOUNTER
Pt was told she could return to work on light duty for the first two weeks. Pt needs a note to return to work with specification on what light duty means? She is a .     She also notes feeling like she needs to catch her breath while laying down for bed, expresses this happens while propped up as well, just not as often. She takes 3 to 4 deeps breaths and feeling goes away. Denies any SOB with exertion or with regular activity. Advised this sounds more like anxiety, pt agrees and states she has been experiencing more anxiety since STEMI 8/19. She will monitor breathing and let us know if anything changes. Reports some dizziness but isn't checking BP or HR at home. She states dizziness is only when she gets up too fast. She is drinking 3 to 4 32 oz bottles of water per day. Instructed to practice slowly going from sitting to standing. She is going to start monitoring blood pressure and HR once daily an hour or 2 after medications and will let us know if BP average is consistently greater than 140/90 or SBP staying in the 90's.

## 2022-08-31 ENCOUNTER — READMISSION MANAGEMENT (OUTPATIENT)
Dept: CALL CENTER | Facility: HOSPITAL | Age: 47
End: 2022-08-31

## 2022-08-31 NOTE — OUTREACH NOTE
AMI Week 2 Survey    Flowsheet Row Responses   [facility] patient discharged from? [facility]   Does the patient have one of the following disease processes/diagnoses(primary or secondary)? Acute MI (STEMI,NSTEMI)   Week 2 attempt successful? Yes   Call start time 1441   Call end time 1448   Discharge diagnosis ST elevation MI   Medication alerts for this patient BRILINTA   Meds reviewed with patient/caregiver? Yes   Is the patient having any side effects they believe may be caused by any medication additions or changes? No   Does the patient have all prescriptions related to this admission filled (includes statins,anticoagulants,HTN meds,anti-arrhythmia meds) Yes   Is the patient taking all medications as directed (includes completed medication regime)? Yes   Comments regarding appointments Patient has f/u with Dr. Goncalves and is awaiting a call regarding work restrictions and questions with medical concerns   Does the patient have a primary care provider?  Yes   Does the patient have an appointment with their PCP,cardiologist,or clinic within 7 days of discharge? Yes   Comments regarding PCP 8/26/22 @3:50pm--compliant with f/u   Has the patient kept scheduled appointments due by today? N/A   Has home health visited the patient within 72 hours of discharge? N/A   Psychosocial issues? No   Did the patient receive a copy of their discharge instructions? Yes   What is the patient's perception of their health status since discharge? Improving  [Reports she called cardiology r/t concerns w/breathlessness when she lays down but not w/activity, no edema noted.  She discussed r/t anxiety--this Rn  discussed that she was correct w/ notifying cardiology of concerns w/ s/s.]   Nursing interventions Nurse provided patient education   Is the patient/caregiver able to teach back signs and symptoms of when to call for help immediately: Nausea or vomiting, Irregular or rapid heart rate, Shortness of breath at any time,  Sudden chest discomfort, Sudden sweating or clammy skin, Dizziness or lightheadedness, Sudden discomfort in arms, back, neck or jaw  [Pt reports she understands her heart attack s/s---reviewed other s/s]   Nursing interventions Nurse provided patient education   Is the pateint /caregiver able to teach back the importance of cardiac rehab? Yes   Nursing interventions Referral made to cardiac rehab  [Pt starts rehab today]   Is the patient/caregiver able to teach back lifestyle changes to help prevent MIs Reducing stress, Heart healthy diet, Regular exercise as approved by provider   Is the patient/caregiver able to teach back ways to prevent a second heart attack: Take medications, Participate in Cardiac Rehab, Follow up with MD, Manage risk factors   Is the patient/caregiver able to teach back the hierarchy of who to call/visit for symptoms/problems? PCP, Specialist, Home health nurse, Urgent Care, ED, 911 Yes   Week 2 call completed? Yes          ANDREW MCCOY - Registered Nurse

## 2022-09-07 ENCOUNTER — READMISSION MANAGEMENT (OUTPATIENT)
Dept: CALL CENTER | Facility: HOSPITAL | Age: 47
End: 2022-09-07

## 2022-09-07 NOTE — OUTREACH NOTE
AMI Week 3 Survey    Flowsheet Row Responses   [facility] facility patient discharged from? [facility]   Does the patient have one of the following disease processes/diagnoses(primary or secondary)? Acute MI (STEMI,NSTEMI)   Week 3 attempt successful? No   Unsuccessful attempts Attempt 1          AMY VALENCIA - Registered Nurse

## 2022-09-09 ENCOUNTER — READMISSION MANAGEMENT (OUTPATIENT)
Dept: CALL CENTER | Facility: HOSPITAL | Age: 47
End: 2022-09-09

## 2022-09-09 NOTE — OUTREACH NOTE
AMI Week 3 Survey    Flowsheet Row Responses   [facility] facility patient discharged from? [place] [facility]   Does the patient have one of the following disease processes/diagnoses(primary or secondary)? Acute MI (STEMI,NSTEMI)   Week 3 attempt successful? No   Unsuccessful attempts Attempt 2          KAITLYNN LEONARD - Registered Nurse

## 2022-09-13 ENCOUNTER — TELEPHONE (OUTPATIENT)
Dept: CARDIOLOGY | Facility: CLINIC | Age: 47
End: 2022-09-13

## 2022-09-13 RX ORDER — PRASUGREL 10 MG/1
10 TABLET, FILM COATED ORAL DAILY
Qty: 30 TABLET | Refills: 2 | Status: SHIPPED | OUTPATIENT
Start: 2022-09-13 | End: 2022-12-16

## 2022-09-13 NOTE — TELEPHONE ENCOUNTER
PA for Brilinta was denied. [facility] would like to send in Effient instead. Called to inform patient, no answer, LVM.

## 2022-10-12 ENCOUNTER — OFFICE VISIT (OUTPATIENT)
Dept: CARDIOLOGY | Facility: CLINIC | Age: 47
End: 2022-10-12

## 2022-10-12 VITALS
BODY MASS INDEX: 32.35 KG/M2 | HEART RATE: 73 BPM | HEIGHT: 62 IN | DIASTOLIC BLOOD PRESSURE: 74 MMHG | OXYGEN SATURATION: 96 % | WEIGHT: 175.8 LBS | SYSTOLIC BLOOD PRESSURE: 122 MMHG

## 2022-10-12 DIAGNOSIS — I10 PRIMARY HYPERTENSION: ICD-10-CM

## 2022-10-12 DIAGNOSIS — R06.02 SOB (SHORTNESS OF BREATH): ICD-10-CM

## 2022-10-12 DIAGNOSIS — I25.10 CORONARY ARTERY DISEASE INVOLVING NATIVE CORONARY ARTERY OF NATIVE HEART, UNSPECIFIED WHETHER ANGINA PRESENT: ICD-10-CM

## 2022-10-12 DIAGNOSIS — I21.3 ST ELEVATION MYOCARDIAL INFARCTION (STEMI), UNSPECIFIED ARTERY: Primary | ICD-10-CM

## 2022-10-12 DIAGNOSIS — E78.2 MIXED HYPERLIPIDEMIA: ICD-10-CM

## 2022-10-12 PROCEDURE — 99214 OFFICE O/P EST MOD 30 MIN: CPT | Performed by: INTERNAL MEDICINE

## 2022-10-12 RX ORDER — TRIAMTERENE AND HYDROCHLOROTHIAZIDE 37.5; 25 MG/1; MG/1
1 TABLET ORAL DAILY
Qty: 30 TABLET | Refills: 11 | Status: CANCELLED | OUTPATIENT
Start: 2022-10-12

## 2022-10-12 RX ORDER — LOSARTAN POTASSIUM AND HYDROCHLOROTHIAZIDE 25; 100 MG/1; MG/1
TABLET ORAL DAILY
COMMUNITY
Start: 2022-08-23 | End: 2023-03-24 | Stop reason: ALTCHOICE

## 2022-10-12 RX ORDER — PANCRELIPASE 36000; 180000; 114000 [USP'U]/1; [USP'U]/1; [USP'U]/1
CAPSULE, DELAYED RELEASE PELLETS ORAL
COMMUNITY
Start: 2022-08-31

## 2022-10-12 RX ORDER — BISOPROLOL FUMARATE 10 MG/1
10 TABLET, FILM COATED ORAL DAILY
Qty: 30 TABLET | Refills: 11 | Status: SHIPPED | OUTPATIENT
Start: 2022-10-12

## 2022-11-08 ENCOUNTER — HOSPITAL ENCOUNTER (OUTPATIENT)
Dept: CARDIOLOGY | Facility: HOSPITAL | Age: 47
Discharge: HOME | End: 2022-11-08
Admitting: INTERNAL MEDICINE

## 2022-11-08 VITALS
WEIGHT: 175 LBS | HEIGHT: 62 IN | SYSTOLIC BLOOD PRESSURE: 149 MMHG | DIASTOLIC BLOOD PRESSURE: 86 MMHG | BODY MASS INDEX: 32.2 KG/M2

## 2022-11-08 DIAGNOSIS — R06.02 SOB (SHORTNESS OF BREATH): ICD-10-CM

## 2022-11-08 LAB
BH CV ECHO MEAS - AO MAX PG: 5.3 MMHG
BH CV ECHO MEAS - AO MEAN PG: 2.8 MMHG
BH CV ECHO MEAS - AO ROOT DIAM: 3.2 CM
BH CV ECHO MEAS - AO V2 MAX: 114.8 CM/SEC
BH CV ECHO MEAS - AO V2 VTI: 26.4 CM
BH CV ECHO MEAS - AVA(I,D): 2.06 CM2
BH CV ECHO MEAS - EDV(CUBED): 60.6 ML
BH CV ECHO MEAS - EDV(MOD-SP2): 59 ML
BH CV ECHO MEAS - EDV(MOD-SP4): 51 ML
BH CV ECHO MEAS - EF(MOD-BP): 65 %
BH CV ECHO MEAS - EF(MOD-SP2): 66.1 %
BH CV ECHO MEAS - EF(MOD-SP4): 64.7 %
BH CV ECHO MEAS - ESV(CUBED): 8.8 ML
BH CV ECHO MEAS - ESV(MOD-SP2): 20 ML
BH CV ECHO MEAS - ESV(MOD-SP4): 18 ML
BH CV ECHO MEAS - FS: 47.5 %
BH CV ECHO MEAS - IVS/LVPW: 0.99 CM
BH CV ECHO MEAS - IVSD: 1.04 CM
BH CV ECHO MEAS - LA DIMENSION: 3 CM
BH CV ECHO MEAS - LV DIASTOLIC VOL/BSA (35-75): 28.2 CM2
BH CV ECHO MEAS - LV MASS(C)D: 131.1 GRAMS
BH CV ECHO MEAS - LV MAX PG: 3 MMHG
BH CV ECHO MEAS - LV MEAN PG: 1.46 MMHG
BH CV ECHO MEAS - LV SYSTOLIC VOL/BSA (12-30): 10 CM2
BH CV ECHO MEAS - LV V1 MAX: 86.6 CM/SEC
BH CV ECHO MEAS - LV V1 VTI: 18.2 CM
BH CV ECHO MEAS - LVIDD: 3.9 CM
BH CV ECHO MEAS - LVIDS: 2.06 CM
BH CV ECHO MEAS - LVOT AREA: 3 CM2
BH CV ECHO MEAS - LVOT DIAM: 1.95 CM
BH CV ECHO MEAS - LVPWD: 1.05 CM
BH CV ECHO MEAS - MR MAX PG: 63.7 MMHG
BH CV ECHO MEAS - MR MAX VEL: 398.9 CM/SEC
BH CV ECHO MEAS - MV A MAX VEL: 79 CM/SEC
BH CV ECHO MEAS - MV DEC SLOPE: 238 CM/SEC2
BH CV ECHO MEAS - MV DEC TIME: 0.31 MSEC
BH CV ECHO MEAS - MV E MAX VEL: 61.2 CM/SEC
BH CV ECHO MEAS - MV E/A: 0.77
BH CV ECHO MEAS - MV P1/2T: 85 MSEC
BH CV ECHO MEAS - MVA(P1/2T): 2.6 CM2
BH CV ECHO MEAS - PA ACC SLOPE: 908.4 CM/SEC2
BH CV ECHO MEAS - PA ACC TIME: 0.09 SEC
BH CV ECHO MEAS - PA PR(ACCEL): 39.4 MMHG
BH CV ECHO MEAS - PA V2 MAX: 83.8 CM/SEC
BH CV ECHO MEAS - RAP SYSTOLE: 3 MMHG
BH CV ECHO MEAS - RVSP: 22 MMHG
BH CV ECHO MEAS - SI(MOD-SP2): 21.6 ML/M2
BH CV ECHO MEAS - SI(MOD-SP4): 18.3 ML/M2
BH CV ECHO MEAS - SV(LVOT): 54.4 ML
BH CV ECHO MEAS - SV(MOD-SP2): 39 ML
BH CV ECHO MEAS - SV(MOD-SP4): 33 ML
BH CV ECHO MEAS - TAPSE (>1.6): 2.3 CM
BH CV ECHO MEAS - TR MAX PG: 11.7 MMHG
BH CV ECHO MEAS - TR MAX VEL: 162.7 CM/SEC
BH CV XLRA - RV BASE: 4.1 CM
BH CV XLRA - RV LENGTH: 7 CM
BH CV XLRA - RV MID: 2.9 CM
LV EF 2D ECHO EST: 60 %
MAXIMAL PREDICTED HEART RATE: 171 BPM
STRESS TARGET HR: 145 BPM

## 2022-11-08 PROCEDURE — 93306 TTE W/DOPPLER COMPLETE: CPT | Performed by: INTERNAL MEDICINE

## 2022-11-08 PROCEDURE — 93306 TTE W/DOPPLER COMPLETE: CPT

## 2022-11-08 NOTE — PROGRESS NOTES
[facility] Cardiology    Patient ID: Rebekah Ga is a 47 y.o. female.  : 1975   Contact: [phone]    Encounter date: 2022    PCP: Jozef Cummings MD      Chief complaint:   Chief Complaint   Patient presents with   • ST elevation myocardial infarction (STEMI), unspecified art       Problem List:  1. Coronary artery disease  a. S/p anterolateral and inferior STEMI, 2022  b. LHC 22: 100% occluded proximal LAD, s/p YOBANY. Non obstructive disease noted in RCA and LCx.   c. Echo 22: EF 50%. No sig VHD.  d. Echo, 2022; EF 60%. Borderline concentric LVH. Mild MR and TR. RVSP 22 mmHg.   2. Hypertension  3. Hyperlipidemia  4. Asthma  5. Anxiety  6. Depression    Allergies   Allergen Reactions   • Aspirin Anaphylaxis     Patient tolerates ibuprofen   • Penicillins Hives and Itching       Current Medications:    Current Outpatient Medications:   •  albuterol sulfate  (90 Base) MCG/ACT inhaler, Inhale 2 puffs 2 (Two) Times a Day As Needed for Shortness of Air., Disp: , Rfl:   •  atorvastatin (LIPITOR) 80 MG tablet, Take 1 tablet by mouth Every Night., Disp: 90 tablet, Rfl: 11  •  bisoprolol (ZEBeta) 10 MG tablet, Take 1 tablet by mouth Daily., Disp: 30 tablet, Rfl: 11  •  Cholecalciferol (Vitamin D3) 1.25 MG (37387 UT) capsule, Take 1 capsule by mouth 1 (One) Time Per Week., Disp: 4 capsule, Rfl: 5  •  clonazePAM (KlonoPIN) 0.5 MG tablet, Take 1 tablet by mouth Daily As Needed for panic, Disp: 15 tablet, Rfl: 0  •  Creon 42029-031630 units capsule delayed-release particles capsule, , Disp: , Rfl:   •  cyclobenzaprine (FLEXERIL) 10 MG tablet, Take 10 mg by mouth 3 (Three) Times a Day As Needed for Muscle Spasms., Disp: , Rfl:   •  FLUoxetine (PROzac) 20 MG capsule, Daily., Disp: , Rfl:   •  Fluticasone-Salmeterol (ADVAIR/WIXELA) 100-50 MCG/ACT DISKUS, Inhale 1 puff 2 (Two) Times a Day., Disp: , Rfl:   •  Fremanezumab-vfrm (Ajovy) 225 MG/1.5ML  "solution prefilled syringe, Inject 675 mg under the skin into the appropriate area as directed Every 3 (Three) Months., Disp: 9 mL, Rfl: 3  •  lisdexamfetamine (Vyvanse) 50 MG capsule, Take 1 capsule by mouth Every Morning, Disp: 30 capsule, Rfl: 0  •  losartan-hydrochlorothiazide (HYZAAR) 100-25 MG per tablet, Daily., Disp: , Rfl:   •  methocarbamol (ROBAXIN) 750 MG tablet, Take one tablet, by mouth, every 8 hours, as needed, for muscle spasm/tightness., Disp: 60 tablet, Rfl: 0  •  omeprazole (priLOSEC) 20 MG capsule, Take 20 mg by mouth 2 (Two) Times a Day. Patient frequently only takes once daily, Disp: , Rfl:   •  prasugrel (Effient) 10 MG tablet, Take 1 tablet by mouth Daily., Disp: 30 tablet, Rfl: 2  •  ubrogepant (ubrogepant) 100 MG tablet, Take one as needed for headache, may repeat once in 2 hours, max 200 mg in 24 hours., Disp: 10 tablet, Rfl: 5    HPI    Rebekah Ga is a 47 y.o. female who presents today for four-week follow up of coronary artery disease and cardiac risk factors. Since last visit, the patient has been doing well overall from a cardiovascular standpoint. Patient maintains a stable activity level by attending cardiac rehab. She states that the bisoprolol does not give her a headache any more but she does occasionally get dizzy. She was told that her blood pressure may be dropping due to exercising \"too hard\". She is still taking samples of Brilinta and will switch to Effient once those are completed. Patient denies chest pain, shortness of breath, orthopnea, palpitations, edema, dizziness, and syncope.      The following portions of the patient's history were reviewed and updated as appropriate: allergies, current medications and problem list.    Pertinent positives as listed in the HPI.  All other systems reviewed are negative.         Vitals:    11/09/22 0925   BP: 112/82   BP Location: Left arm   Patient Position: Sitting   Pulse: 66   SpO2: 96%   Weight: 78.5 kg (173 lb)   Height: " "157.5 cm (62\")       Physical Exam:  General: Alert and oriented.  Neck: Jugular venous pressure is within normal limits. Carotids have normal upstrokes without bruits.   Cardiovascular: Heart has a nondisplaced focal PMI. Regular rate and rhythm. No murmur, gallop or rub.  Lungs: Clear, no rales or wheezes. Equal expansion is noted.   Extremities: Show no edema.  Skin: Warm and dry.  Neurologic: Nonfocal.     Diagnostic Data (reviewed with patient):  Lab Results   Component Value Date    GLUCOSE 126 (H) 08/22/2022    BUN 16 08/22/2022    CREATININE 0.87 08/22/2022    BCR 18.4 08/22/2022     (L) 08/22/2022    K 4.2 08/22/2022    CL 98 08/22/2022    CO2 18.0 (L) 08/22/2022    CALCIUM 9.8 08/22/2022    ALBUMIN 4.00 08/19/2022    ALKPHOS 96 08/19/2022    AST 36 (H) 08/19/2022    ALT 27 08/19/2022     Lab Results   Component Value Date    CHOL 300 (H) 08/19/2022    TRIG 57 08/19/2022    HDL 81 (H) 08/19/2022     (H) 08/19/2022      Lab Results   Component Value Date    WBC 10.23 08/22/2022    RBC 5.92 (H) 08/22/2022    HGB 17.8 (H) 08/22/2022    HCT 52.9 (H) 08/22/2022    MCV 89.4 08/22/2022     08/22/2022      Lab Results   Component Value Date    TSH 2.460 06/06/2022        Procedures      Assessment:    ICD-10-CM ICD-9-CM   1. ST elevation myocardial infarction (STEMI), unspecified artery (HCC)  I21.3 410.90   2. Coronary artery disease involving native coronary artery of native heart, unspecified whether angina present  I25.10 414.01   3. Primary hypertension  I10 401.9   4. Mixed hyperlipidemia  E78.2 272.2         Plan:  1. Discontinue amlodipine 5 mg due to fall in blood pressure w exercise. Routinely monitor your blood pressure at home with a goal for systolic to be <130 mmHg. Please keep a log of these numbers and contact us if numbers are regularly elevated.  2. Continue on bisoprolol 10 mg daily for rate control and hypertension.    3. Continue on atorvastatin 80 mg nightly for " hyperlipidemia.    4. Continue on Hyzaar 100--25 mg daily for hypertension.  She would like to continue the HCTZ because it helps with her fluid retention.  5. Continue on Effient 10 mg for antiplatelet therapy.   6. Continue all other current medications.  7. F/up in 3 months, sooner if needed.      Scribed for Jeane Goncalves MD by Natalee Norris. 11/9/2022 09:30 EST         I Jeane Goncalves MD personally performed the services described in this documentation as scribed by the above individual in my presence, and it is both accurate and complete.    Jeane Goncalves MD, FACC

## 2022-11-09 ENCOUNTER — OFFICE VISIT (OUTPATIENT)
Dept: CARDIOLOGY | Facility: CLINIC | Age: 47
End: 2022-11-09

## 2022-11-09 VITALS
OXYGEN SATURATION: 96 % | HEIGHT: 62 IN | SYSTOLIC BLOOD PRESSURE: 112 MMHG | DIASTOLIC BLOOD PRESSURE: 82 MMHG | HEART RATE: 66 BPM | BODY MASS INDEX: 31.83 KG/M2 | WEIGHT: 173 LBS

## 2022-11-09 DIAGNOSIS — I10 PRIMARY HYPERTENSION: ICD-10-CM

## 2022-11-09 DIAGNOSIS — I25.10 CORONARY ARTERY DISEASE INVOLVING NATIVE CORONARY ARTERY OF NATIVE HEART, UNSPECIFIED WHETHER ANGINA PRESENT: ICD-10-CM

## 2022-11-09 DIAGNOSIS — I21.3 ST ELEVATION MYOCARDIAL INFARCTION (STEMI), UNSPECIFIED ARTERY: Primary | ICD-10-CM

## 2022-11-09 DIAGNOSIS — E78.2 MIXED HYPERLIPIDEMIA: ICD-10-CM

## 2022-11-09 PROCEDURE — 99214 OFFICE O/P EST MOD 30 MIN: CPT | Performed by: INTERNAL MEDICINE

## 2022-11-09 RX ORDER — FLUOXETINE HYDROCHLORIDE 20 MG/1
CAPSULE ORAL DAILY
COMMUNITY
Start: 2022-11-08

## 2022-12-16 RX ORDER — PRASUGREL 10 MG/1
TABLET, FILM COATED ORAL
Qty: 90 TABLET | Refills: 3 | Status: SHIPPED | OUTPATIENT
Start: 2022-12-16

## 2023-01-09 ENCOUNTER — TELEPHONE (OUTPATIENT)
Dept: CARDIOLOGY | Facility: CLINIC | Age: 48
End: 2023-01-09
Payer: COMMERCIAL

## 2023-01-09 RX ORDER — NITROGLYCERIN 0.4 MG/1
TABLET SUBLINGUAL
Qty: 25 TABLET | Refills: 11 | Status: SHIPPED | OUTPATIENT
Start: 2023-01-09

## 2023-03-17 DIAGNOSIS — E78.5 HYPERLIPIDEMIA LDL GOAL <70: Primary | ICD-10-CM

## 2023-03-21 ENCOUNTER — HOSPITAL ENCOUNTER (EMERGENCY)
Facility: HOSPITAL | Age: 48
Discharge: HOME | End: 2023-03-21
Attending: EMERGENCY MEDICINE | Admitting: EMERGENCY MEDICINE
Payer: COMMERCIAL

## 2023-03-21 ENCOUNTER — APPOINTMENT (OUTPATIENT)
Dept: CT IMAGING | Facility: HOSPITAL | Age: 48
End: 2023-03-21
Payer: COMMERCIAL

## 2023-03-21 ENCOUNTER — APPOINTMENT (OUTPATIENT)
Dept: GENERAL RADIOLOGY | Facility: HOSPITAL | Age: 48
End: 2023-03-21
Payer: COMMERCIAL

## 2023-03-21 ENCOUNTER — TELEPHONE (OUTPATIENT)
Dept: CARDIOLOGY | Facility: CLINIC | Age: 48
End: 2023-03-21
Payer: COMMERCIAL

## 2023-03-21 VITALS
RESPIRATION RATE: 16 BRPM | BODY MASS INDEX: 31.28 KG/M2 | HEART RATE: 55 BPM | SYSTOLIC BLOOD PRESSURE: 144 MMHG | DIASTOLIC BLOOD PRESSURE: 78 MMHG | OXYGEN SATURATION: 97 % | TEMPERATURE: 98.8 F | WEIGHT: 170 LBS | HEIGHT: 62 IN

## 2023-03-21 DIAGNOSIS — R42 DIZZINESS: Primary | ICD-10-CM

## 2023-03-21 DIAGNOSIS — R51.9 NONINTRACTABLE HEADACHE, UNSPECIFIED CHRONICITY PATTERN, UNSPECIFIED HEADACHE TYPE: ICD-10-CM

## 2023-03-21 DIAGNOSIS — I10 UNCONTROLLED HYPERTENSION: ICD-10-CM

## 2023-03-21 LAB
ALBUMIN SERPL-MCNC: 4.2 G/DL (ref 3.5–5.2)
ALBUMIN/GLOB SERPL: 1.4 G/DL
ALP SERPL-CCNC: 109 U/L (ref 39–117)
ALT SERPL W P-5'-P-CCNC: 29 U/L (ref 1–33)
ANION GAP SERPL CALCULATED.3IONS-SCNC: 8 MMOL/L (ref 5–15)
AST SERPL-CCNC: 25 U/L (ref 1–32)
BACTERIA UR QL AUTO: NORMAL /HPF
BASOPHILS # BLD AUTO: 0.07 10*3/MM3 (ref 0–0.2)
BASOPHILS NFR BLD AUTO: 0.6 % (ref 0–1.5)
BILIRUB SERPL-MCNC: 0.4 MG/DL (ref 0–1.2)
BILIRUB UR QL STRIP: NEGATIVE
BUN SERPL-MCNC: 16 MG/DL (ref 6–20)
BUN/CREAT SERPL: 16.3 (ref 7–25)
CALCIUM SPEC-SCNC: 9.6 MG/DL (ref 8.6–10.5)
CHLORIDE SERPL-SCNC: 103 MMOL/L (ref 98–107)
CLARITY UR: CLEAR
CO2 SERPL-SCNC: 28 MMOL/L (ref 22–29)
COLOR UR: YELLOW
CREAT SERPL-MCNC: 0.98 MG/DL (ref 0.57–1)
DEPRECATED RDW RBC AUTO: 43.3 FL (ref 37–54)
EGFRCR SERPLBLD CKD-EPI 2021: 70.9 ML/MIN/1.73
EOSINOPHIL # BLD AUTO: 0.27 10*3/MM3 (ref 0–0.4)
EOSINOPHIL NFR BLD AUTO: 2.3 % (ref 0.3–6.2)
ERYTHROCYTE [DISTWIDTH] IN BLOOD BY AUTOMATED COUNT: 13.2 % (ref 12.3–15.4)
FLUAV RNA RESP QL NAA+PROBE: NOT DETECTED
FLUBV RNA RESP QL NAA+PROBE: NOT DETECTED
GLOBULIN UR ELPH-MCNC: 3.1 GM/DL
GLUCOSE SERPL-MCNC: 112 MG/DL (ref 65–99)
GLUCOSE UR STRIP-MCNC: NEGATIVE MG/DL
HCT VFR BLD AUTO: 46.9 % (ref 34–46.6)
HGB BLD-MCNC: 15.5 G/DL (ref 12–15.9)
HGB UR QL STRIP.AUTO: ABNORMAL
HOLD SPECIMEN: NORMAL
HYALINE CASTS UR QL AUTO: NORMAL /LPF
IMM GRANULOCYTES # BLD AUTO: 0.03 10*3/MM3 (ref 0–0.05)
IMM GRANULOCYTES NFR BLD AUTO: 0.3 % (ref 0–0.5)
KETONES UR QL STRIP: NEGATIVE
LEUKOCYTE ESTERASE UR QL STRIP.AUTO: NEGATIVE
LYMPHOCYTES # BLD AUTO: 5.17 10*3/MM3 (ref 0.7–3.1)
LYMPHOCYTES NFR BLD AUTO: 43.2 % (ref 19.6–45.3)
MAGNESIUM SERPL-MCNC: 1.9 MG/DL (ref 1.6–2.6)
MCH RBC QN AUTO: 29.9 PG (ref 26.6–33)
MCHC RBC AUTO-ENTMCNC: 33 G/DL (ref 31.5–35.7)
MCV RBC AUTO: 90.5 FL (ref 79–97)
MONOCYTES # BLD AUTO: 1.13 10*3/MM3 (ref 0.1–0.9)
MONOCYTES NFR BLD AUTO: 9.4 % (ref 5–12)
NEUTROPHILS NFR BLD AUTO: 44.2 % (ref 42.7–76)
NEUTROPHILS NFR BLD AUTO: 5.3 10*3/MM3 (ref 1.7–7)
NITRITE UR QL STRIP: NEGATIVE
NRBC BLD AUTO-RTO: 0 /100 WBC (ref 0–0.2)
PH UR STRIP.AUTO: 6 [PH] (ref 5–8)
PLATELET # BLD AUTO: 409 10*3/MM3 (ref 140–450)
PMV BLD AUTO: 9.5 FL (ref 6–12)
POTASSIUM SERPL-SCNC: 3.7 MMOL/L (ref 3.5–5.2)
PROT SERPL-MCNC: 7.3 G/DL (ref 6–8.5)
PROT UR QL STRIP: NEGATIVE
RBC # BLD AUTO: 5.18 10*6/MM3 (ref 3.77–5.28)
RBC # UR STRIP: NORMAL /HPF
REF LAB TEST METHOD: NORMAL
SARS-COV-2 RNA RESP QL NAA+PROBE: NOT DETECTED
SODIUM SERPL-SCNC: 139 MMOL/L (ref 136–145)
SP GR UR STRIP: 1.01 (ref 1–1.03)
SQUAMOUS #/AREA URNS HPF: NORMAL /HPF
TROPONIN T SERPL HS-MCNC: <6 NG/L
UROBILINOGEN UR QL STRIP: ABNORMAL
WBC # UR STRIP: NORMAL /HPF
WBC NRBC COR # BLD: 11.97 10*3/MM3 (ref 3.4–10.8)
WHOLE BLOOD HOLD COAG: NORMAL
WHOLE BLOOD HOLD SPECIMEN: NORMAL

## 2023-03-21 PROCEDURE — 84484 ASSAY OF TROPONIN QUANT: CPT | Performed by: EMERGENCY MEDICINE

## 2023-03-21 PROCEDURE — 70450 CT HEAD/BRAIN W/O DYE: CPT

## 2023-03-21 PROCEDURE — 81001 URINALYSIS AUTO W/SCOPE: CPT | Performed by: EMERGENCY MEDICINE

## 2023-03-21 PROCEDURE — 85025 COMPLETE CBC W/AUTO DIFF WBC: CPT | Performed by: EMERGENCY MEDICINE

## 2023-03-21 PROCEDURE — 93005 ELECTROCARDIOGRAM TRACING: CPT | Performed by: EMERGENCY MEDICINE

## 2023-03-21 PROCEDURE — 36415 COLL VENOUS BLD VENIPUNCTURE: CPT

## 2023-03-21 PROCEDURE — 99283 EMERGENCY DEPT VISIT LOW MDM: CPT

## 2023-03-21 PROCEDURE — 87636 SARSCOV2 & INF A&B AMP PRB: CPT | Performed by: NURSE PRACTITIONER

## 2023-03-21 PROCEDURE — 71045 X-RAY EXAM CHEST 1 VIEW: CPT

## 2023-03-21 PROCEDURE — 83735 ASSAY OF MAGNESIUM: CPT | Performed by: EMERGENCY MEDICINE

## 2023-03-21 PROCEDURE — 80053 COMPREHEN METABOLIC PANEL: CPT | Performed by: EMERGENCY MEDICINE

## 2023-03-21 RX ORDER — SODIUM CHLORIDE 0.9 % (FLUSH) 0.9 %
10 SYRINGE (ML) INJECTION AS NEEDED
Status: DISCONTINUED | OUTPATIENT
Start: 2023-03-21 | End: 2023-03-21 | Stop reason: HOSPADM

## 2023-03-21 RX ORDER — CLONIDINE HYDROCHLORIDE 0.1 MG/1
0.1 TABLET ORAL ONCE
Status: DISCONTINUED | OUTPATIENT
Start: 2023-03-21 | End: 2023-03-21 | Stop reason: HOSPADM

## 2023-03-21 RX ADMIN — SODIUM CHLORIDE 1000 ML: 9 INJECTION, SOLUTION INTRAVENOUS at 16:05

## 2023-03-21 NOTE — DISCHARGE INSTRUCTIONS
Go home and take your migraine medication.    Follow up with Primary care physician.    Keep appointment with Dr. Goncalves on Friday.     Return to the ER for any change.

## 2023-03-21 NOTE — ED PROVIDER NOTES
EMERGENCY DEPARTMENT ENCOUNTER    Pt Name: Rebekah Ga  MRN: [id number]  Pt :   1975  Room Number:  33/33  Date of encounter:  3/21/2023  PCP: Jozef Cummings MD  ED Provider: SHORTY Black    Historian: patient    HPI:  Chief Complaint: Lightheaded, Headache.     Context: Rebekah Ga is a 47 y.o. female who presents to the ED c/o lightheaded, headache.  PT is a . Pt was walking down a flight of stairs and she felt fine.  When she went to walk up the flight of stair she had sudden onset of a headache and felt lightheaded.  Patient went to the office and had a blood pressure check.  It was 154/84 at this time.  They checked it again it was 146/97.  Patient denied any chest pain she had nausea but no vomiting.  No visual disturbance.  Mild shortness of breath which is not abnormal.  She advises that she did have an MI in 2019.  Patient is followed by Dr. Goncalves.  She has an appointment to see Dr. Goncalves at 2 PM on Friday.  Patient currently denies any chest pain.  She has not had any pain all day.  Patient reports the discomfort in her head is on the right side she denies visual changes.  Patient complains of body aches.  Patient reports fatigue.  Patient denies fevers and chills.  Patient has been around multiple sick kids at school.  HPI     REVIEW OF SYSTEMS  A chief complaint appropriate review of systems was completed and is negative except as noted in the HPI.     PAST MEDICAL HISTORY  Past Medical History:   Diagnosis Date   • ADHD    • Anxiety    • Bipolar affective (HCC)    • Depression    • Hypertension    • Migraine        PAST SURGICAL HISTORY  Past Surgical History:   Procedure Laterality Date   • CARDIAC CATHETERIZATION N/A 2022    Procedure: Left Heart Cath;  Surgeon: Jeane Goncalves MD;  Location: [facility] CATH INVASIVE LOCATION;  Service: Cardiovascular;  Laterality: N/A;       FAMILY HISTORY  Family History   Problem  Relation Age of Onset   • Breast cancer Mother 76   • Ovarian cancer Neg Hx        SOCIAL HISTORY  Social History     Socioeconomic History   • Marital status:    Tobacco Use   • Smoking status: Never   • Smokeless tobacco: Never   Vaping Use   • Vaping Use: Never used   Substance and Sexual Activity   • Alcohol use: No   • Drug use: No   • Sexual activity: Defer       ALLERGIES  Aspirin and Penicillins    PHYSICAL EXAM  Physical Exam  GENERAL:   Appears in no acute distress.   HENT: Nares patent.  EYES: No scleral icterus.  CV: Regular rhythm, regular rate.  RESPIRATORY: Normal effort.  No audible wheezes, rales or rhonchi.  ABDOMEN: Soft, nontender  MUSCULOSKELETAL: No deformities.   NEURO: Alert, moves all extremities, follows commands.  SKIN: Warm, dry, no rash visualized.  PSYCH:   I have reviewed the triage vital signs and nursing notes.    Physical Exam     LAB RESULTS  Results for orders placed or performed during the hospital encounter of 03/21/23   COVID-19 and FLU A/B PCR - Swab, Nasopharynx    Specimen: Nasopharynx; Swab   Result Value Ref Range    COVID19 Not Detected Not Detected - Ref. Range    Influenza A PCR Not Detected Not Detected    Influenza B PCR Not Detected Not Detected   Comprehensive Metabolic Panel    Specimen: Blood   Result Value Ref Range    Glucose 112 (H) 65 - 99 mg/dL    BUN 16 6 - 20 mg/dL    Creatinine 0.98 0.57 - 1.00 mg/dL    Sodium 139 136 - 145 mmol/L    Potassium 3.7 3.5 - 5.2 mmol/L    Chloride 103 98 - 107 mmol/L    CO2 28.0 22.0 - 29.0 mmol/L    Calcium 9.6 8.6 - 10.5 mg/dL    Total Protein 7.3 6.0 - 8.5 g/dL    Albumin 4.2 3.5 - 5.2 g/dL    ALT (SGPT) 29 1 - 33 U/L    AST (SGOT) 25 1 - 32 U/L    Alkaline Phosphatase 109 39 - 117 U/L    Total Bilirubin 0.4 0.0 - 1.2 mg/dL    Globulin 3.1 gm/dL    A/G Ratio 1.4 g/dL    BUN/Creatinine Ratio 16.3 7.0 - 25.0    Anion Gap 8.0 5.0 - 15.0 mmol/L    eGFR 70.9 >60.0 mL/min/1.73   Single High Sensitivity Troponin T     Specimen: Blood   Result Value Ref Range    HS Troponin T <6 <10 ng/L   Magnesium    Specimen: Blood   Result Value Ref Range    Magnesium 1.9 1.6 - 2.6 mg/dL   Urinalysis With Microscopic If Indicated (No Culture) - Urine, Clean Catch    Specimen: Urine, Clean Catch   Result Value Ref Range    Color, UA Yellow Yellow, Straw    Appearance, UA Clear Clear    pH, UA 6.0 5.0 - 8.0    Specific Gravity, UA 1.011 1.001 - 1.030    Glucose, UA Negative Negative    Ketones, UA Negative Negative    Bilirubin, UA Negative Negative    Blood, UA Small (1+) (A) Negative    Protein, UA Negative Negative    Leuk Esterase, UA Negative Negative    Nitrite, UA Negative Negative    Urobilinogen, UA 0.2 E.U./dL 0.2 - 1.0 E.U./dL   CBC Auto Differential    Specimen: Blood   Result Value Ref Range    WBC 11.97 (H) 3.40 - 10.80 10*3/mm3    RBC 5.18 3.77 - 5.28 10*6/mm3    Hemoglobin 15.5 12.0 - 15.9 g/dL    Hematocrit 46.9 (H) 34.0 - 46.6 %    MCV 90.5 79.0 - 97.0 fL    MCH 29.9 26.6 - 33.0 pg    MCHC 33.0 31.5 - 35.7 g/dL    RDW 13.2 12.3 - 15.4 %    RDW-SD 43.3 37.0 - 54.0 fl    MPV 9.5 6.0 - 12.0 fL    Platelets 409 140 - 450 10*3/mm3    Neutrophil % 44.2 42.7 - 76.0 %    Lymphocyte % 43.2 19.6 - 45.3 %    Monocyte % 9.4 5.0 - 12.0 %    Eosinophil % 2.3 0.3 - 6.2 %    Basophil % 0.6 0.0 - 1.5 %    Immature Grans % 0.3 0.0 - 0.5 %    Neutrophils, Absolute 5.30 1.70 - 7.00 10*3/mm3    Lymphocytes, Absolute 5.17 (H) 0.70 - 3.10 10*3/mm3    Monocytes, Absolute 1.13 (H) 0.10 - 0.90 10*3/mm3    Eosinophils, Absolute 0.27 0.00 - 0.40 10*3/mm3    Basophils, Absolute 0.07 0.00 - 0.20 10*3/mm3    Immature Grans, Absolute 0.03 0.00 - 0.05 10*3/mm3    nRBC 0.0 0.0 - 0.2 /100 WBC   Urinalysis, Microscopic Only - Urine, Clean Catch    Specimen: Urine, Clean Catch   Result Value Ref Range    RBC, UA 0-2 None Seen, 0-2 /HPF    WBC, UA 0-2 None Seen, 0-2 /HPF    Bacteria, UA None Seen None Seen, Trace /HPF    Squamous Epithelial Cells, UA 0-2 None Seen,  0-2 /HPF    Hyaline Casts, UA None Seen 0 - 6 /LPF    Methodology Automated Microscopy    ECG 12 Lead ED Triage Standing Order; Weak / Dizzy / AMS   Result Value Ref Range    QT Interval 436 ms    QTC Interval 431 ms   Green Top (Gel)   Result Value Ref Range    Extra Tube Hold for add-ons.    Lavender Top   Result Value Ref Range    Extra Tube hold for add-on    Gold Top - SST   Result Value Ref Range    Extra Tube Hold for add-ons.    Gray Top   Result Value Ref Range    Extra Tube Hold for add-ons.    Light Blue Top   Result Value Ref Range    Extra Tube Hold for add-ons.        If labs were ordered, I independently reviewed the results and considered them in treating the patient.    RADIOLOGY  CT Head Without Contrast   Final Result   Impression:   Normal study.      Electronically Signed: Donavon Marcum     3/21/2023 5:46 PM EDT     Workstation ID: [id number]      XR Chest 1 View   Final Result   Impression:   No active disease.      Electronically Signed: Donavon Marcum     3/21/2023 2:56 PM EDT     Workstation ID: [id number]        [x] Radiologist's Report Reviewed:  I ordered and independently reviewed the above noted radiographic studies.  See radiologist's dictation for official interpretation.      PROCEDURES    Procedures    ECG 12 Lead ED Triage Standing Order; Weak / Dizzy / AMS   Preliminary Result   Test Reason : ED Triage Standing Order~   Blood Pressure :   */*   mmHG   Vent. Rate :  59 BPM     Atrial Rate :  59 BPM      P-R Int : 184 ms          QRS Dur :  74 ms       QT Int : 436 ms       P-R-T Axes :  54  58  80 degrees      QTc Int : 431 ms      Sinus bradycardia   Otherwise normal ECG   When compared with ECG of 20-AUG-2022 04:14,   Borderline criteria for Inferior infarct are no longer present      Referred By:            Confirmed By:           MEDICATIONS GIVEN IN ER    Medications   sodium chloride 0.9 % bolus 1,000 mL (0 mL Intravenous Stopped 3/21/23 1818)       MEDICAL DECISION MAKING, PROGRESS,  and CONSULTS   Medical Decision Making  Rebekah Ga is a 47 y.o. female who presents to the ED c/o lightheaded, headache.  PT is a . Pt was walking down a flight of stairs and she felt fine.  When she went to walk up the flight of stair she had sudden onset of a headache and felt lightheaded.  Patient went to the office and had a blood pressure check.  It was 154/84 at this time.  They checked it again it was 146/97.  Patient denied any chest pain she had nausea but no vomiting.  No visual disturbance.  Mild shortness of breath which is not abnormal.  She advises that she did have an MI in 2019.  Patient is followed by Dr. Goncalves.  She has an appointment to see Dr. Goncalves at 2 PM on Friday.  Patient currently denies any chest pain.  She has not had any pain all day.  Patient reports the discomfort in her head is on the right side she denies visual changes.  Patient complains of body aches.  Patient reports fatigue.  Patient denies fevers and chills.  Patient has been around multiple sick kids at school.      Dizziness: acute illness or injury  Nonintractable headache, unspecified chronicity pattern, unspecified headache type: acute illness or injury  Uncontrolled hypertension: chronic illness or injury  Amount and/or Complexity of Data Reviewed  Labs: ordered.  Radiology: ordered.  ECG/medicine tests: ordered and independent interpretation performed. Decision-making details documented in ED Course.      Risk  OTC drugs.          All labs have been independently reviewed by me.  All radiology studies have been reviewed by me and the radiologist dictating the report.  All EKG's have been independently viewed by me.    [] Discussed with radiology regarding test interpretation:    Discussion below represents my analysis of pertinent findings related to patient's condition, differential diagnosis, treatment plan and final disposition.    Differential diagnosis:  The differential  diagnosis associated with the patient's presentation includes: Electrolyte imbalance, acute kidney injury, CVA, headache, MI, influenza, COVID, pneumonia.    Additional sources  Discussed/ obtained information from independent historians:   [] Spouse  [] Parent  [] Family member  [] Friend  [] EMS   [] Other:  External (non-ED) record review:   [x] Inpatient record:   [x] Office record:   [x] Outpatient record:   [x] Prior Outpatient labs:   [x] Prior Outpatient radiology:   [] Primary Care record:   [] Outside ED record:   [] Other:   Patient's care impacted by:   [] Diabetes  [x] Hypertension  [] Hyperlipidemia  [] Hypothyroidism   [x] Coronary Artery Disease   [] COPD   [] Cancer   [] Obesity   [] Tobacco Abuse   [] Substance Abuse    [x] Anxiety   [x] Depression   [] Other:   Care significantly affected by Social Determinants of Health (housing and economic circumstances, unemployment)    [] Yes     [x] No   If yes, Patient's care significantly limited by  Social Determinants of Health including:   [] Inadequate housing   [] Low income   [] Alcoholism and drug addiction in family   [] Problems related to primary support group   [] Unemployment   [] Problems related to employment   [] Other Social Determinants of Health:     Shared decision making: Results are discussed with the patient.  Patient has an appointment to see cardiology on Friday.  Patient has denied chest pain all day.  We discussed the results of her work-up.  Patient reports that she is developing a headache at this time.  I was can administer some medication however patient reports she would rather just go home and take her prescribed migraine medicine.  Orders placed during this visit:  Orders Placed This Encounter   Procedures   • COVID PRE-OP / PRE-PROCEDURE SCREENING ORDER (NO ISOLATION) - Swab, Nasopharynx   • COVID-19 and FLU A/B PCR - Swab, Nasopharynx   • XR Chest 1 View   • CT Head Without Contrast   • Mason Draw   • Comprehensive  Metabolic Panel   • Single High Sensitivity Troponin T   • Magnesium   • Urinalysis With Microscopic If Indicated (No Culture) - Urine, Clean Catch   • CBC Auto Differential   • Urinalysis, Microscopic Only - Urine, Clean Catch   • Undress & Gown   • Vital Signs   • Pulse Oximetry, Continuous   • ECG 12 Lead ED Triage Standing Order; Weak / Dizzy / AMS   • CBC & Differential   • Green Top (Gel)   • Lavender Top   • Gold Top - SST   • Gray Top   • Light Blue Top       I considered prescription management  with:   [x] Pain medication  [] Antiviral  [] Antibiotic   [] Other:   Rationale: Pt developed a migraine in the ER.  Pt declines pain meds.   Additional orders considered but not ordered:  The following testing was considered but ultimately not selected after discussion with patient/family: MRI of brain.  Patient did not feel any further work-up was necessary regarding her headache.  Patient's not off balance.  Patient has a history of migraines.  She patient requested to go home so she could take her at home migraine medication.  ED Course:    ED Course as of 03/21/23 2337   Tue Mar 21, 2023   1430 HS Troponin T: <6  Initial troponins negative.  EKGs reviewed sinus bradycardia at a rate of 56 no obvious signs of ischemia, injury or infarct. [KG]   1430 Glucose(!): 112  Glucose is mildly elevated at 112.  Patient is diet controlled. [KG]   1500 Chest x-ray reviewed no acute findings. [KG]   1800 CT of the head reviewed no acute findings. [KG]      ED Course User Index  [KG] Madison De Leon APRN            DIAGNOSIS  Final diagnoses:   Dizziness   Nonintractable headache, unspecified chronicity pattern, unspecified headache type   Uncontrolled hypertension       DISPOSITION    ED Disposition     ED Disposition   Discharge    Condition   Stable    Comment   --             Please note that portions of this document were completed with voice recognition software.        Madison De Leon APRN  03/21/23 0933     05-Jan-2022 15:25

## 2023-03-21 NOTE — TELEPHONE ENCOUNTER
Returned call to pt - pt lm requesting a return call- reports chest pain, TOOTIE arm pain, nausea. 's/90's    When I called the pt back, it went to  but the mailbox is full. Will send a msg instructing her to go to the ER if symptoms persist.

## 2023-03-24 ENCOUNTER — OFFICE VISIT (OUTPATIENT)
Dept: CARDIOLOGY | Facility: CLINIC | Age: 48
End: 2023-03-24
Payer: COMMERCIAL

## 2023-03-24 VITALS
DIASTOLIC BLOOD PRESSURE: 76 MMHG | WEIGHT: 170 LBS | SYSTOLIC BLOOD PRESSURE: 124 MMHG | HEART RATE: 62 BPM | OXYGEN SATURATION: 96 % | BODY MASS INDEX: 31.28 KG/M2 | HEIGHT: 62 IN

## 2023-03-24 DIAGNOSIS — I10 PRIMARY HYPERTENSION: ICD-10-CM

## 2023-03-24 DIAGNOSIS — E78.2 MIXED HYPERLIPIDEMIA: ICD-10-CM

## 2023-03-24 DIAGNOSIS — I25.10 CORONARY ARTERY DISEASE INVOLVING NATIVE CORONARY ARTERY OF NATIVE HEART WITHOUT ANGINA PECTORIS: Primary | ICD-10-CM

## 2023-03-24 LAB
QT INTERVAL: 436 MS
QTC INTERVAL: 431 MS

## 2023-03-24 PROCEDURE — 99214 OFFICE O/P EST MOD 30 MIN: CPT | Performed by: INTERNAL MEDICINE

## 2023-03-24 RX ORDER — LOSARTAN POTASSIUM 100 MG/1
100 TABLET ORAL DAILY
Qty: 90 TABLET | Refills: 3 | Status: SHIPPED | OUTPATIENT
Start: 2023-03-24

## 2023-03-24 NOTE — PROGRESS NOTES
[facility] Cardiology    Patient ID: Rebekah Ga is a 47 y.o. female.  : 1975   Contact: [phone]    Encounter date: 2023    PCP: Jozef Cummings MD      Chief complaint:   Chief Complaint   Patient presents with   • ST elevation myocardial infarction (STEMI), unspecified art       Problem List:  1. Coronary artery disease  a. S/p anterolateral and inferior STEMI, 2022  b. LHC 22: 100% occluded proximal LAD, s/p YOBANY. Non obstructive disease noted in RCA and LCx.   c. Echo 22: EF 50%. No sig VHD.  d. Echo, 2022; EF 60%. Borderline concentric LVH. Mild MR and TR. RVSP 22 mmHg.   2. Hypertension  3. Hyperlipidemia  4. Asthma  5. Anxiety  6. Depression    Allergies   Allergen Reactions   • Aspirin Anaphylaxis     Patient tolerates ibuprofen   • Penicillins Hives and Itching       Current Medications:    Current Outpatient Medications:   •  albuterol sulfate  (90 Base) MCG/ACT inhaler, Inhale 2 puffs 2 (Two) Times a Day As Needed for Shortness of Air., Disp: , Rfl:   •  atorvastatin (LIPITOR) 80 MG tablet, Take 1 tablet by mouth Every Night., Disp: 90 tablet, Rfl: 11  •  bisoprolol (ZEBeta) 10 MG tablet, Take 1 tablet by mouth Daily., Disp: 30 tablet, Rfl: 11  •  Cholecalciferol (Vitamin D3) 1.25 MG (12514 UT) capsule, Take 1 capsule by mouth 1 (One) Time Per Week., Disp: 4 capsule, Rfl: 5  •  clonazePAM (KlonoPIN) 0.5 MG tablet, Take 1 tablet by mouth Daily As Needed for panic, Disp: 15 tablet, Rfl: 0  •  Creon [id number] units capsule delayed-release particles capsule, , Disp: , Rfl:   •  cyclobenzaprine (FLEXERIL) 10 MG tablet, Take 1 tablet by mouth 3 (Three) Times a Day As Needed for Muscle Spasms., Disp: , Rfl:   •  FLUoxetine (PROzac) 20 MG capsule, Daily., Disp: , Rfl:   •  Fluticasone-Salmeterol (ADVAIR/WIXELA) 100-50 MCG/ACT DISKUS, Inhale 1 puff As Needed., Disp: , Rfl:   •  methocarbamol (ROBAXIN) 750 MG tablet, Take  "one tablet, by mouth, every 8 hours, as needed, for muscle spasm/tightness., Disp: 60 tablet, Rfl: 0  •  nitroglycerin (NITROSTAT) 0.4 MG SL tablet, 1 under the tongue as needed for angina, may repeat q5mins for up three doses, Disp: 25 tablet, Rfl: 11  •  omeprazole (priLOSEC) 20 MG capsule, Take 1 capsule by mouth 2 (Two) Times a Day. Patient frequently only takes once daily, Disp: , Rfl:   •  prasugrel (EFFIENT) 10 MG tablet, TAKE ONE TABLET BY MOUTH DAILY, Disp: 90 tablet, Rfl: 3  •  ubrogepant (ubrogepant) 100 MG tablet, Take one as needed for headache, may repeat once in 2 hours, max 200 mg in 24 hours., Disp: 10 tablet, Rfl: 5  •  Fremanezumab-vfrm (Ajovy) 225 MG/1.5ML solution prefilled syringe, Inject 675 mg under the skin into the appropriate area as directed Every 3 (Three) Months. (Patient not taking: Reported on 3/24/2023), Disp: 9 mL, Rfl: 3    HPI    Rebekah Ga is a 47 y.o. female who presents today for a follow up of coronary artery disease and cardiac risk factors. Since last visit, patient was recently in the ED and she was told she was dehydrated. They did multiple tests and labs. She states she does drink a lot of water. She was advised to go to the ED due to chest pain she was having with elevated blood pressure. Patient did have recent lab work completed and her HDL was 106 and LDL was 38. She did just initiate the Noom diet. Patient denies chest pain, shortness of breath, orthopnea, palpitations, edema, dizziness, and syncope.     The following portions of the patient's history were reviewed and updated as appropriate: allergies, current medications and problem list.    Pertinent positives as listed in the HPI.  All other systems reviewed are negative.         Vitals:    03/24/23 1457   BP: 124/76   BP Location: Left arm   Patient Position: Sitting   Cuff Size: Adult   Pulse: 62   SpO2: 96%   Weight: 77.1 kg (170 lb)   Height: 157.5 cm (62\")       Physical Exam:  General: Alert and " oriented.  Neck: Jugular venous pressure is within normal limits. Carotids have normal upstrokes without bruits.   Cardiovascular: Heart has a nondisplaced focal PMI. Regular rate and rhythm. No murmur, gallop or rub.  Lungs: Clear, no rales or wheezes. Equal expansion is noted.   Extremities: Show no edema.  Skin: Warm and dry.  Neurologic: Nonfocal.     Diagnostic Data (reviewed with patient):  Lab Results   Component Value Date    GLUCOSE 112 (H) 03/21/2023    BUN 16 03/21/2023    CREATININE 0.98 03/21/2023    EGFR 70.9 03/21/2023    BCR 16.3 03/21/2023     03/21/2023    K 3.7 03/21/2023     03/21/2023    CO2 28.0 03/21/2023    CALCIUM 9.6 03/21/2023    ALBUMIN 4.2 03/21/2023    ALKPHOS 109 03/21/2023    AST 25 03/21/2023    ALT 29 03/21/2023     Lab Results   Component Value Date    CHOL 300 (H) 08/19/2022    TRIG 57 08/19/2022    HDL 81 (H) 08/19/2022     (H) 08/19/2022      Lab Results   Component Value Date    WBC 11.97 (H) 03/21/2023    RBC 5.18 03/21/2023    HGB 15.5 03/21/2023    HCT 46.9 (H) 03/21/2023    MCV 90.5 03/21/2023     03/21/2023      Lab Results   Component Value Date    TSH 2.460 06/06/2022      Lab date: 03/23/2023  • FLP:  , LDL 38       Procedures      Assessment:    ICD-10-CM ICD-9-CM   1. Coronary artery disease involving native coronary artery of native heart without angina pectoris  I25.10 414.01   2. Primary hypertension  I10 401.9   3. Mixed hyperlipidemia  E78.2 272.2         Plan:  1. Discontinue Hyzaar due to dizziness/headache and possible dehydration and initiate losartan 100 mg daily to control blood pressure. Patient was advised that if she begins to swell, she may contact us and we will initiate HCTZ separately.   2. Continue on bisoprolol 10 mg daily for rate control and hypertension.    3. Continue on atorvastatin 80 mg nightly for hyperlipidemia.    4. Continue on Effient 10 mg for antiplatelet therapy.   5. Continue all other current  medications.  6. F/up in 6 months, sooner if needed.      Scribed for Jeane oGncalves MD by Natalee Norris. 3/24/2023 15:16 EDT         I Jeane Goncalves MD personally performed the services described in this documentation as scribed by the above individual in my presence, and it is both accurate and complete.    Jeane Goncalves MD, FACC

## 2023-06-06 ENCOUNTER — OFFICE VISIT (OUTPATIENT)
Dept: ENDOCRINOLOGY | Facility: CLINIC | Age: 48
End: 2023-06-06
Payer: COMMERCIAL

## 2023-06-06 VITALS
RESPIRATION RATE: 16 BRPM | HEIGHT: 62 IN | BODY MASS INDEX: 32.2 KG/M2 | OXYGEN SATURATION: 94 % | WEIGHT: 175 LBS | HEART RATE: 70 BPM | DIASTOLIC BLOOD PRESSURE: 76 MMHG | SYSTOLIC BLOOD PRESSURE: 138 MMHG

## 2023-06-06 DIAGNOSIS — E04.9 GOITER: Primary | ICD-10-CM

## 2023-06-06 PROBLEM — E55.9 AVITAMINOSIS D: Status: ACTIVE | Noted: 2023-05-23

## 2023-06-06 PROBLEM — G43.719 CHRONIC MIGRAINE WITHOUT AURA, INTRACTABLE, WITHOUT STATUS MIGRAINOSUS: Status: ACTIVE | Noted: 2023-05-23

## 2023-06-06 LAB
T4 FREE SERPL-MCNC: 1.03 NG/DL (ref 0.93–1.7)
TSH SERPL DL<=0.05 MIU/L-ACNC: 2.97 UIU/ML (ref 0.27–4.2)

## 2023-06-06 PROCEDURE — 84439 ASSAY OF FREE THYROXINE: CPT | Performed by: INTERNAL MEDICINE

## 2023-06-06 PROCEDURE — 84443 ASSAY THYROID STIM HORMONE: CPT | Performed by: INTERNAL MEDICINE
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
## 2023-06-06 NOTE — ASSESSMENT & PLAN NOTE
I did a poc/ real time ultrasound in the office today  The overall thyroid is a bit larger than a normal gland. The right lobe has a 3-4 mm hypoechoic lesion centrally.   In the left lobe inferiorly and posteriorly is a 2.3 cm hyperechoic trad 3 nodule.    This is stable compared to 8.2022    Plan: repeat ultrasound in 1 year  Check tft's today and again in 6 months.  My imprression is that these structural changes are the residual effects from the inflammation caused by Graves disease

## 2023-07-20 ENCOUNTER — HOSPITAL ENCOUNTER (OUTPATIENT)
Facility: HOSPITAL | Age: 48
Discharge: HOME | End: 2023-07-20
Attending: EMERGENCY MEDICINE | Admitting: HOSPITALIST
Payer: COMMERCIAL

## 2023-07-20 ENCOUNTER — APPOINTMENT (OUTPATIENT)
Dept: CARDIOLOGY | Facility: HOSPITAL | Age: 48
End: 2023-07-20
Payer: COMMERCIAL

## 2023-07-20 ENCOUNTER — APPOINTMENT (OUTPATIENT)
Dept: GENERAL RADIOLOGY | Facility: HOSPITAL | Age: 48
End: 2023-07-20
Payer: COMMERCIAL

## 2023-07-20 ENCOUNTER — APPOINTMENT (OUTPATIENT)
Dept: CT IMAGING | Facility: HOSPITAL | Age: 48
End: 2023-07-20
Payer: COMMERCIAL

## 2023-07-20 VITALS
BODY MASS INDEX: 32.76 KG/M2 | HEART RATE: 65 BPM | WEIGHT: 178 LBS | SYSTOLIC BLOOD PRESSURE: 125 MMHG | OXYGEN SATURATION: 96 % | RESPIRATION RATE: 18 BRPM | DIASTOLIC BLOOD PRESSURE: 86 MMHG | HEIGHT: 62 IN | TEMPERATURE: 98 F

## 2023-07-20 DIAGNOSIS — R07.2 PRECORDIAL CHEST PAIN: ICD-10-CM

## 2023-07-20 DIAGNOSIS — I25.118 CORONARY ARTERY DISEASE WITH OTHER FORM OF ANGINA PECTORIS, UNSPECIFIED VESSEL OR LESION TYPE, UNSPECIFIED WHETHER NATIVE OR TRANSPLANTED HEART: ICD-10-CM

## 2023-07-20 DIAGNOSIS — I10 POORLY-CONTROLLED HYPERTENSION: Primary | ICD-10-CM

## 2023-07-20 PROBLEM — I25.10 CAD (CORONARY ARTERY DISEASE): Status: ACTIVE | Noted: 2023-07-20

## 2023-07-20 PROBLEM — D72.829 LEUKOCYTOSIS: Status: ACTIVE | Noted: 2023-07-20

## 2023-07-20 PROBLEM — I16.0 HYPERTENSIVE URGENCY: Status: RESOLVED | Noted: 2023-07-20 | Resolved: 2023-07-20

## 2023-07-20 PROBLEM — I16.0 HYPERTENSIVE URGENCY: Status: ACTIVE | Noted: 2023-07-20

## 2023-07-20 PROBLEM — D72.829 LEUKOCYTOSIS: Status: RESOLVED | Noted: 2023-07-20 | Resolved: 2023-07-20

## 2023-07-20 LAB
ALBUMIN SERPL-MCNC: 3.9 G/DL (ref 3.5–5.2)
ALBUMIN/GLOB SERPL: 1.4 G/DL
ALP SERPL-CCNC: 113 U/L (ref 39–117)
ALT SERPL W P-5'-P-CCNC: 40 U/L (ref 1–33)
ANION GAP SERPL CALCULATED.3IONS-SCNC: 13 MMOL/L (ref 5–15)
AST SERPL-CCNC: 35 U/L (ref 1–32)
BASOPHILS # BLD AUTO: 0.08 10*3/MM3 (ref 0–0.2)
BASOPHILS NFR BLD AUTO: 0.6 % (ref 0–1.5)
BH CV REST NUCLEAR ISOTOPE DOSE: 29.9 MCI
BH CV STRESS BP STAGE 1: NORMAL
BH CV STRESS BP STAGE 3: NORMAL
BH CV STRESS COMMENTS STAGE 1: NORMAL
BH CV STRESS DOSE REGADENOSON STAGE 1: 0.4
BH CV STRESS DURATION MIN STAGE 1: 1
BH CV STRESS DURATION MIN STAGE 2: 1
BH CV STRESS DURATION MIN STAGE 3: 1
BH CV STRESS DURATION MIN STAGE 4: 1
BH CV STRESS DURATION SEC STAGE 2: 0
BH CV STRESS HR STAGE 1: 80
BH CV STRESS HR STAGE 2: 83
BH CV STRESS HR STAGE 3: 80
BH CV STRESS HR STAGE 4: 78
BH CV STRESS NUCLEAR ISOTOPE DOSE: 29.9 MCI
BH CV STRESS O2 STAGE 1: 94
BH CV STRESS O2 STAGE 2: 95
BH CV STRESS O2 STAGE 3: 95
BH CV STRESS O2 STAGE 4: 94
BH CV STRESS PROTOCOL 1: NORMAL
BH CV STRESS RECOVERY BP: NORMAL MMHG
BH CV STRESS RECOVERY HR: 75 BPM
BH CV STRESS RECOVERY O2: 97 %
BH CV STRESS STAGE 1: 1
BH CV STRESS STAGE 2: 2
BH CV STRESS STAGE 3: 3
BH CV STRESS STAGE 4: 4
BILIRUB SERPL-MCNC: 0.4 MG/DL (ref 0–1.2)
BUN SERPL-MCNC: 14 MG/DL (ref 6–20)
BUN/CREAT SERPL: 21.9 (ref 7–25)
CALCIUM SPEC-SCNC: 9 MG/DL (ref 8.6–10.5)
CHLORIDE SERPL-SCNC: 106 MMOL/L (ref 98–107)
CO2 SERPL-SCNC: 21 MMOL/L (ref 22–29)
CREAT SERPL-MCNC: 0.64 MG/DL (ref 0.57–1)
DEPRECATED RDW RBC AUTO: 41.3 FL (ref 37–54)
EGFRCR SERPLBLD CKD-EPI 2021: 107.8 ML/MIN/1.73
EOSINOPHIL # BLD AUTO: 0.28 10*3/MM3 (ref 0–0.4)
EOSINOPHIL NFR BLD AUTO: 2.2 % (ref 0.3–6.2)
ERYTHROCYTE [DISTWIDTH] IN BLOOD BY AUTOMATED COUNT: 12.7 % (ref 12.3–15.4)
GEN 5 2HR TROPONIN T REFLEX: <6 NG/L
GLOBULIN UR ELPH-MCNC: 2.8 GM/DL
GLUCOSE SERPL-MCNC: 131 MG/DL (ref 65–99)
HCT VFR BLD AUTO: 45.4 % (ref 34–46.6)
HGB BLD-MCNC: 15.3 G/DL (ref 12–15.9)
HOLD SPECIMEN: NORMAL
IMM GRANULOCYTES # BLD AUTO: 0.04 10*3/MM3 (ref 0–0.05)
IMM GRANULOCYTES NFR BLD AUTO: 0.3 % (ref 0–0.5)
LIPASE SERPL-CCNC: 51 U/L (ref 13–60)
LYMPHOCYTES # BLD AUTO: 4.92 10*3/MM3 (ref 0.7–3.1)
LYMPHOCYTES NFR BLD AUTO: 38.1 % (ref 19.6–45.3)
MAXIMAL PREDICTED HEART RATE: 170 BPM
MCH RBC QN AUTO: 29.9 PG (ref 26.6–33)
MCHC RBC AUTO-ENTMCNC: 33.7 G/DL (ref 31.5–35.7)
MCV RBC AUTO: 88.7 FL (ref 79–97)
MONOCYTES # BLD AUTO: 1.42 10*3/MM3 (ref 0.1–0.9)
MONOCYTES NFR BLD AUTO: 11 % (ref 5–12)
NEUTROPHILS NFR BLD AUTO: 47.8 % (ref 42.7–76)
NEUTROPHILS NFR BLD AUTO: 6.16 10*3/MM3 (ref 1.7–7)
NRBC BLD AUTO-RTO: 0 /100 WBC (ref 0–0.2)
NT-PROBNP SERPL-MCNC: 154.2 PG/ML (ref 0–900)
PERCENT MAX PREDICTED HR: 48.82 %
PLATELET # BLD AUTO: 379 10*3/MM3 (ref 140–450)
PMV BLD AUTO: 9.4 FL (ref 6–12)
POTASSIUM SERPL-SCNC: 4 MMOL/L (ref 3.5–5.2)
PROT SERPL-MCNC: 6.7 G/DL (ref 6–8.5)
QT INTERVAL: 400 MS
QT INTERVAL: 426 MS
QTC INTERVAL: 440 MS
QTC INTERVAL: 446 MS
RBC # BLD AUTO: 5.12 10*6/MM3 (ref 3.77–5.28)
SODIUM SERPL-SCNC: 140 MMOL/L (ref 136–145)
STRESS BASELINE BP: NORMAL MMHG
STRESS BASELINE HR: 67 BPM
STRESS O2 SAT REST: 96 %
STRESS PERCENT HR: 57 %
STRESS POST ESTIMATED WORKLOAD: 1 METS
STRESS POST EXERCISE DUR MIN: 4 MIN
STRESS POST EXERCISE DUR SEC: 0 SEC
STRESS POST O2 SAT PEAK: 94 %
STRESS POST PEAK BP: NORMAL MMHG
STRESS POST PEAK HR: 83 BPM
STRESS TARGET HR: 145 BPM
TROPONIN T DELTA: NORMAL
TROPONIN T SERPL HS-MCNC: <6 NG/L
WBC NRBC COR # BLD: 12.9 10*3/MM3 (ref 3.4–10.8)
WHOLE BLOOD HOLD SPECIMEN: NORMAL

## 2023-07-20 PROCEDURE — G0378 HOSPITAL OBSERVATION PER HR: HCPCS

## 2023-07-20 PROCEDURE — 36415 COLL VENOUS BLD VENIPUNCTURE: CPT

## 2023-07-20 PROCEDURE — 96366 THER/PROPH/DIAG IV INF ADDON: CPT

## 2023-07-20 PROCEDURE — 96375 TX/PRO/DX INJ NEW DRUG ADDON: CPT

## 2023-07-20 PROCEDURE — 83880 ASSAY OF NATRIURETIC PEPTIDE: CPT

## 2023-07-20 PROCEDURE — 25010000002 HYDROMORPHONE PER 4 MG: Performed by: EMERGENCY MEDICINE

## 2023-07-20 PROCEDURE — 93005 ELECTROCARDIOGRAM TRACING: CPT

## 2023-07-20 PROCEDURE — 25010000002 ONDANSETRON PER 1 MG: Performed by: EMERGENCY MEDICINE

## 2023-07-20 PROCEDURE — 0 NITROGLYCERIN 200 MCG/ML SOLUTION: Performed by: EMERGENCY MEDICINE

## 2023-07-20 PROCEDURE — 99214 OFFICE O/P EST MOD 30 MIN: CPT | Performed by: INTERNAL MEDICINE

## 2023-07-20 PROCEDURE — 85025 COMPLETE CBC W/AUTO DIFF WBC: CPT

## 2023-07-20 PROCEDURE — 0 RUBIDIUM CHLORIDE

## 2023-07-20 PROCEDURE — 25010000002 MORPHINE PER 10 MG: Performed by: EMERGENCY MEDICINE

## 2023-07-20 PROCEDURE — A9555 RB82 RUBIDIUM: HCPCS

## 2023-07-20 PROCEDURE — 93005 ELECTROCARDIOGRAM TRACING: CPT | Performed by: EMERGENCY MEDICINE

## 2023-07-20 PROCEDURE — 71045 X-RAY EXAM CHEST 1 VIEW: CPT

## 2023-07-20 PROCEDURE — 25010000002 REGADENOSON 0.4 MG/5ML SOLUTION

## 2023-07-20 PROCEDURE — 84484 ASSAY OF TROPONIN QUANT: CPT | Performed by: EMERGENCY MEDICINE

## 2023-07-20 PROCEDURE — 78431 MYOCRD IMG PET RST&STRS CT: CPT | Performed by: INTERNAL MEDICINE

## 2023-07-20 PROCEDURE — 96365 THER/PROPH/DIAG IV INF INIT: CPT

## 2023-07-20 PROCEDURE — 78431 MYOCRD IMG PET RST&STRS CT: CPT

## 2023-07-20 PROCEDURE — 96367 TX/PROPH/DG ADDL SEQ IV INF: CPT

## 2023-07-20 PROCEDURE — 80053 COMPREHEN METABOLIC PANEL: CPT | Performed by: EMERGENCY MEDICINE

## 2023-07-20 PROCEDURE — 25510000001 IOPAMIDOL 61 % SOLUTION: Performed by: EMERGENCY MEDICINE

## 2023-07-20 PROCEDURE — 71260 CT THORAX DX C+: CPT

## 2023-07-20 PROCEDURE — 93018 CV STRESS TEST I&R ONLY: CPT | Performed by: INTERNAL MEDICINE

## 2023-07-20 PROCEDURE — 93017 CV STRESS TEST TRACING ONLY: CPT

## 2023-07-20 PROCEDURE — 99285 EMERGENCY DEPT VISIT HI MDM: CPT

## 2023-07-20 PROCEDURE — 83690 ASSAY OF LIPASE: CPT

## 2023-07-20 RX ORDER — ACETAMINOPHEN 650 MG/1
650 SUPPOSITORY RECTAL EVERY 4 HOURS PRN
Status: DISCONTINUED | OUTPATIENT
Start: 2023-07-20 | End: 2023-07-20 | Stop reason: HOSPADM

## 2023-07-20 RX ORDER — ACETAMINOPHEN 160 MG/5ML
650 SOLUTION ORAL EVERY 4 HOURS PRN
Status: DISCONTINUED | OUTPATIENT
Start: 2023-07-20 | End: 2023-07-20 | Stop reason: HOSPADM

## 2023-07-20 RX ORDER — SODIUM CHLORIDE 0.9 % (FLUSH) 0.9 %
10 SYRINGE (ML) INJECTION EVERY 12 HOURS SCHEDULED
Status: DISCONTINUED | OUTPATIENT
Start: 2023-07-20 | End: 2023-07-20 | Stop reason: HOSPADM

## 2023-07-20 RX ORDER — PRASUGREL 10 MG/1
10 TABLET, FILM COATED ORAL DAILY
Status: DISCONTINUED | OUTPATIENT
Start: 2023-07-20 | End: 2023-07-20 | Stop reason: HOSPADM

## 2023-07-20 RX ORDER — SODIUM CHLORIDE 0.9 % (FLUSH) 0.9 %
10 SYRINGE (ML) INJECTION AS NEEDED
Status: DISCONTINUED | OUTPATIENT
Start: 2023-07-20 | End: 2023-07-20 | Stop reason: HOSPADM

## 2023-07-20 RX ORDER — BISOPROLOL FUMARATE 5 MG/1
10 TABLET, FILM COATED ORAL DAILY
Status: DISCONTINUED | OUTPATIENT
Start: 2023-07-20 | End: 2023-07-20 | Stop reason: HOSPADM

## 2023-07-20 RX ORDER — ATORVASTATIN CALCIUM 40 MG/1
80 TABLET, FILM COATED ORAL NIGHTLY
Status: DISCONTINUED | OUTPATIENT
Start: 2023-07-20 | End: 2023-07-20 | Stop reason: HOSPADM

## 2023-07-20 RX ORDER — HYDROMORPHONE HYDROCHLORIDE 1 MG/ML
0.5 INJECTION, SOLUTION INTRAMUSCULAR; INTRAVENOUS; SUBCUTANEOUS ONCE
Status: COMPLETED | OUTPATIENT
Start: 2023-07-20 | End: 2023-07-20

## 2023-07-20 RX ORDER — ACETAMINOPHEN 325 MG/1
650 TABLET ORAL EVERY 4 HOURS PRN
Status: DISCONTINUED | OUTPATIENT
Start: 2023-07-20 | End: 2023-07-20 | Stop reason: HOSPADM

## 2023-07-20 RX ORDER — BISACODYL 10 MG
10 SUPPOSITORY, RECTAL RECTAL DAILY PRN
Status: DISCONTINUED | OUTPATIENT
Start: 2023-07-20 | End: 2023-07-20 | Stop reason: HOSPADM

## 2023-07-20 RX ORDER — LOSARTAN POTASSIUM 50 MG/1
100 TABLET ORAL
Status: DISCONTINUED | OUTPATIENT
Start: 2023-07-20 | End: 2023-07-20 | Stop reason: HOSPADM

## 2023-07-20 RX ORDER — BISACODYL 5 MG/1
5 TABLET, DELAYED RELEASE ORAL DAILY PRN
Status: DISCONTINUED | OUTPATIENT
Start: 2023-07-20 | End: 2023-07-20 | Stop reason: HOSPADM

## 2023-07-20 RX ORDER — AMOXICILLIN 250 MG
2 CAPSULE ORAL 2 TIMES DAILY
Status: DISCONTINUED | OUTPATIENT
Start: 2023-07-20 | End: 2023-07-20 | Stop reason: HOSPADM

## 2023-07-20 RX ORDER — SODIUM CHLORIDE 9 MG/ML
40 INJECTION, SOLUTION INTRAVENOUS AS NEEDED
Status: DISCONTINUED | OUTPATIENT
Start: 2023-07-20 | End: 2023-07-20 | Stop reason: HOSPADM

## 2023-07-20 RX ORDER — POLYETHYLENE GLYCOL 3350 17 G/17G
17 POWDER, FOR SOLUTION ORAL DAILY PRN
Status: DISCONTINUED | OUTPATIENT
Start: 2023-07-20 | End: 2023-07-20 | Stop reason: HOSPADM

## 2023-07-20 RX ORDER — ONDANSETRON 2 MG/ML
4 INJECTION INTRAMUSCULAR; INTRAVENOUS ONCE
Status: COMPLETED | OUTPATIENT
Start: 2023-07-20 | End: 2023-07-20

## 2023-07-20 RX ORDER — FLUOXETINE HYDROCHLORIDE 20 MG/1
20 CAPSULE ORAL DAILY
Status: DISCONTINUED | OUTPATIENT
Start: 2023-07-20 | End: 2023-07-20 | Stop reason: HOSPADM

## 2023-07-20 RX ORDER — PANTOPRAZOLE SODIUM 40 MG/1
40 TABLET, DELAYED RELEASE ORAL
Status: DISCONTINUED | OUTPATIENT
Start: 2023-07-20 | End: 2023-07-20 | Stop reason: HOSPADM

## 2023-07-20 RX ORDER — REGADENOSON 0.08 MG/ML
0.4 INJECTION, SOLUTION INTRAVENOUS ONCE
Status: COMPLETED | OUTPATIENT
Start: 2023-07-20 | End: 2023-07-20

## 2023-07-20 RX ORDER — MORPHINE SULFATE 2 MG/ML
2 INJECTION, SOLUTION INTRAMUSCULAR; INTRAVENOUS ONCE
Status: COMPLETED | OUTPATIENT
Start: 2023-07-20 | End: 2023-07-20

## 2023-07-20 RX ORDER — CLONIDINE HYDROCHLORIDE 0.1 MG/1
0.1 TABLET ORAL EVERY 12 HOURS PRN
Qty: 30 TABLET | Refills: 0 | Status: SHIPPED | OUTPATIENT
Start: 2023-07-20

## 2023-07-20 RX ORDER — NITROGLYCERIN 20 MG/100ML
5-50 INJECTION INTRAVENOUS
Status: DISCONTINUED | OUTPATIENT
Start: 2023-07-20 | End: 2023-07-20

## 2023-07-20 RX ADMIN — BISOPROLOL FUMARATE 10 MG: 5 TABLET, FILM COATED ORAL at 12:27

## 2023-07-20 RX ADMIN — MORPHINE SULFATE 2 MG: 2 INJECTION, SOLUTION INTRAMUSCULAR; INTRAVENOUS at 03:16

## 2023-07-20 RX ADMIN — NITROGLYCERIN 10 MCG/MIN: 20 INJECTION INTRAVENOUS at 03:17

## 2023-07-20 RX ADMIN — IOPAMIDOL 85 ML: 612 INJECTION, SOLUTION INTRAVENOUS at 03:44

## 2023-07-20 RX ADMIN — REGADENOSON 0.4 MG: 0.08 INJECTION, SOLUTION INTRAVENOUS at 11:00

## 2023-07-20 RX ADMIN — FLUOXETINE 20 MG: 20 CAPSULE ORAL at 12:27

## 2023-07-20 RX ADMIN — PRASUGREL 10 MG: 10 TABLET, FILM COATED ORAL at 12:27

## 2023-07-20 RX ADMIN — ONDANSETRON 4 MG: 2 INJECTION INTRAMUSCULAR; INTRAVENOUS at 03:17

## 2023-07-20 RX ADMIN — RUBIDIUM CHLORIDE RB-82 1 DOSE: 150 INJECTION, SOLUTION INTRAVENOUS at 11:01

## 2023-07-20 RX ADMIN — NICARDIPINE HYDROCHLORIDE 5 MG/HR: 25 INJECTION, SOLUTION INTRAVENOUS at 04:52

## 2023-07-20 RX ADMIN — LOSARTAN POTASSIUM 100 MG: 50 TABLET, FILM COATED ORAL at 12:55

## 2023-07-20 RX ADMIN — RUBIDIUM CHLORIDE RB-82 1 DOSE: 150 INJECTION, SOLUTION INTRAVENOUS at 10:50

## 2023-07-20 RX ADMIN — HYDROMORPHONE HYDROCHLORIDE 0.5 MG: 1 INJECTION, SOLUTION INTRAMUSCULAR; INTRAVENOUS; SUBCUTANEOUS at 04:53

## 2023-07-20 NOTE — ED PROVIDER NOTES
Subjective   History of Present Illness  48-year-old female presents to the emergency department with concerns about chest pain.  She has a known history of coronary artery disease.  Pain started with bilateral shoulder pain and then started radiating to her chest.  Pain is 8 out of 10 in intensity and constant.  Her cardiologist is Dr. Goncalves.  Pain started at approximately 2100.  Pain feels similar to cardiac chest pain per patient.  She denies medication noncompliance recently.    Review of Systems   Constitutional:  Negative for diaphoresis and fever.   HENT:  Negative for facial swelling.    Eyes:  Negative for photophobia and discharge.   Respiratory:  Negative for stridor.    Cardiovascular:  Positive for chest pain.   Neurological:  Negative for facial asymmetry and speech difficulty.     Past Medical History:   Diagnosis Date    ADHD     Anxiety     Bipolar affective     Depression     Graves disease 1994?    Hypertension     Hyperthyroidism 1994?    Hypothyroidism 2002?    Migraine     Myocardial infarction 08/19/2022    Thyroid nodule 2022    Vitamin D deficiency 2010       Allergies   Allergen Reactions    Aspirin Anaphylaxis     Patient tolerates ibuprofen    Penicillins Hives and Itching       Past Surgical History:   Procedure Laterality Date    CARDIAC CATHETERIZATION N/A 08/19/2022    Procedure: Left Heart Cath;  Surgeon: Jeane Goncalves MD;  Location: [facility] CATH INVASIVE LOCATION;  Service: Cardiovascular;  Laterality: N/A;    PANCREAS SURGERY  5/28/2013       Family History   Problem Relation Age of Onset    Breast cancer Mother 76    Cancer Father     Cancer Brother     Ovarian cancer Neg Hx        Social History     Socioeconomic History    Marital status:    Tobacco Use    Smoking status: Never     Passive exposure: Never    Smokeless tobacco: Never   Vaping Use    Vaping Use: Never used   Substance and Sexual Activity    Alcohol use: Not Currently     Alcohol/week: 1.0  standard drink     Types: 1 Glasses of wine per week    Drug use: No    Sexual activity: Not Currently     Partners: Male     Birth control/protection: Condom, Pill, I.U.D., Implant, Injection, Post-menopausal, Depo-provera, Birth control pill           Objective   Physical Exam  Vitals and nursing note reviewed.   Constitutional:       Appearance: She is not diaphoretic.      Comments: Appears uncomfortable and anxious.  BMI 32.   Neck:      Comments: No meningismus.  Cardiovascular:      Rate and Rhythm: Normal rate and regular rhythm.      Heart sounds: No murmur heard.    No friction rub. No gallop.      Comments: S1, S2  Pulmonary:      Effort: Pulmonary effort is normal. No respiratory distress.      Breath sounds: Normal breath sounds. No stridor. No decreased breath sounds, wheezing, rhonchi or rales.      Comments: Good air entry.  Chest:      Chest wall: No tenderness.   Abdominal:      Palpations: Abdomen is soft.      Tenderness: There is no abdominal tenderness. There is no guarding or rebound.      Comments: Nondistended.   Musculoskeletal:      Cervical back: Neck supple.      Comments: No significant peripheral edema.  No calf tenderness bilaterally.   Skin:     General: Skin is warm and dry.      Coloration: Skin is not cyanotic.   Neurological:      Mental Status: She is alert.      Comments: No speech, no dysarthria.  No facial droop.  Moves all extremities.                  ED Course           Patient was re-evaluated multiple times during her ED course. Blood pressure remained elevated despite nitroglycerin drip. She developed a headache and pain all over and blood pressure was still high, so nitroglycerin drip was discontinued, and Cardene drip was ordered. Headache improved following this intervention. Results and plan discussed with patient prior to disposition, all questions addressed.                                 Medical Decision Making  Differential diagnosis includes hypertensive  urgency, hypertensive emergency, aortic dissection, acute coronary syndrome, esophageal spasm, and others. Anxiety would be a diagnosis of exclusion.    Problems Addressed:  Coronary artery disease with other form of angina pectoris, unspecified vessel or lesion type, unspecified whether native or transplanted heart: complicated acute illness or injury  Poorly-controlled hypertension: complicated acute illness or injury  Precordial chest pain: complicated acute illness or injury    Amount and/or Complexity of Data Reviewed  External Data Reviewed: ECG.     Details: Prior EKG reviewed.  Labs: ordered. Decision-making details documented in ED Course.  Radiology: ordered. Decision-making details documented in ED Course.  ECG/medicine tests: ordered and independent interpretation performed. Decision-making details documented in ED Course.     Details: EKG at 0124 shows normal sinus rhythm at a rate of 73 bpm, normal intervals, no acute ischemic changes.  Repeat EKG at 0432 shows normal sinus rhythm at a rate of 66 bpm, normal intervals, no acute ischemic changes, no significant dynamic EKG changes.  Discussion of management or test interpretation with external provider(s): Will contact hospitalist regarding admission.    Risk  Prescription drug management.  Decision regarding hospitalization.      Recent Results (from the past 24 hour(s))   ECG 12 Lead ED Triage Standing Order; Chest Pain    Collection Time: 07/20/23  1:24 AM   Result Value Ref Range    QT Interval 400 ms    QTC Interval 440 ms   High Sensitivity Troponin T    Collection Time: 07/20/23  1:37 AM    Specimen: Blood   Result Value Ref Range    HS Troponin T <6 <10 ng/L   Comprehensive Metabolic Panel    Collection Time: 07/20/23  1:37 AM    Specimen: Blood   Result Value Ref Range    Glucose 131 (H) 65 - 99 mg/dL    BUN 14 6 - 20 mg/dL    Creatinine 0.64 0.57 - 1.00 mg/dL    Sodium 140 136 - 145 mmol/L    Potassium 4.0 3.5 - 5.2 mmol/L    Chloride 106 98 -  107 mmol/L    CO2 21.0 (L) 22.0 - 29.0 mmol/L    Calcium 9.0 8.6 - 10.5 mg/dL    Total Protein 6.7 6.0 - 8.5 g/dL    Albumin 3.9 3.5 - 5.2 g/dL    ALT (SGPT) 40 (H) 1 - 33 U/L    AST (SGOT) 35 (H) 1 - 32 U/L    Alkaline Phosphatase 113 39 - 117 U/L    Total Bilirubin 0.4 0.0 - 1.2 mg/dL    Globulin 2.8 gm/dL    A/G Ratio 1.4 g/dL    BUN/Creatinine Ratio 21.9 7.0 - 25.0    Anion Gap 13.0 5.0 - 15.0 mmol/L    eGFR 107.8 >60.0 mL/min/1.73   Lipase    Collection Time: 07/20/23  1:37 AM    Specimen: Blood   Result Value Ref Range    Lipase 51 13 - 60 U/L   BNP    Collection Time: 07/20/23  1:37 AM    Specimen: Blood   Result Value Ref Range    proBNP 154.2 0.0 - 900.0 pg/mL   Green Top (Gel)    Collection Time: 07/20/23  1:37 AM   Result Value Ref Range    Extra Tube Hold for add-ons.    Lavender Top    Collection Time: 07/20/23  1:37 AM   Result Value Ref Range    Extra Tube hold for add-on    CBC Auto Differential    Collection Time: 07/20/23  1:37 AM    Specimen: Blood   Result Value Ref Range    WBC 12.90 (H) 3.40 - 10.80 10*3/mm3    RBC 5.12 3.77 - 5.28 10*6/mm3    Hemoglobin 15.3 12.0 - 15.9 g/dL    Hematocrit 45.4 34.0 - 46.6 %    MCV 88.7 79.0 - 97.0 fL    MCH 29.9 26.6 - 33.0 pg    MCHC 33.7 31.5 - 35.7 g/dL    RDW 12.7 12.3 - 15.4 %    RDW-SD 41.3 37.0 - 54.0 fl    MPV 9.4 6.0 - 12.0 fL    Platelets 379 140 - 450 10*3/mm3    Neutrophil % 47.8 42.7 - 76.0 %    Lymphocyte % 38.1 19.6 - 45.3 %    Monocyte % 11.0 5.0 - 12.0 %    Eosinophil % 2.2 0.3 - 6.2 %    Basophil % 0.6 0.0 - 1.5 %    Immature Grans % 0.3 0.0 - 0.5 %    Neutrophils, Absolute 6.16 1.70 - 7.00 10*3/mm3    Lymphocytes, Absolute 4.92 (H) 0.70 - 3.10 10*3/mm3    Monocytes, Absolute 1.42 (H) 0.10 - 0.90 10*3/mm3    Eosinophils, Absolute 0.28 0.00 - 0.40 10*3/mm3    Basophils, Absolute 0.08 0.00 - 0.20 10*3/mm3    Immature Grans, Absolute 0.04 0.00 - 0.05 10*3/mm3    nRBC 0.0 0.0 - 0.2 /100 WBC   High Sensitivity Troponin T 2Hr    Collection Time:  07/20/23  3:21 AM    Specimen: Blood   Result Value Ref Range    HS Troponin T <6 <10 ng/L    Troponin T Delta     ECG 12 Lead ED Triage Standing Order; Chest Pain    Collection Time: 07/20/23  4:32 AM   Result Value Ref Range    QT Interval 426 ms    QTC Interval 446 ms     Note: In addition to lab results from this visit, the labs listed above may include labs taken at another facility or during a different encounter within the last 24 hours. Please correlate lab times with ED admission and discharge times for further clarification of the services performed during this visit.    CT Chest With Contrast Diagnostic   Final Result   Impression:      1. No acute cardiopulmonary abnormality.   2. 30 mm dominant left thyroid lobe nodule. This was recently evaluated with ultrasound.         Electronically Signed: Grayson Dietz     7/20/2023 3:54 AM EDT     Workstation ID: [id number]      XR Chest 1 View   Final Result   Impression:   No acute cardiopulmonary abnormality.         Electronically Signed: Grayson Dietz     7/20/2023 2:19 AM EDT     Workstation ID: [id number]        Vitals:    07/20/23 0330 07/20/23 0352 07/20/23 0400 07/20/23 0415   BP: (!) 199/123 (!) 211/129 (!) 200/122 (!) 197/118   BP Location:       Patient Position:       Pulse: 69 70 73 71   Resp:       Temp:       TempSrc:       SpO2: 90% 92% 92% 93%   Weight:       Height:         Medications   sodium chloride 0.9 % flush 10 mL (has no administration in time range)   niCARdipine (CARDENE) 25mg in 250mL NS infusion (5 mg/hr Intravenous New Bag 7/20/23 0452)   ondansetron (ZOFRAN) injection 4 mg (4 mg Intravenous Given 7/20/23 0007)   morphine injection 2 mg (2 mg Intravenous Given 7/20/23 0316)   iopamidol (ISOVUE-300) 61 % injection 100 mL (85 mL Intravenous Given 7/20/23 1544)   HYDROmorphone (DILAUDID) injection 0.5 mg (0.5 mg Intravenous Given 7/20/23 4303)     ECG/EMG Results (last 24 hours)       Procedure Component Value Units Date/Time    ECG 12  Lead ED Triage Standing Order; Chest Pain [[id number]] Collected: 07/20/23 0124     Updated: 07/20/23 0127     QT Interval 400 ms      QTC Interval 440 ms     Narrative:      Test Reason : ED Triage Standing Order~  Blood Pressure :   */*   mmHG  Vent. Rate :  73 BPM     Atrial Rate :  73 BPM     P-R Int : 186 ms          QRS Dur :  82 ms      QT Int : 400 ms       P-R-T Axes :  20  47  45 degrees     QTc Int : 440 ms    Normal sinus rhythm  Cannot rule out Anterior infarct , age undetermined  Abnormal ECG  When compared with ECG of 21-MAR-2023 14:37,  No significant change was found    Referred By:            Confirmed By:     ECG 12 Lead ED Triage Standing Order; Chest Pain [[id number]] Collected: 07/20/23 0432     Updated: 07/20/23 0432     QT Interval 426 ms      QTC Interval 446 ms     Narrative:      Test Reason : ED Triage Standing Order~  Blood Pressure :   */*   mmHG  Vent. Rate :  66 BPM     Atrial Rate :  66 BPM     P-R Int : 190 ms          QRS Dur :  88 ms      QT Int : 426 ms       P-R-T Axes :  31  44  45 degrees     QTc Int : 446 ms    Normal sinus rhythm  Cannot rule out Anterior infarct (cited on or before 19-AUG-2022)  Abnormal ECG  When compared with ECG of 20-JUL-2023 01:24, (Unconfirmed)  No significant change was found    Referred By: EDMD           Confirmed By:           ECG 12 Lead ED Triage Standing Order; Chest Pain   Preliminary Result   Test Reason : ED Triage Standing Order~   Blood Pressure :   */*   mmHG   Vent. Rate :  66 BPM     Atrial Rate :  66 BPM      P-R Int : 190 ms          QRS Dur :  88 ms       QT Int : 426 ms       P-R-T Axes :  31  44  45 degrees      QTc Int : 446 ms      Normal sinus rhythm   Cannot rule out Anterior infarct (cited on or before 19-AUG-2022)   Abnormal ECG   When compared with ECG of 20-JUL-2023 01:24, (Unconfirmed)   No significant change was found      Referred By: EDMD           Confirmed By:       ECG 12 Lead ED Triage Standing Order; Chest Pain    Preliminary Result   Test Reason : ED Triage Standing Order~   Blood Pressure :   */*   mmHG   Vent. Rate :  73 BPM     Atrial Rate :  73 BPM      P-R Int : 186 ms          QRS Dur :  82 ms       QT Int : 400 ms       P-R-T Axes :  20  47  45 degrees      QTc Int : 440 ms      Normal sinus rhythm   Cannot rule out Anterior infarct , age undetermined   Abnormal ECG   When compared with ECG of 21-MAR-2023 14:37,   No significant change was found      Referred By:            Confirmed By:               Final diagnoses:   Poorly-controlled hypertension   Precordial chest pain   Coronary artery disease with other form of angina pectoris, unspecified vessel or lesion type, unspecified whether native or transplanted heart       ED Disposition  ED Disposition       ED Disposition   Decision to Admit    Condition   --    Comment   --               No follow-up provider specified.       Medication List      No changes were made to your prescriptions during this visit.            Carlita Gifford MD  07/21/23 [id number]

## 2023-07-20 NOTE — H&P
[facility] Medicine Services  HISTORY AND PHYSICAL    Patient Name: Rebekah Ga  : 1975  MRN: [id number]  Primary Care Physician: Jozef Cummings MD  Date of admission: 2023    Subjective   Subjective     Chief Complaint:  HTN urgency    HPI:  Rebekah Ga is a 48 y.o. female with PMH of HTN, HLD, CAD, Bipolar disorder, anxiety/depression presents to the ED for right shoulder pain.  Patient states after she ate dinner, her right shoulder became numb.  She then developed chest pressure.  She got in the shower and felt dizzy.  She had associated shortness of breath.  She tried to lay down, but had worsening chest pain.  She took a sublingual nitro at home, which helped briefly.  Patient states her chest was tender to the touch.  She states she had a similar feeling when she had an MI last August.  Denies missing medication.  Patient takes her blood pressure at home, which usually runs in the 120s systolically.  Patient does report being under increased stress the past couple weeks.  Denies fever, abdominal pain, N/V/D, dysuria.        Review of Systems   Constitutional:  Negative for activity change, appetite change and fever.   HENT:  Negative for congestion, sore throat and trouble swallowing.    Eyes: Negative.    Respiratory:  Positive for chest tightness and shortness of breath. Negative for cough.    Cardiovascular:  Positive for chest pain. Negative for leg swelling.   Gastrointestinal:  Negative for abdominal distention, abdominal pain, diarrhea, nausea and vomiting.   Endocrine: Negative.    Genitourinary:  Negative for difficulty urinating, dysuria and urgency.   Musculoskeletal: Negative.    Skin: Negative.    Neurological:  Positive for dizziness and numbness (Right shoulder). Negative for syncope and weakness.   Hematological: Negative.    Psychiatric/Behavioral:  Negative for agitation and confusion.           Personal History     Past Medical  History:   Diagnosis Date    ADHD     Anxiety     Bipolar affective     CAD (coronary artery disease) 7/20/2023    Depression     Graves disease 1994?    Hypertension     Hyperthyroidism 1994?    Hypothyroidism 2002?    Migraine     Myocardial infarction 08/19/2022    Thyroid nodule 2022    Vitamin D deficiency 2010             Past Surgical History:   Procedure Laterality Date    CARDIAC CATHETERIZATION N/A 08/19/2022    Procedure: Left Heart Cath;  Surgeon: Jeane Goncalves MD;  Location: [facility] CATH INVASIVE LOCATION;  Service: Cardiovascular;  Laterality: N/A;    PANCREAS SURGERY  5/28/2013       Family History:  family history includes Breast cancer (age of onset: 76) in her mother; Cancer in her brother and father.     Social History:  reports that she has never smoked. She has never been exposed to tobacco smoke. She has never used smokeless tobacco. She reports that she does not currently use alcohol after a past usage of about 1.0 standard drink per week. She reports that she does not use drugs.  Social History     Social History Narrative    Not on file       Medications:  FLUoxetine, Fluticasone-Salmeterol, Pancrelipase (Lip-Prot-Amyl), Vitamin D3, albuterol sulfate HFA, atorvastatin, bisoprolol, clonazePAM, cyclobenzaprine, losartan, methocarbamol, nitroglycerin, omeprazole, prasugrel, and ubrogepant    Allergies   Allergen Reactions    Aspirin Anaphylaxis     Patient tolerates ibuprofen    Penicillins Hives and Itching       Objective   Objective     Vital Signs:   Temp:  [97.9 °F (36.6 °C)] 97.9 °F (36.6 °C)  Heart Rate:  [62-74] 66  Resp:  [20] 20  BP: (190-225)/(112-134) 194/112    Physical Exam  Constitutional:       General: She is not in acute distress.     Appearance: She is obese.   HENT:      Head: Normocephalic.      Nose: Nose normal. No congestion.      Mouth/Throat:      Mouth: Mucous membranes are moist.   Eyes:      Extraocular Movements: Extraocular movements intact.      Pupils:  Pupils are equal, round, and reactive to light.   Cardiovascular:      Rate and Rhythm: Normal rate and regular rhythm.      Pulses: Normal pulses.      Heart sounds: Normal heart sounds. No murmur heard.  Pulmonary:      Effort: Pulmonary effort is normal. No respiratory distress.      Breath sounds: Normal breath sounds. No wheezing or rhonchi.   Abdominal:      General: Bowel sounds are normal. There is no distension.      Tenderness: There is no abdominal tenderness.   Musculoskeletal:         General: No swelling. Normal range of motion.      Cervical back: Normal range of motion. No rigidity.   Skin:     General: Skin is warm.      Capillary Refill: Capillary refill takes less than 2 seconds.   Neurological:      General: No focal deficit present.      Mental Status: She is alert and oriented to person, place, and time. Mental status is at baseline.      Motor: No weakness.   Psychiatric:         Mood and Affect: Mood normal.         Behavior: Behavior normal.         Thought Content: Thought content normal.         Judgment: Judgment normal.          Result Review:  I have personally reviewed the results from the time of this admission to 7/20/2023 05:22 EDT and agree with these findings:  [x]  Laboratory list / accordion  [x]  Microbiology  [x]  Radiology  [x]  EKG/Telemetry   []  Cardiology/Vascular   []  Pathology  [x]  Old records  []  Other:  Most notable findings include:     LAB RESULTS:      Lab 07/20/23 0137   WBC 12.90*   HEMOGLOBIN 15.3   HEMATOCRIT 45.4   PLATELETS 379   NEUTROS ABS 6.16   IMMATURE GRANS (ABS) 0.04   LYMPHS ABS 4.92*   MONOS ABS 1.42*   EOS ABS 0.28   MCV 88.7         Lab 07/20/23  0137   SODIUM 140   POTASSIUM 4.0   CHLORIDE 106   CO2 21.0*   ANION GAP 13.0   BUN 14   CREATININE 0.64   EGFR 107.8   GLUCOSE 131*   CALCIUM 9.0         Lab 07/20/23  0137   TOTAL PROTEIN 6.7   ALBUMIN 3.9   GLOBULIN 2.8   ALT (SGPT) 40*   AST (SGOT) 35*   BILIRUBIN 0.4   ALK PHOS 113   LIPASE 51          Lab 07/20/23  0321 07/20/23  0137   PROBNP  --  154.2   HSTROP T <6 <6                 Brief Urine Lab Results  (Last result in the past 365 days)        Color   Clarity   Blood   Leuk Est   Nitrite   Protein   CREAT   Urine HCG        03/21/23 1405 Yellow   Clear   Small (1+)   Negative   Negative   Negative                 Microbiology Results (last 10 days)       ** No results found for the last 240 hours. **            CT Chest With Contrast Diagnostic    Result Date: 7/20/2023  CT CHEST W CONTRAST DIAGNOSTIC Date of Exam: 7/20/2023 3:40 AM EDT Indication: Chest pain, nonspecific hypertension and severe chest pain evaluate for aortic dissection. Comparison: 6/6/2022 and chest radiograph 7/20/2023. Technique: Axial CT images were obtained of the chest after the uneventful intravenous administration of 85 mL Isovue-300.  Reconstructed coronal and sagittal images were also obtained. Automated exposure control and iterative construction methods were used. Findings: There is no pneumothorax, pleural effusion or focal airspace consolidation. No significant pleural disease. No suspicious lung nodules.  Central and segmental airways appear patent. There is no evidence of pulmonary embolism. There is a 30 mm nodule at the inferior aspect of the left thyroid lobe this appears to have slightly increased in size from 2020. The trachea and esophagus appear unremarkable.  Heart size is normal.  No significant pericardial effusion.  No evidence of  mediastinal or hilar lymphadenopathy.  The aortic branch vessels, aorta and pulmonary artery appear within normal limits. No evidence of aneurysm or dissection. Subcutaneous fat and underlying musculature appear within normal limits.  There are no acute osseous abnormalities or destructive bone lesions. 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 There is a stable small cyst or hemangioma in the right hepatic lobe measuring 10 mm.     Impression:  Impression: 1. No acute cardiopulmonary abnormality. 2. 30 mm dominant left thyroid lobe nodule. This was recently evaluated with ultrasound. Electronically Signed: Grayson Dietz  7/20/2023 3:54 AM EDT  Workstation ID: [id number]    XR Chest 1 View    Result Date: 7/20/2023  XR CHEST 1 VW Date of Exam: 7/20/2023 1:38 AM EDT Indication: Chest Pain Triage Protocol Comparison: 3/21/2023. Findings: There is no pneumothorax, pleural effusion or focal airspace consolidation. Heart size and pulmonary vasculature appear within normal limits. Regional bones appear intact.     Impression: Impression: No acute cardiopulmonary abnormality. Electronically Signed: Grayson Dietz  7/20/2023 2:19 AM EDT  Workstation ID: [id number]     Results for orders placed during the hospital encounter of 11/08/22    Adult Transthoracic Echo Complete W/ Cont if Necessary Per Protocol    Interpretation Summary    Left ventricular systolic function is normal. Estimated left ventricular EF = 60%    Left ventricular wall thickness is consistent with borderline concentric hypertrophy.    Left ventricular diastolic function was normal.    Mild mitral and tricuspid regurgitation.    Calculated right ventricular systolic pressure from tricuspid regurgitation is 22 mmHg.      Assessment & Plan   Assessment & Plan       Hypertensive urgency    Bipolar affective    Hypertension    CAD (coronary artery disease)    Leukocytosis    Rebekah Ga is a 48 y.o. female with PMH of HTN, HLD, CAD, Bipolar disorder, anxiety/depression presents to the ED for right shoulder pain.      Chest pain  CAD s/p stent  -Etiology likely hypertensive urgency  -CT chest shows no acute abnormality  -EKG shows normal sinus rhythm, no ST abnormality  -Troponin negative x2  -Continue statin and Effient  -Stress test in a.m.  -Follows with Dr. Goncalves  -Consider cardiology consult    Hypertensive urgency  -Cardene drip  -Continue home bisoprolol    Leukocytosis  -Appears  "chronically elevated  -No signs of infectious process  -Hold off on antibiotics    Bipolar disorder  -Continue home Prozac and as needed Klonopin        DVT prophylaxis:  SCDs    CODE STATUS:  FULL       Expected Discharge  Expected discharge date/ time has not been documented.      This note has been completed as part of a split-shared workflow.     Signature: Electronically signed by SHORTY May, 07/20/23, 5:47 AM EDT.    Total APC Time: 60 minutes          Attending   Admission Attestation       I have performed an independent face-to-face diagnostic evaluation including performing an independent physical examination as documented here.  The documented plan of care above was reviewed and developed with the advanced practice clinician (APC).      Brief Summary Statement:   Rebekah Ga is a 48 y.o. female who states that she noticed onset of chest pressure after eating dinner earlier tonight (Wednesday).  She states she noticed some dizziness after she went and took a shower soon after.  She developed some substernal chest pain after laying down, accompanied by shortness of breath.  She cannot cite any exacerbating or alleviating factors for the pain, describes it as dull, no radiation.  She states she has been diagnosed with prior MI and this felt \"pretty similar to that.\"  She states she took a sublingual nitro dose at home but this only relieved her symptoms \"for a few seconds.\"  She denies missing any of her home antihypertensives, but on arrival in the ED she had systolics in the 220s; nitroglycerin drip was started, but the ED physician states the patient did not tolerate this, so switched to Cardene which has yielded some improvement in terms of blood pressure control, and in fact during my visit her systolic was down to 140 so the Cardene drip will be stopped for now.    Remainder of detailed HPI is as noted by APC and has been reviewed and/or edited by me for completeness.    Attending Physical " Exam:  Temp:  [97.9 °F (36.6 °C)] 97.9 °F (36.6 °C)  Heart Rate:  [62-81] 78  Resp:  [16-20] 18  BP: (131-225)/() 131/85    Constitutional: Awake, alert, NAD, pleasant.  Eyes: PERRLA, sclerae anicteric, no conjunctival injection  HENT: NCAT, mucous membranes moist  Neck: Supple, no thyromegaly, no lymphadenopathy, trachea midline  Respiratory: Clear to auscultation bilaterally, nonlabored respirations   Cardiovascular: RRR, no S3/S4, no murmurs, rubs, or gallops, palpable pedal pulses bilaterally  Gastrointestinal: Positive bowel sounds, soft, nontender, nondistended  Musculoskeletal: No bilateral ankle edema, no clubbing or cyanosis to extremities  Psychiatric: Appropriate affect, cooperative  Neurologic: Oriented x 3, strength symmetric in all extremities, Cranial Nerves grossly intact to confrontation, speech clear  Skin: No rashes, normal turgor.    Brief Assessment/Plan :  See detailed assessment and plan developed with APC which I have reviewed and/or edited for completeness.    Total time spent: 25 minutes  Time spent includes time reviewing chart, face-to-face time, counseling patient/family/caregiver, ordering medications/tests/procedures, communicating with other health care professionals, documenting clinical information in the electronic health record, and coordination of care.         Robert Hemphill III, DO  07/20/23

## 2023-07-20 NOTE — CASE MANAGEMENT/SOCIAL WORK
Discharge Planning Assessment  [facility]     Patient Name: Rebekah Ga  MRN: [id number]  Today's Date: 7/20/2023    Admit Date: 7/20/2023    Plan: Home with family   Discharge Needs Assessment       Row Name 07/20/23 0832       Living Environment    People in Home spouse;child(jay), dependent    Name(s) of People in Home Jozef Ga (spouse) [phone]    Current Living Arrangements home    Potentially Unsafe Housing Conditions none    Primary Care Provided by self    Provides Primary Care For no one    Family Caregiver if Needed spouse    Able to Return to Prior Arrangements yes       Resource/Environmental Concerns    Resource/Environmental Concerns none    Transportation Concerns none       Transition Planning    Patient/Family Anticipates Transition to home with family    Patient/Family Anticipated Services at Transition none    Transportation Anticipated family or friend will provide       Discharge Needs Assessment    Readmission Within the Last 30 Days no previous admission in last 30 days    Equipment Currently Used at Home none    Concerns to be Addressed denies needs/concerns at this time    Anticipated Changes Related to Illness none    Equipment Needed After Discharge none                   Discharge Plan       Row Name 07/20/23 7351       Plan    Plan Home with family    Patient/Family in Agreement with Plan yes    Plan Comments Spoke with patient at bedside. Lives with Jozef Ga (spouse) [phone] in [facility]. Is independent with ADL's. No problems with [place] or medications. Uses no DME at home. Has no advanced directives. PCP is Jozef Cummings. Plan is home with family. CM will continue to follow.    Final Discharge Disposition Code 01 - home or self-care                  Continued Care and Services - Admitted Since 7/20/2023    Coordination has not been started for this encounter.       Expected Discharge Date and Time       Expected Discharge Date Expected Discharge  Time    Jul 21, 2023            Demographic Summary       Row Name 07/20/23 0831       General Information    Admission Type observation    Arrived From emergency department    Referral Source admission list    Reason for Consult discharge planning    Preferred Language English       Contact Information    Permission Granted to Share Info With     Contact Information Obtained for                    Functional Status       Row Name 07/20/23 0832       Functional Status    Usual Activity Tolerance good    Current Activity Tolerance good       Functional Status, IADL    Medications independent    Meal Preparation independent    Housekeeping independent    Laundry independent    Shopping independent       Mental Status    General Appearance WDL WDL       Mental Status Summary    Recent Changes in Mental Status/Cognitive Functioning no changes       Employment/    Employment Status employed full-time                   Psychosocial    No documentation.                  Abuse/Neglect    No documentation.                  Legal    No documentation.                  Substance Abuse    No documentation.                  Patient Forms    No documentation.                     Jermain Bonilla RN

## 2023-07-20 NOTE — CASE MANAGEMENT/SOCIAL WORK
Case Management Discharge Note      Final Note: Plan is home with family. Spouse will transport. No discharge needs were identified.         Selected Continued Care - Admitted Since 7/20/2023       Destination    No services have been selected for the patient.                Durable Medical Equipment    No services have been selected for the patient.                Dialysis/Infusion    No services have been selected for the patient.                Home Medical Care    No services have been selected for the patient.                Therapy    No services have been selected for the patient.                Community Resources    No services have been selected for the patient.                Community & DME    No services have been selected for the patient.                         Final Discharge Disposition Code: 01 - home or self-care

## 2023-07-20 NOTE — DISCHARGE SUMMARY
[facility] Medicine Services  DISCHARGE SUMMARY    Patient Name: Rebekah Ga  : 1975  MRN: [id number]    Date of Admission: 2023  1:43 AM  Date of Discharge:  2023  Primary Care Physician: Jozef Cummings MD    Consults       Date and Time Order Name Status Description    2023  8:45 AM Inpatient Cardiology Consult Completed             Hospital Course     Presenting Problem: HTN urgency     Active Hospital Problems    Diagnosis  POA    CAD (coronary artery disease) [I25.10]  Yes    Bipolar affective [F31.9]  Yes    Hypertension [I10]  Yes      Resolved Hospital Problems    Diagnosis Date Resolved POA    **Hypertensive urgency [I16.0] 2023 Yes    Leukocytosis [D72.829] 2023 Unknown          Hospital Course:  Rebekah Ga is a 48 y.o. female with PMH of HTN, HLD, CAD, Bipolar disorder, anxiety/depression presents to the ED for right shoulder pain.      This patient's problems and plans were partially entered by my partner and updated as appropriate by me 23.     Chest pain  CAD s/p stent  -Etiology likely hypertensive urgency  -CT chest shows no acute abnormality  -EKG shows normal sinus rhythm, no ST abnormality  -Troponin negative x2  -Continue statin and Effient  -Follows with Dr. Goncalves  - seen by Dr. Nevarez inpatient  - PET stress negative for ischemia   - cardiology recommends clonidine 0.1 mg p.o. as needed every 12 hours for blood pressures greater than 180/90 mmHg.      Hypertensive urgency  -s/p Cardene drip  - cardiology recommends clonidine 0.1 mg p.o. as needed every 12 hours for blood pressures greater than 180/90 mmHg.   -Continue home bisoprolol     Leukocytosis  -Appears chronically elevated  -No signs of infectious process     Bipolar disorder  -Continue home Prozac and as needed Klonopin    Discharge Follow Up Recommendations for outpatient labs/diagnostics:  -Clonidine 0.1 mg p.o. as needed every 12 hours  for blood pressures greater than 180/90 mmHg.     Day of Discharge     HPI:   Patient complaining of a headache, otherwise stable.    Review of Systems  Gen- No fevers, chills  CV- No chest pain, palpitations  Resp- No cough, dyspnea  GI- No N/V/D, abd pain    Vital Signs:   Temp:  [97.9 °F (36.6 °C)-98.3 °F (36.8 °C)] 98 °F (36.7 °C)  Heart Rate:  [62-81] 65  Resp:  [16-20] 18  BP: (118-225)/() 125/86      Physical Exam:  Constitutional: No acute distress, awake, alert  HENT: NCAT, mucous membranes moist  Respiratory: Clear to auscultation bilaterally, respiratory effort normal   Cardiovascular: RRR, no murmurs, rubs, or gallops  Gastrointestinal: Positive bowel sounds, soft, nontender, nondistended  Musculoskeletal: No bilateral ankle edema  Psychiatric: Appropriate affect, cooperative  Neurologic: Oriented x 3, strength symmetric in all extremities, Cranial Nerves grossly intact to confrontation, speech clear  Skin: No rashes    Pertinent  and/or Most Recent Results     LAB RESULTS:      Lab 07/20/23 0137   WBC 12.90*   HEMOGLOBIN 15.3   HEMATOCRIT 45.4   PLATELETS 379   NEUTROS ABS 6.16   IMMATURE GRANS (ABS) 0.04   LYMPHS ABS 4.92*   MONOS ABS 1.42*   EOS ABS 0.28   MCV 88.7         Lab 07/20/23  0137   SODIUM 140   POTASSIUM 4.0   CHLORIDE 106   CO2 21.0*   ANION GAP 13.0   BUN 14   CREATININE 0.64   EGFR 107.8   GLUCOSE 131*   CALCIUM 9.0         Lab 07/20/23 0137   TOTAL PROTEIN 6.7   ALBUMIN 3.9   GLOBULIN 2.8   ALT (SGPT) 40*   AST (SGOT) 35*   BILIRUBIN 0.4   ALK PHOS 113   LIPASE 51         Lab 07/20/23  0321 07/20/23 0137   PROBNP  --  154.2   HSTROP T <6 <6                 Brief Urine Lab Results  (Last result in the past 365 days)        Color   Clarity   Blood   Leuk Est   Nitrite   Protein   CREAT   Urine HCG        03/21/23 1405 Yellow   Clear   Small (1+)   Negative   Negative   Negative                 Microbiology Results (last 10 days)       ** No results found for the last 240 hours.  **            CT Chest With Contrast Diagnostic    Result Date: 7/20/2023  CT CHEST W CONTRAST DIAGNOSTIC Date of Exam: 7/20/2023 3:40 AM EDT Indication: Chest pain, nonspecific hypertension and severe chest pain evaluate for aortic dissection. Comparison: 6/6/2022 and chest radiograph 7/20/2023. Technique: Axial CT images were obtained of the chest after the uneventful intravenous administration of 85 mL Isovue-300.  Reconstructed coronal and sagittal images were also obtained. Automated exposure control and iterative construction methods were used. Findings: There is no pneumothorax, pleural effusion or focal airspace consolidation. No significant pleural disease. No suspicious lung nodules.  Central and segmental airways appear patent. There is no evidence of pulmonary embolism. There is a 30 mm nodule at the inferior aspect of the left thyroid lobe this appears to have slightly increased in size from 2020. The trachea and esophagus appear unremarkable.  Heart size is normal.  No significant pericardial effusion.  No evidence of  mediastinal or hilar lymphadenopathy.  The aortic branch vessels, aorta and pulmonary artery appear within normal limits. No evidence of aneurysm or dissection. Subcutaneous fat and underlying musculature appear within normal limits.  There are no acute osseous abnormalities or destructive bone lesions. 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 There is a stable small cyst or hemangioma in the right hepatic lobe measuring 10 mm.     Impression: 1. No acute cardiopulmonary abnormality. 2. 30 mm dominant left thyroid lobe nodule. This was recently evaluated with ultrasound. Electronically Signed: Grayson Dietz  7/20/2023 3:54 AM EDT  Workstation ID: [id number]    XR Chest 1 View    Result Date: 7/20/2023  XR CHEST 1 VW Date of Exam: 7/20/2023 1:38 AM EDT Indication: Chest Pain Triage Protocol Comparison: 3/21/2023. Findings: There is no pneumothorax, pleural effusion  or focal airspace consolidation. Heart size and pulmonary vasculature appear within normal limits. Regional bones appear intact.     Impression: No acute cardiopulmonary abnormality. Electronically Signed: Grayson Dietz  7/20/2023 2:19 AM EDT  Workstation ID: [id number]    Stress Test With Pet Myocardial Perfusion    Result Date: 7/20/2023    Left ventricular ejection fraction is normal.   REST EF = 83% STRESS EF = 82%.   Myocardial perfusion imaging indicates a normal myocardial perfusion study with no evidence of ischemia.   Impressions are consistent with a low risk study.              Results for orders placed during the hospital encounter of 11/08/22    Adult Transthoracic Echo Complete W/ Cont if Necessary Per Protocol    Interpretation Summary    Left ventricular systolic function is normal. Estimated left ventricular EF = 60%    Left ventricular wall thickness is consistent with borderline concentric hypertrophy.    Left ventricular diastolic function was normal.    Mild mitral and tricuspid regurgitation.    Calculated right ventricular systolic pressure from tricuspid regurgitation is 22 mmHg.      Plan for Follow-up of Pending Labs/Results: none    Discharge Details        Discharge Medications        New Medications        Instructions Start Date   cloNIDine 0.1 MG tablet  Commonly known as: Catapres   0.1 mg, Oral, Every 12 Hours PRN             Continue These Medications        Instructions Start Date   albuterol sulfate  (90 Base) MCG/ACT inhaler  Commonly known as: PROVENTIL HFA;VENTOLIN HFA;PROAIR HFA   2 puffs, Inhalation, 2 Times Daily PRN      atorvastatin 80 MG tablet  Commonly known as: LIPITOR   80 mg, Oral, Nightly      bisoprolol 10 MG tablet  Commonly known as: ZEBeta   10 mg, Oral, Daily      clonazePAM 0.5 MG tablet  Commonly known as: KlonoPIN   Take 1 tablet by mouth Daily As Needed for panic      Creon [id number] units capsule delayed-release particles capsule  Generic drug:  Pancrelipase (Lip-Prot-Amyl)   No dose, route, or frequency recorded.      cyclobenzaprine 10 MG tablet  Commonly known as: FLEXERIL   10 mg, Oral, 3 Times Daily PRN      FLUoxetine 20 MG capsule  Commonly known as: PROzac   Daily      Fluticasone-Salmeterol 100-50 MCG/ACT DISKUS  Commonly known as: ADVAIR/WIXELA   1 puff, Inhalation, As Needed      losartan 100 MG tablet  Commonly known as: Cozaar   100 mg, Oral, Daily      methocarbamol 750 MG tablet  Commonly known as: ROBAXIN   Take one tablet, by mouth, every 8 hours, as needed, for muscle spasm/tightness.      nitroglycerin 0.4 MG SL tablet  Commonly known as: NITROSTAT   1 under the tongue as needed for angina, may repeat q5mins for up three doses      omeprazole 20 MG capsule  Commonly known as: priLOSEC   20 mg, Oral, 2 Times Daily, Patient frequently only takes once daily      prasugrel 10 MG tablet  Commonly known as: EFFIENT   TAKE ONE TABLET BY MOUTH DAILY      Ubrelvy 100 MG tablet  Generic drug: ubrogepant   Take one as needed for headache, may repeat once in 2 hours, max 200 mg in 24 hours.      Vitamin D3 1.25 MG (70752 UT) capsule   50,000 Units, Oral, Weekly               Allergies   Allergen Reactions    Aspirin Anaphylaxis     Patient tolerates ibuprofen    Penicillins Hives and Itching         Discharge Disposition:  Home or Self Care    Diet:  Hospital:  Diet Order   Procedures    Diet: Regular/House Diet; Texture: Regular Texture (IDDSI 7); Fluid Consistency: Thin (IDDSI 0)       Activity: as tolerated      Restrictions or Other Recommendations:  none       CODE STATUS:    Code Status and Medical Interventions:   Ordered at: 07/20/23 0550     Code Status (Patient has no pulse and is not breathing):    CPR (Attempt to Resuscitate)     Medical Interventions (Patient has pulse or is breathing):    Full Support     Release to patient:    Routine Release       Future Appointments   Date Time Provider Department Center   10/12/2023  3:00 PM  Jeane Goncalves MD [facility]   6/6/2024 11:00 AM Andrew Burns MD [facility]       Additional Instructions for the Follow-ups that You Need to Schedule       Discharge Follow-up with PCP   As directed       Currently Documented PCP:    Jozef Cummings MD    PCP Phone Number:    [phone]     Follow Up Details: 1-2 weeks                       Justine Sweet DO  07/20/23      Time Spent on Discharge:  I spent  35  minutes on this discharge activity which included: face-to-face encounter with the patient, reviewing the data in the system, coordination of the care with the nursing staff as well as consultants, documentation, and entering orders.

## 2023-07-20 NOTE — Clinical Note
Level of Care: Telemetry [5]   Diagnosis: Hypertensive urgency [618247]   Admitting Physician: COBY MONTANEZ III [[id number]]   Attending Physician: COBY MONTANEZ III [[id number]]   Bed Request Comments: tele obs

## 2023-07-20 NOTE — CONSULTS
[facility]   Consult Note      Reason for Consultation: hypertension    Problem List:  Coronary artery disease  S/p anterolateral and inferior STEMI, 8/19/2022  [facility] 8/19/22: 100% occluded proximal LAD, s/p YOBANY. Non obstructive disease noted in RCA and LCx.   Echo 8/20/22: EF 50%. No sig VHD.  Echo, 11/08/2022; EF 60%. Borderline concentric LVH. Mild MR and TR. RVSP 22 mmHg.   Hypertension  Hyperlipidemia  Asthma  Anxiety  Depression         History of present illness:    Rebekah Ga is a 48 y.o. female  with PMH of HTN, HLD, CAD, Bipolar disorder, anxiety/depression presents to the ED for right shoulder pain and chest pain.  She states that it was similar to pain she had before in the past.  Lying down made it worse.  Tired NTG SL x 1 and NTG gtt.  Only relieved when she got dilaudid.  Currently chest pain free.   She is stating that she getting a migraine.       Patient Care Team:  Jozef Cummings MD as PCP - General (Internal Medicine)  Jeane Goncalves MD as Consulting Physician (Cardiology)    Past Medical History:   Diagnosis Date    ADHD     Anxiety     Bipolar affective     CAD (coronary artery disease) 7/20/2023    Depression     Graves disease 1994?    Hypertension     Hyperthyroidism 1994?    Hypothyroidism 2002?    Migraine     Myocardial infarction 08/19/2022    Thyroid nodule 2022    Vitamin D deficiency 2010       Past Surgical History:   Procedure Laterality Date    CARDIAC CATHETERIZATION N/A 08/19/2022    Procedure: Left Heart Cath;  Surgeon: Jeane Goncalves MD;  Location: [facility] CATH INVASIVE LOCATION;  Service: Cardiovascular;  Laterality: N/A;    PANCREAS SURGERY  5/28/2013         Allergies   Allergen Reactions    Aspirin Anaphylaxis     Patient tolerates ibuprofen    Penicillins Hives and Itching           Current Facility-Administered Medications:     acetaminophen (TYLENOL) tablet 650 mg, 650 mg, Oral, Q4H PRN **OR** acetaminophen  (TYLENOL) 160 MG/5ML solution 650 mg, 650 mg, Oral, Q4H PRN **OR** acetaminophen (TYLENOL) suppository 650 mg, 650 mg, Rectal, Q4H PRN, [employer], Emeli, APRN    atorvastatin (LIPITOR) tablet 80 mg, 80 mg, Oral, Nightly, [employer], Emeli, APRN    sennosides-docusate (PERICOLACE) 8.6-50 MG per tablet 2 tablet, 2 tablet, Oral, BID **AND** polyethylene glycol (MIRALAX) packet 17 g, 17 g, Oral, Daily PRN **AND** bisacodyl (DULCOLAX) EC tablet 5 mg, 5 mg, Oral, Daily PRN **AND** bisacodyl (DULCOLAX) suppository 10 mg, 10 mg, Rectal, Daily PRN, [employer], Emeli, APRN    bisoprolol (ZEBeta) tablet 10 mg, 10 mg, Oral, Daily, [employer], Emeli, APRN    FLUoxetine (PROzac) capsule 20 mg, 20 mg, Oral, Daily, [employer], Emeli, APRN    niCARdipine (CARDENE) 25mg in 250mL NS infusion, 5-15 mg/hr, Intravenous, Titrated, Carlita Gifford MD, Stopped at 07/20/23 0639    pantoprazole (PROTONIX) EC tablet 40 mg, 40 mg, Oral, Q AM, [employer], Emeli, APRN    prasugrel (EFFIENT) tablet 10 mg, 10 mg, Oral, Daily, [employer], Emeli, APRN    sodium chloride 0.9 % flush 10 mL, 10 mL, Intravenous, PRN, Carlita Gifford MD    sodium chloride 0.9 % flush 10 mL, 10 mL, Intravenous, Q12H, [employer], Emeli, APRN    sodium chloride 0.9 % flush 10 mL, 10 mL, Intravenous, PRN, [employer], Emeli, APRN    sodium chloride 0.9 % infusion 40 mL, 40 mL, Intravenous, PRN, [employer], Emeli, APRN    niCARdipine, 5-15 mg/hr, Last Rate: Stopped (07/20/23 0639)        Medications Prior to Admission   Medication Sig Dispense Refill Last Dose    albuterol sulfate  (90 Base) MCG/ACT inhaler Inhale 2 puffs 2 (Two) Times a Day As Needed for Shortness of Air.       atorvastatin (LIPITOR) 80 MG tablet Take 1 tablet by mouth Every Night. 90 tablet 11     bisoprolol (ZEBeta) 10 MG tablet Take 1 tablet by mouth Daily. 30 tablet 11     Cholecalciferol (Vitamin D3) 1.25 MG (13352 UT) capsule Take 1 capsule by mouth 1 (One) Time Per Week. 4 capsule 5     clonazePAM (KlonoPIN) 0.5 MG tablet Take 1 tablet by  mouth Daily As Needed for panic 15 tablet 0     Creon [id number] units capsule delayed-release particles capsule        cyclobenzaprine (FLEXERIL) 10 MG tablet Take 1 tablet by mouth 3 (Three) Times a Day As Needed for Muscle Spasms.       FLUoxetine (PROzac) 20 MG capsule Daily.       Fluticasone-Salmeterol (ADVAIR/WIXELA) 100-50 MCG/ACT DISKUS Inhale 1 puff As Needed.       losartan (Cozaar) 100 MG tablet Take 1 tablet by mouth Daily. 90 tablet 3     methocarbamol (ROBAXIN) 750 MG tablet Take one tablet, by mouth, every 8 hours, as needed, for muscle spasm/tightness. 60 tablet 0     nitroglycerin (NITROSTAT) 0.4 MG SL tablet 1 under the tongue as needed for angina, may repeat q5mins for up three doses 25 tablet 11     omeprazole (priLOSEC) 20 MG capsule Take 1 capsule by mouth 2 (Two) Times a Day. Patient frequently only takes once daily       prasugrel (EFFIENT) 10 MG tablet TAKE ONE TABLET BY MOUTH DAILY 90 tablet 3     ubrogepant (ubrogepant) 100 MG tablet Take one as needed for headache, may repeat once in 2 hours, max 200 mg in 24 hours. 10 tablet 5          Subjective .         Social History     Socioeconomic History    Marital status:    Tobacco Use    Smoking status: Never     Passive exposure: Never    Smokeless tobacco: Never   Vaping Use    Vaping Use: Never used   Substance and Sexual Activity    Alcohol use: Not Currently     Alcohol/week: 1.0 standard drink     Types: 1 Glasses of wine per week    Drug use: No    Sexual activity: Not Currently     Partners: Male     Birth control/protection: Condom, Pill, I.U.D., Implant, Injection, Post-menopausal, Depo-provera, Birth control pill     Family History   Problem Relation Age of Onset    Breast cancer Mother 76    Cancer Father     Cancer Brother     Ovarian cancer Neg Hx          Review of Systems:  ROS  Pertinent positives noted in history, exam, and assessment. Otherwise reviewed and negative.       Objective   Vitals:  /78 (BP  "Location: Right arm, Patient Position: Lying)   Pulse 78   Temp 98.3 °F (36.8 °C) (Oral)   Resp 18   Ht 157.5 cm (62\")   Wt 80.9 kg (178 lb 5.6 oz)   SpO2 94%   BMI 32.62 kg/m²      Intake/Output Summary (Last 24 hours) at 7/20/2023 1033  Last data filed at 7/20/2023 0639  Gross per 24 hour   Intake 95.72 ml   Output --   Net 95.72 ml       Vitals reviewed.   Constitutional:       Appearance: Not in distress.   Eyes:      Conjunctiva/sclera: Conjunctivae normal.      Pupils: Pupils are equal, round, and reactive to light.   Pulmonary:      Effort: Pulmonary effort is normal.      Breath sounds: Normal breath sounds.   Cardiovascular:      Normal rate. Regular rhythm.   Edema:     Peripheral edema absent.   Skin:     General: Skin is warm and dry.   Neurological:      Mental Status: Alert.             Results Review:  I reviewed the patient's new clinical results.  Results from last 7 days   Lab Units 07/20/23  0137   WBC 10*3/mm3 12.90*   HEMOGLOBIN g/dL 15.3   HEMATOCRIT % 45.4   PLATELETS 10*3/mm3 379     Results from last 7 days   Lab Units 07/20/23  0137   SODIUM mmol/L 140   POTASSIUM mmol/L 4.0   CHLORIDE mmol/L 106   CO2 mmol/L 21.0*   BUN mg/dL 14   CREATININE mg/dL 0.64   CALCIUM mg/dL 9.0   BILIRUBIN mg/dL 0.4   ALK PHOS U/L 113   ALT (SGPT) U/L 40*   AST (SGOT) U/L 35*   GLUCOSE mg/dL 131*     Results from last 7 days   Lab Units 07/20/23  0137   SODIUM mmol/L 140   POTASSIUM mmol/L 4.0   CHLORIDE mmol/L 106   CO2 mmol/L 21.0*   BUN mg/dL 14   CREATININE mg/dL 0.64   GLUCOSE mg/dL 131*   CALCIUM mg/dL 9.0         Lab Results   Lab Value Date/Time    TROPONINT <6 07/20/2023 0321    TROPONINT <6 07/20/2023 0137    TROPONINT <6 03/21/2023 1421    TROPONINT 0.309 (C) 08/21/2022 0438    TROPONINT 1.050 (C) 08/19/2022 2319    TROPONINT <0.010 08/19/2022 2116    TROPONINT <0.010 06/05/2022 2209             Results from last 7 days   Lab Units 07/20/23  0137   PROBNP pg/mL 154.2     Stress PET myocardial " perfusion:    Left ventricular ejection fraction is normal.    REST EF = 83% STRESS EF = 82%.    Myocardial perfusion imaging indicates a normal myocardial perfusion study with no evidence of ischemia.    Impressions are consistent with a low risk study.    ECHO: 11/8/22    Left ventricular systolic function is normal. Estimated left ventricular EF = 60%    Left ventricular wall thickness is consistent with borderline concentric hypertrophy.    Left ventricular diastolic function was normal.    Mild mitral and tricuspid regurgitation.    Calculated right ventricular systolic pressure from tricuspid regurgitation is 22 mmHg    Tele:  normal sinus    EKG:   Normal sinus rhythm  Cannot rule out Anterior infarct (cited on or before 19-AUG-2022)  Abnormal ECG  When compared with ECG of 20-JUL-2023 01:24, (Unconfirmed)  No significant change was found            Assessment and Plan:    Hypertension    -Associated secondary angina  -pt arrived with hypertensive urgency - placed on Cardene drip but that has been discontinued, pt is normotensive at time of visit  -beta-blocker held this am for  stress test protocol    CAD  -troponins trended and were <6  -currently chest pain free  -stress PET negative for ischemia.    Hyperlipidemia  -continue Lipitor        -Stable for discharge from a cardiac standpoint on current medical therapy.  Would recommend clonidine 0.1 mg p.o. as needed every 12 hours for blood pressures greater than 180/90 mmHg.          Electronically signed by Dianna Pacheco PA-C, 07/20/23, 9:13 AM EDT.        Please note that portions of this note were dictated utilizing Dragon dictation.

## 2023-07-31 ENCOUNTER — TRANSCRIBE ORDERS (OUTPATIENT)
Dept: ADMINISTRATIVE | Facility: HOSPITAL | Age: 48
End: 2023-07-31
Payer: COMMERCIAL

## 2023-07-31 DIAGNOSIS — Z12.31 VISIT FOR SCREENING MAMMOGRAM: Primary | ICD-10-CM

## 2023-08-08 ENCOUNTER — TELEPHONE (OUTPATIENT)
Dept: CARDIOLOGY | Facility: CLINIC | Age: 48
End: 2023-08-08
Payer: COMMERCIAL

## 2023-08-08 NOTE — TELEPHONE ENCOUNTER
kole     Caller: Rebekah Ga    Relationship to patient: Self    Best call back number: [phone]    Chief complaint:     Type of visit: FOLLOW UP    Requested date: ASAP     If rescheduling, when is the original appointment: 10.12.2023     Additional notes:PATIENT IS CONTINUALLY HAVING TO GO TO HER PCP FOR CHEST PAIN WHICH SHE HAS HAD SINCE JULY-(SHE WAS IN ED). THEN IN FEBRUARY IT WAS DEHYDRATION AT THAT TIME; NOW SHE IS BEING TOLD IT'S HIGH BLOOD PRESSURE. SHE WOULD LIKE TO GET IN SOONER WITH DR. RAMÍREZ. PLEASE CALL HER ASAP

## 2023-08-15 ENCOUNTER — OFFICE VISIT (OUTPATIENT)
Dept: CARDIOLOGY | Facility: CLINIC | Age: 48
End: 2023-08-15
Payer: COMMERCIAL

## 2023-08-15 VITALS
BODY MASS INDEX: 33.49 KG/M2 | OXYGEN SATURATION: 94 % | SYSTOLIC BLOOD PRESSURE: 132 MMHG | DIASTOLIC BLOOD PRESSURE: 80 MMHG | WEIGHT: 182 LBS | HEART RATE: 66 BPM | HEIGHT: 62 IN

## 2023-08-15 DIAGNOSIS — E78.2 MIXED HYPERLIPIDEMIA: ICD-10-CM

## 2023-08-15 DIAGNOSIS — I10 PRIMARY HYPERTENSION: ICD-10-CM

## 2023-08-15 DIAGNOSIS — I25.10 CORONARY ARTERY DISEASE INVOLVING NATIVE CORONARY ARTERY OF NATIVE HEART WITHOUT ANGINA PECTORIS: Primary | ICD-10-CM

## 2023-08-15 PROCEDURE — 99214 OFFICE O/P EST MOD 30 MIN: CPT | Performed by: INTERNAL MEDICINE

## 2023-08-15 RX ORDER — DILTIAZEM HYDROCHLORIDE 240 MG/1
240 CAPSULE, COATED, EXTENDED RELEASE ORAL DAILY
Qty: 30 CAPSULE | Refills: 11 | Status: SHIPPED | OUTPATIENT
Start: 2023-08-15

## 2023-08-15 NOTE — PROGRESS NOTES
[facility] Cardiology    Patient ID: Rebekah Ga is a 48 y.o. female.  : 1975   Contact: [phone]    Encounter date: 08/15/2023    PCP: Jozef Cummings MD      Chief complaint:   Chief Complaint   Patient presents with    Coronary Artery Disease    Chest Pain       Problem List:  Coronary artery disease  S/p anterolateral and inferior STEMI, 2022  [facility] 22: 100% occluded proximal LAD, s/p YOBANY. Non obstructive disease noted in RCA and LCx.   Echo 22: EF 50%. No sig VHD.  Echo, 2022; EF 60%. Borderline concentric LVH. Mild MR and TR. RVSP 22 mmHg.   Stress test 23: Rest EF = 83% Stress EF = 82%. Myocardial perfusion imaging indicates a normal myocardial perfusion study with no evidence of ischemia.  Hypertension  Hyperlipidemia  Asthma  Anxiety  Depression    Allergies   Allergen Reactions    Aspirin Anaphylaxis     Patient tolerates ibuprofen    Penicillins Hives and Itching       Current Medications:    Current Outpatient Medications:     albuterol sulfate  (90 Base) MCG/ACT inhaler, Inhale 2 puffs 2 (Two) Times a Day As Needed for Shortness of Air., Disp: , Rfl:     atorvastatin (LIPITOR) 80 MG tablet, Take 1 tablet by mouth Every Night., Disp: 90 tablet, Rfl: 11    bisoprolol (ZEBeta) 10 MG tablet, Take 1 tablet by mouth Daily., Disp: 30 tablet, Rfl: 11    Cholecalciferol (Vitamin D3) 1.25 MG (33311 UT) capsule, Take 1 capsule by mouth 1 (One) Time Per Week., Disp: 4 capsule, Rfl: 5    clonazePAM (KlonoPIN) 0.5 MG tablet, Take 1 tablet by mouth Daily As Needed for panic, Disp: 15 tablet, Rfl: 0    cloNIDine (Catapres) 0.1 MG tablet, Take 1 tablet by mouth Every 12 (Twelve) Hours As Needed for High Blood Pressure (Take 1 tablet for blood pressure above 180/90)., Disp: 30 tablet, Rfl: 0    Creon 91043-272371 units capsule delayed-release particles capsule, , Disp: , Rfl:     cyclobenzaprine (FLEXERIL) 10 MG tablet, Take 1 tablet  by mouth 3 (Three) Times a Day As Needed for Muscle Spasms., Disp: , Rfl:     FLUoxetine (PROzac) 20 MG capsule, Daily., Disp: , Rfl:     losartan (Cozaar) 100 MG tablet, Take 1 tablet by mouth Daily., Disp: 90 tablet, Rfl: 3    methocarbamol (ROBAXIN) 750 MG tablet, Take one tablet, by mouth, every 8 hours, as needed, for muscle spasm/tightness., Disp: 60 tablet, Rfl: 0    nitroglycerin (NITROSTAT) 0.4 MG SL tablet, 1 under the tongue as needed for angina, may repeat q5mins for up three doses, Disp: 25 tablet, Rfl: 11    omeprazole (priLOSEC) 20 MG capsule, Take 1 capsule by mouth 2 (Two) Times a Day. Patient frequently only takes once daily, Disp: , Rfl:     prasugrel (EFFIENT) 10 MG tablet, TAKE ONE TABLET BY MOUTH DAILY, Disp: 90 tablet, Rfl: 3    ubrogepant (ubrogepant) 100 MG tablet, Take one as needed for headache, may repeat once in 2 hours, max 200 mg in 24 hours., Disp: 10 tablet, Rfl: 5    Fluticasone-Salmeterol (ADVAIR/WIXELA) 100-50 MCG/ACT DISKUS, Inhale 1 puff As Needed. (Patient not taking: Reported on 8/15/2023), Disp: , Rfl:     HPI    Rebekah Ga is a 48 y.o. female who presents today for 6 month follow up of coronary artery disease, and cardiac risk factors. Since last visit, she has been in the hospital for pain in the arms and chest. They gave her morphine, nitroglycerin, but pain showed no improvement. The doctors in the ER gave her fluids and that helped her pain. Patient reports experiencing edema. She tries to walk everyday, but upon exertion she has shortness of breath. Patient denies chest pain, orthopnea, palpitations, dizziness, and syncope.     The following portions of the patient's history were reviewed and updated as appropriate: allergies, current medications and problem list.    Pertinent positives as listed in the HPI.  All other systems reviewed are negative.         Vitals:    08/15/23 1444   BP: 132/80   BP Location: Left arm   Patient Position: Sitting   Cuff Size:  "Adult   Pulse: 66   SpO2: 94%   Weight: 82.6 kg (182 lb)   Height: 157.5 cm (62\")       Physical Exam:  General: Alert and oriented.  Neck: Jugular venous pressure is within normal limits. Carotids have normal upstrokes without bruits.   Cardiovascular: Heart has a nondisplaced focal PMI. Regular rate and rhythm. No murmur, gallop or rub.  Lungs: Clear, no rales or wheezes. Equal expansion is noted.   Extremities: Show no edema.  Skin: Warm and dry.  Neurologic: Nonfocal.     Diagnostic Data (reviewed with patient):  Lab Results   Component Value Date    GLUCOSE 131 (H) 07/20/2023    BUN 14 07/20/2023    CREATININE 0.64 07/20/2023    EGFRIFNONA 72 01/25/2019    EGFRIFAFRI 88 01/25/2019    BCR 21.9 07/20/2023     07/20/2023    K 4.0 07/20/2023     07/20/2023    CO2 21.0 (L) 07/20/2023    CALCIUM 9.0 07/20/2023    ALBUMIN 3.9 07/20/2023    ALKPHOS 113 07/20/2023    AST 35 (H) 07/20/2023    ALT 40 (H) 07/20/2023     Lab Results   Component Value Date    CHOL 300 (H) 08/19/2022    TRIG 57 08/19/2022    HDL 81 (H) 08/19/2022     (H) 08/19/2022      Lab Results   Component Value Date    WBC 12.90 (H) 07/20/2023    RBC 5.12 07/20/2023    HGB 15.3 07/20/2023    HCT 45.4 07/20/2023    MCV 88.7 07/20/2023     07/20/2023      Lab Results   Component Value Date    TSH 2.970 06/06/2023        Procedures    Advance Care Planning   ACP discussion was declined by the patient. Patient does not have an advance directive, declines further assistance.           Assessment:    ICD-10-CM ICD-9-CM   1. Coronary artery disease involving native coronary artery of native heart without angina pectoris  I25.10 414.01   2. Primary hypertension  I10 401.9   3. Mixed hyperlipidemia  E78.2 272.2         Plan:  Start diltiazem 240 mg daily for chest pain.  I suspect that her chest pain may be related to smooth muscle constriction.  Discontinue bisoprolol 10 mg.  Continue on atorvastatin 80 mg nightly for hyperlipidemia.  "   Continue on Effient 10 mg for antiplatelet therapy.   Continue all other current medications.  F/up in 2 months, sooner if needed.    Scribed for Jeane Goncalves MD by Hali Roberts. 8/15/2023 15:14 EDT    I Jeane Goncalves MD personally performed the services described in this documentation as scribed by the above individual in my presence, and it is both accurate and complete.    Jeane Goncalves MD, FACC

## 2023-09-05 ENCOUNTER — TELEPHONE (OUTPATIENT)
Dept: CARDIOLOGY | Facility: CLINIC | Age: 48
End: 2023-09-05
Payer: COMMERCIAL

## 2023-09-06 ENCOUNTER — TELEPHONE (OUTPATIENT)
Dept: CARDIOLOGY | Facility: CLINIC | Age: 48
End: 2023-09-06
Payer: COMMERCIAL

## 2023-09-06 RX ORDER — CLOPIDOGREL BISULFATE 75 MG/1
75 TABLET ORAL DAILY
Qty: 30 TABLET | Refills: 11 | Status: SHIPPED | OUTPATIENT
Start: 2023-09-06

## 2023-09-06 NOTE — TELEPHONE ENCOUNTER
Spoke with patient.  She is instructed to stop effient and switch to plavix.  I will send this to her pharmacy.  She is instructed to continue this through her colonscopy and after.  I have sent a letter to Dr. Giraldo about her colonscopy.  Patient asks what diuretic [facility] would like to start her on.  She is continuing to have lower extremity swelling.  I will discuss with [facility] and call her back.  She verbalizes understanding.

## 2023-09-12 ENCOUNTER — TELEPHONE (OUTPATIENT)
Dept: CARDIOLOGY | Facility: CLINIC | Age: 48
End: 2023-09-12
Payer: COMMERCIAL

## 2023-09-12 RX ORDER — HYDROCHLOROTHIAZIDE 25 MG/1
25 TABLET ORAL DAILY
Qty: 30 TABLET | Refills: 11 | Status: SHIPPED | OUTPATIENT
Start: 2023-09-12

## 2023-09-12 NOTE — TELEPHONE ENCOUNTER
Per [facility]-Patient can start HCTZ 25mg PO 0.5 to 1.0 tablet daily PRN for lower extremity edema.    Unable to leave message/voice mailbox full.  Will try again later.

## 2023-09-13 ENCOUNTER — TELEPHONE (OUTPATIENT)
Dept: CARDIOLOGY | Facility: CLINIC | Age: 48
End: 2023-09-13
Payer: COMMERCIAL

## 2023-10-12 ENCOUNTER — OFFICE VISIT (OUTPATIENT)
Dept: CARDIOLOGY | Facility: CLINIC | Age: 48
End: 2023-10-12
Payer: COMMERCIAL

## 2023-10-12 VITALS
DIASTOLIC BLOOD PRESSURE: 84 MMHG | WEIGHT: 172 LBS | BODY MASS INDEX: 31.65 KG/M2 | HEART RATE: 74 BPM | OXYGEN SATURATION: 97 % | SYSTOLIC BLOOD PRESSURE: 124 MMHG | HEIGHT: 62 IN

## 2023-10-12 DIAGNOSIS — E78.2 MIXED HYPERLIPIDEMIA: ICD-10-CM

## 2023-10-12 DIAGNOSIS — I25.10 CORONARY ARTERY DISEASE INVOLVING NATIVE CORONARY ARTERY OF NATIVE HEART WITHOUT ANGINA PECTORIS: Primary | ICD-10-CM

## 2023-10-12 DIAGNOSIS — I10 PRIMARY HYPERTENSION: ICD-10-CM

## 2023-10-12 PROCEDURE — 99214 OFFICE O/P EST MOD 30 MIN: CPT | Performed by: INTERNAL MEDICINE

## 2023-10-12 NOTE — PROGRESS NOTES
[facility] Cardiology    Patient ID: Rebekah Ga is a 48 y.o. female.  : 1975   Contact: [phone]    Encounter date: 10/12/2023    PCP: Jozef Cummings MD      Chief complaint:   Chief Complaint   Patient presents with    Hypertension       Problem List:  Coronary artery disease  S/p anterolateral and inferior STEMI, 2022  [facility] 22: 100% occluded proximal LAD, s/p YOBANY. Non obstructive disease noted in RCA and LCx.   Echo 22: EF 50%. No sig VHD.  Echo, 2022; EF 60%. Borderline concentric LVH. Mild MR and TR. RVSP 22 mmHg.   Stress test 23: Rest EF = 83% Stress EF = 82%. Myocardial perfusion imaging indicates a normal myocardial perfusion study with no evidence of ischemia.  Hypertension  Hyperlipidemia  Asthma  Anxiety  Depression    Allergies   Allergen Reactions    Aspirin Anaphylaxis     Patient tolerates ibuprofen    Penicillins Hives and Itching       Current Medications:    Current Outpatient Medications:     albuterol sulfate  (90 Base) MCG/ACT inhaler, Inhale 2 puffs 2 (Two) Times a Day As Needed for Shortness of Air., Disp: , Rfl:     atorvastatin (LIPITOR) 80 MG tablet, Take 1 tablet by mouth Every Night., Disp: 90 tablet, Rfl: 11    Cholecalciferol (Vitamin D3) 1.25 MG (88862 UT) capsule, Take 1 capsule by mouth 1 (One) Time Per Week., Disp: 4 capsule, Rfl: 5    clonazePAM (KlonoPIN) 0.5 MG tablet, Take 1 tablet by mouth Daily As Needed for panic, Disp: 15 tablet, Rfl: 0    cloNIDine (Catapres) 0.1 MG tablet, Take 1 tablet by mouth Every 12 (Twelve) Hours As Needed for High Blood Pressure (Take 1 tablet for blood pressure above 180/90)., Disp: 30 tablet, Rfl: 0    clopidogrel (PLAVIX) 75 MG tablet, Take 1 tablet by mouth Daily., Disp: 30 tablet, Rfl: 11    Creon 83528-563286 units capsule delayed-release particles capsule, , Disp: , Rfl:     dilTIAZem CD (CARDIZEM CD) 240 MG 24 hr capsule, Take 1 capsule by mouth  Daily., Disp: 30 capsule, Rfl: 11    FLUoxetine (PROzac) 20 MG capsule, Daily., Disp: , Rfl:     Fluticasone-Salmeterol (ADVAIR/WIXELA) 100-50 MCG/ACT DISKUS, Inhale 1 puff As Needed., Disp: , Rfl:     hydroCHLOROthiazide (HYDRODIURIL) 25 MG tablet, Take 1 tablet by mouth Daily. Patient can take 1/2 to 1 tablet daily PO PRN lower extremity edema., Disp: 30 tablet, Rfl: 11    losartan (Cozaar) 100 MG tablet, Take 1 tablet by mouth Daily., Disp: 90 tablet, Rfl: 3    nitroglycerin (NITROSTAT) 0.4 MG SL tablet, 1 under the tongue as needed for angina, may repeat q5mins for up three doses, Disp: 25 tablet, Rfl: 11    ubrogepant (ubrogepant) 100 MG tablet, Take one as needed for headache, may repeat once in 2 hours, max 200 mg in 24 hours., Disp: 10 tablet, Rfl: 5    cyclobenzaprine (FLEXERIL) 10 MG tablet, Take 1 tablet by mouth 3 (Three) Times a Day As Needed for Muscle Spasms. (Patient not taking: Reported on 10/12/2023), Disp: , Rfl:     methocarbamol (ROBAXIN) 750 MG tablet, Take one tablet, by mouth, every 8 hours, as needed, for muscle spasm/tightness. (Patient not taking: Reported on 10/12/2023), Disp: 60 tablet, Rfl: 0    omeprazole (priLOSEC) 20 MG capsule, Take 1 capsule by mouth 2 (Two) Times a Day. Patient frequently only takes once daily (Patient not taking: Reported on 10/12/2023), Disp: , Rfl:     HPI    Rebekah Ga is a 48 y.o. female who presents today for a 2 month follow up of CAD and cardiac risk factors. Since last visit, patient has been doing well overall from a cardiovascular standpoint. She stays busy and active by walking a few miles daily with her friends. Patient reports a couple episodes of chest pain that she attributes to indigestion that she did not have to take nitroglycerin to resolve. She states she had a colonoscopy and an endoscopy this past Monday. Patient denies chest pain, shortness of breath, orthopnea, palpitations, edema, dizziness, and syncope.             The following  "portions of the patient's history were reviewed and updated as appropriate: allergies, current medications and problem list.    Pertinent positives as listed in the HPI.  All other systems reviewed are negative.         Vitals:    10/12/23 1531   BP: 124/84   BP Location: Left arm   Patient Position: Sitting   Pulse: 74   SpO2: 97%   Weight: 78 kg (172 lb)   Height: 157.5 cm (62.01\")       Physical Exam:  General: Alert and oriented.  Neck: Jugular venous pressure is within normal limits. Carotids have normal upstrokes without bruits.   Cardiovascular: Heart has a nondisplaced focal PMI. Regular rate and rhythm. No murmur, gallop or rub.  Lungs: Clear, no rales or wheezes. Equal expansion is noted.   Extremities: Show no edema.  Skin: Warm and dry.  Neurologic: Nonfocal.     Diagnostic Data (reviewed with patient):  Lab Results   Component Value Date    GLUCOSE 131 (H) 07/20/2023    BUN 14 07/20/2023    CREATININE 0.64 07/20/2023    BCR 21.9 07/20/2023     07/20/2023    K 4.0 07/20/2023     07/20/2023    CO2 21.0 (L) 07/20/2023    CALCIUM 9.0 07/20/2023    ALBUMIN 3.9 07/20/2023    ALKPHOS 113 07/20/2023    AST 35 (H) 07/20/2023    ALT 40 (H) 07/20/2023     Lab Results   Component Value Date    WBC 12.90 (H) 07/20/2023    RBC 5.12 07/20/2023    HGB 15.3 07/20/2023    HCT 45.4 07/20/2023    MCV 88.7 07/20/2023     07/20/2023      Lab Results   Component Value Date    TSH 2.970 06/06/2023      Lab date: 10/2023  FLP: , HDL 55, LDL 76           Procedures      Assessment:    ICD-10-CM ICD-9-CM   1. Coronary artery disease involving native coronary artery of native heart without angina pectoris  I25.10 414.01   2. Primary hypertension  I10 401.9   3. Mixed hyperlipidemia  E78.2 272.2         Plan:  Patient was encouraged to continue to be active and have a healthy diet.  Continue on atorvastatin 80 mg daily for hyperlipidemia.   Continue on Plavix 75 mg daily for antiplatelet therapy. Never " stop.  Continue on diltiazem 240 mg daily for rate control and hypertension.   Continue on hydrochlorothiazide 25 mg daily for hypertension.  Continue on losartan 100 mg daily for hypertension.   Continue on nitroglycerin 0.4 mg PRN for chest pain.  Continue all other current medications.  F/up in 6 months, sooner if needed.      Scribed for Jeane Goncalves MD by Lico Smith. 10/12/2023 15:44 EDT    I Jeane Goncalves MD personally performed the services described in this documentation as scribed by the above individual in my presence, and it is both accurate and complete.    Jeane Goncalves MD, FACC

## 2023-10-20 RX ORDER — CLONIDINE HYDROCHLORIDE 0.1 MG/1
0.1 TABLET ORAL EVERY 12 HOURS PRN
Qty: 30 TABLET | Refills: 2 | Status: SHIPPED | OUTPATIENT
Start: 2023-10-20

## 2023-10-20 RX ORDER — ATORVASTATIN CALCIUM 80 MG/1
80 TABLET, FILM COATED ORAL NIGHTLY
Qty: 90 TABLET | Refills: 3 | Status: SHIPPED | OUTPATIENT
Start: 2023-10-20

## 2023-12-04 RX ORDER — CLONIDINE HYDROCHLORIDE 0.1 MG/1
TABLET ORAL
Qty: 30 TABLET | Refills: 4 | Status: SHIPPED | OUTPATIENT
Start: 2023-12-04

## 2023-12-04 NOTE — TELEPHONE ENCOUNTER
Lab Results   Component Value Date    GLUCOSE 131 (H) 07/20/2023    BUN 14 07/20/2023    CREATININE 0.64 07/20/2023    EGFR 107.8 07/20/2023    BCR 21.9 07/20/2023    K 4.0 07/20/2023    CO2 21.0 (L) 07/20/2023    CALCIUM 9.0 07/20/2023    ALBUMIN 3.9 07/20/2023    BILITOT 0.4 07/20/2023    AST 35 (H) 07/20/2023    ALT 40 (H) 07/20/2023

## 2024-03-06 RX ORDER — CLONIDINE HYDROCHLORIDE 0.1 MG/1
TABLET ORAL
Qty: 30 TABLET | Refills: 4 | Status: SHIPPED | OUTPATIENT
Start: 2024-03-06

## 2024-06-06 ENCOUNTER — OFFICE VISIT (OUTPATIENT)
Dept: ENDOCRINOLOGY | Facility: CLINIC | Age: 49
End: 2024-06-06
Payer: COMMERCIAL

## 2024-06-06 VITALS
OXYGEN SATURATION: 98 % | HEIGHT: 62 IN | WEIGHT: 172 LBS | HEART RATE: 73 BPM | DIASTOLIC BLOOD PRESSURE: 78 MMHG | BODY MASS INDEX: 31.65 KG/M2 | SYSTOLIC BLOOD PRESSURE: 120 MMHG

## 2024-06-06 DIAGNOSIS — E04.9 GOITER: Primary | ICD-10-CM

## 2024-06-06 LAB
T4 FREE SERPL-MCNC: 1.07 NG/DL (ref 0.92–1.68)
TSH SERPL DL<=0.05 MIU/L-ACNC: 3.27 UIU/ML (ref 0.27–4.2)

## 2024-06-06 PROCEDURE — 84439 ASSAY OF FREE THYROXINE: CPT | Performed by: INTERNAL MEDICINE

## 2024-06-06 PROCEDURE — 84443 ASSAY THYROID STIM HORMONE: CPT | Performed by: INTERNAL MEDICINE

## 2024-06-06 NOTE — PROGRESS NOTES
"     Office Note      Date: 2024  Patient Name: Rebekah Ga  MRN: [id number]  : 1975    Chief Complaint   Patient presents with    Goiter       History of Present Illness:   Rebekah Ga is a 49 y.o. female who presents for Goiter  .   Current rx:NONE     Changes in history:NONE   Questions /problems:NONE     Subjective          Review of Systems:   Review of Systems   Constitutional:  Negative for fatigue and unexpected weight change.   HENT:  Negative for trouble swallowing and voice change.    Respiratory:  Negative for choking.    Cardiovascular:  Negative for palpitations.   Neurological:  Positive for tremors.   Psychiatric/Behavioral:  Positive for sleep disturbance.        The following portions of the patient's history were reviewed and updated as appropriate: allergies, current medications, past family history, past medical history, past social history, past surgical history, and problem list.    Objective     Visit Vitals  /78   Pulse 73   Ht 157.5 cm (62\")   Wt 78 kg (172 lb)   SpO2 98%   BMI 31.46 kg/m²           Physical Exam:  Physical Exam  Vitals reviewed.   Constitutional:       Appearance: Normal appearance.   Neck:      Comments: NO PALPABLE THYROID ABNORMALITY   Lymphadenopathy:      Cervical: No cervical adenopathy.   Neurological:      Mental Status: She is alert.   Psychiatric:         Mood and Affect: Mood normal.         Behavior: Behavior normal.         Thought Content: Thought content normal.         Judgment: Judgment normal.         Assessment / Plan      Assessment & Plan:  Problem List Items Addressed This Visit       Goiter - Primary    Overview     Remote hx of graves disease. Treated with ptu and methimazole and rendered euthyroid / had 2.3 cm left sided trad 3 nodule in   /////////////////////////////////////////////////////////////////////////////////24  Procedure: thyroid ultrasound  Indication: hx of nodule  Results- the thyroid is " normal in size and echgenicity                There is a 2.3 cm trad 3 nodule on the left          Current Assessment & Plan     Left sided nodule is stable    Plan : review thyroid leels            Recheck labs and us in 1 year         Relevant Orders    TSH    T4, Free    US Thyroid (Completed)        Electronically signed by : Andrew Burns MD   06/06/2024

## 2024-06-06 NOTE — ASSESSMENT & PLAN NOTE
Left sided nodule is stable    Plan : review thyroid leels            Recheck labs and us in 1 year

## 2024-06-17 RX ORDER — LOSARTAN POTASSIUM 100 MG/1
100 TABLET ORAL DAILY
Qty: 90 TABLET | Refills: 3 | Status: SHIPPED | OUTPATIENT
Start: 2024-06-17

## 2024-07-03 ENCOUNTER — OFFICE VISIT (OUTPATIENT)
Dept: CARDIOLOGY | Facility: CLINIC | Age: 49
End: 2024-07-03
Payer: COMMERCIAL

## 2024-07-03 ENCOUNTER — LAB (OUTPATIENT)
Dept: LAB | Facility: HOSPITAL | Age: 49
End: 2024-07-03
Payer: COMMERCIAL

## 2024-07-03 VITALS
OXYGEN SATURATION: 97 % | BODY MASS INDEX: 33.31 KG/M2 | DIASTOLIC BLOOD PRESSURE: 98 MMHG | HEIGHT: 62 IN | SYSTOLIC BLOOD PRESSURE: 148 MMHG | HEART RATE: 92 BPM | WEIGHT: 181 LBS

## 2024-07-03 DIAGNOSIS — I25.10 CORONARY ARTERY DISEASE INVOLVING NATIVE CORONARY ARTERY OF NATIVE HEART WITHOUT ANGINA PECTORIS: ICD-10-CM

## 2024-07-03 DIAGNOSIS — E78.2 MIXED HYPERLIPIDEMIA: ICD-10-CM

## 2024-07-03 DIAGNOSIS — I10 PRIMARY HYPERTENSION: ICD-10-CM

## 2024-07-03 DIAGNOSIS — I25.10 CORONARY ARTERY DISEASE INVOLVING NATIVE CORONARY ARTERY OF NATIVE HEART WITHOUT ANGINA PECTORIS: Primary | ICD-10-CM

## 2024-07-03 LAB
ALBUMIN SERPL-MCNC: 4.4 G/DL (ref 3.5–5.2)
ALBUMIN/GLOB SERPL: 1.5 G/DL
ALP SERPL-CCNC: 97 U/L (ref 39–117)
ALT SERPL W P-5'-P-CCNC: 43 U/L (ref 1–33)
ANION GAP SERPL CALCULATED.3IONS-SCNC: 12 MMOL/L (ref 5–15)
AST SERPL-CCNC: 30 U/L (ref 1–32)
BILIRUB SERPL-MCNC: 1 MG/DL (ref 0–1.2)
BUN SERPL-MCNC: 11 MG/DL (ref 6–20)
BUN/CREAT SERPL: 11.1 (ref 7–25)
CALCIUM SPEC-SCNC: 10.4 MG/DL (ref 8.6–10.5)
CHLORIDE SERPL-SCNC: 105 MMOL/L (ref 98–107)
CHOLEST SERPL-MCNC: 126 MG/DL (ref 0–200)
CO2 SERPL-SCNC: 23 MMOL/L (ref 22–29)
CREAT SERPL-MCNC: 0.99 MG/DL (ref 0.57–1)
DEPRECATED RDW RBC AUTO: 42.3 FL (ref 37–54)
EGFRCR SERPLBLD CKD-EPI 2021: 69.2 ML/MIN/1.73
ERYTHROCYTE [DISTWIDTH] IN BLOOD BY AUTOMATED COUNT: 13 % (ref 12.3–15.4)
GLOBULIN UR ELPH-MCNC: 2.9 GM/DL
GLUCOSE SERPL-MCNC: 98 MG/DL (ref 65–99)
HCT VFR BLD AUTO: 48.1 % (ref 34–46.6)
HDLC SERPL-MCNC: 58 MG/DL (ref 40–60)
HGB BLD-MCNC: 16.5 G/DL (ref 12–15.9)
LDLC SERPL CALC-MCNC: 54 MG/DL (ref 0–100)
LDLC/HDLC SERPL: 0.93 {RATIO}
MCH RBC QN AUTO: 30.6 PG (ref 26.6–33)
MCHC RBC AUTO-ENTMCNC: 34.3 G/DL (ref 31.5–35.7)
MCV RBC AUTO: 89.2 FL (ref 79–97)
PLATELET # BLD AUTO: 423 10*3/MM3 (ref 140–450)
PMV BLD AUTO: 9.7 FL (ref 6–12)
POTASSIUM SERPL-SCNC: 4 MMOL/L (ref 3.5–5.2)
PROT SERPL-MCNC: 7.3 G/DL (ref 6–8.5)
RBC # BLD AUTO: 5.39 10*6/MM3 (ref 3.77–5.28)
SODIUM SERPL-SCNC: 140 MMOL/L (ref 136–145)
TRIGL SERPL-MCNC: 71 MG/DL (ref 0–150)
VLDLC SERPL-MCNC: 14 MG/DL (ref 5–40)
WBC NRBC COR # BLD AUTO: 10.86 10*3/MM3 (ref 3.4–10.8)

## 2024-07-03 PROCEDURE — 36415 COLL VENOUS BLD VENIPUNCTURE: CPT

## 2024-07-03 PROCEDURE — 80061 LIPID PANEL: CPT

## 2024-07-03 PROCEDURE — 85027 COMPLETE CBC AUTOMATED: CPT

## 2024-07-03 PROCEDURE — 99214 OFFICE O/P EST MOD 30 MIN: CPT | Performed by: PHYSICIAN ASSISTANT

## 2024-07-03 PROCEDURE — 80053 COMPREHEN METABOLIC PANEL: CPT

## 2024-07-03 RX ORDER — LISDEXAMFETAMINE DIMESYLATE 30 MG/1
CAPSULE ORAL DAILY
COMMUNITY
Start: 2024-06-20

## 2024-07-03 NOTE — PROGRESS NOTES
[facility] Cardiology    Patient ID: Rebekah Ga is a 49 y.o. female.  : 1975   Contact: [phone]    Encounter date: 2024    PCP: Jozef Cummings MD      Chief complaint:   Chief Complaint   Patient presents with    Coronary artery disease involving native coronary artery of       Problem List:  Coronary artery disease  S/p anterolateral and inferior STEMI, 2022  [facility] 22: 100% occluded proximal LAD, s/p YOBANY. Non obstructive disease noted in RCA and LCx.   Echo 22: EF 50%. No sig VHD.  Echo, 2022; EF 60%. Borderline concentric LVH. Mild MR and TR. RVSP 22 mmHg.   Stress test 23: Rest EF = 83% Stress EF = 82%. Myocardial perfusion imaging indicates a normal myocardial perfusion study with no evidence of ischemia.  Hypertension  Hyperlipidemia  Asthma  Anxiety  Depression    Allergies   Allergen Reactions    Aspirin Anaphylaxis     Patient tolerates ibuprofen    Penicillins Hives and Itching       Current Medications:    Current Outpatient Medications:     albuterol sulfate  (90 Base) MCG/ACT inhaler, Inhale 2 puffs 2 (Two) Times a Day As Needed for Shortness of Air., Disp: , Rfl:     atorvastatin (LIPITOR) 80 MG tablet, Take 1 tablet by mouth Every Night., Disp: 90 tablet, Rfl: 3    Cholecalciferol (Vitamin D3) 1.25 MG (60866 UT) capsule, Take 1 capsule by mouth 1 (One) Time Per Week., Disp: 4 capsule, Rfl: 5    clonazePAM (KlonoPIN) 0.5 MG tablet, Take 1 tablet by mouth Daily As Needed for panic, Disp: 15 tablet, Rfl: 0    cloNIDine (CATAPRES) 0.1 MG tablet, TAKE ONE TABLET BY MOUTH EVERY 12 HOURS AS NEEDED FOR HIGH BLOOD PRESSURE ABOVE 180/90, Disp: 30 tablet, Rfl: 4    clopidogrel (PLAVIX) 75 MG tablet, Take 1 tablet by mouth Daily., Disp: 30 tablet, Rfl: 11    dilTIAZem CD (CARDIZEM CD) 240 MG 24 hr capsule, Take 1 capsule by mouth Daily., Disp: 30 capsule, Rfl: 11    ezetimibe (ZETIA) 10 MG tablet, Take 1 tablet by  mouth Daily., Disp: 30 tablet, Rfl: 11    FLUoxetine (PROzac) 20 MG capsule, Daily., Disp: , Rfl:     hydroCHLOROthiazide (HYDRODIURIL) 25 MG tablet, Take 1 tablet by mouth Daily. Patient can take 1/2 to 1 tablet daily PO PRN lower extremity edema. (Patient taking differently: Take 1 tablet by mouth Daily As Needed (For Swelling). Patient can take 1/2 to 1 tablet daily PO PRN lower extremity edema.), Disp: 30 tablet, Rfl: 11    lisdexamfetamine (VYVANSE) 30 MG capsule, Take by mouth Daily, Disp: , Rfl:     losartan (COZAAR) 100 MG tablet, TAKE ONE TABLET BY MOUTH DAILY, Disp: 90 tablet, Rfl: 3    nitroglycerin (NITROSTAT) 0.4 MG SL tablet, 1 under the tongue as needed for angina, may repeat q5mins for up three doses, Disp: 25 tablet, Rfl: 11    ubrogepant (ubrogepant) 100 MG tablet, Take one as needed for headache, may repeat once in 2 hours, max 200 mg in 24 hours., Disp: 10 tablet, Rfl: 5    Creon 57272-907808 units capsule delayed-release particles capsule, , Disp: , Rfl:     cyclobenzaprine (FLEXERIL) 10 MG tablet, Take 1 tablet by mouth 3 (Three) Times a Day As Needed for Muscle Spasms. (Patient not taking: Reported on 7/3/2024), Disp: , Rfl:     Fluticasone-Salmeterol (ADVAIR/WIXELA) 100-50 MCG/ACT DISKUS, Inhale 1 puff As Needed. (Patient not taking: Reported on 7/3/2024), Disp: , Rfl:     omeprazole (priLOSEC) 20 MG capsule, Take 1 capsule by mouth 2 (Two) Times a Day. Patient frequently only takes once daily (Patient not taking: Reported on 7/3/2024), Disp: , Rfl:     HPI    Rebekah Ga is a 49 y.o. female who presents today for a follow up of CAD and cardiac risk factors. Since last visit, patient overall has been doing well.  She is done exercise and lifestyle changes and is feeling better although she has not lost a significant amount of weight.  She is able to check her blood pressure at home but does not do this regularly.  She has not had recent blood work to check her cholesterol although it  "has been ordered at her last visit.  She has had no recurrent anginal symptoms with chest pain or shortness of breath.  Most of her pain was through to her back with her prior MI.  She denies any lower extremity edema or swelling.      The following portions of the patient's history were reviewed and updated as appropriate: allergies, current medications and problem list.    Pertinent positives as listed in the HPI.  All other systems reviewed are negative.         Vitals:    07/03/24 1353 07/03/24 1408   BP: 150/98 148/98   BP Location: Left arm Left arm   Patient Position: Sitting Sitting   Cuff Size: Adult    Pulse: 92    SpO2: 97%    Weight: 82.1 kg (181 lb)    Height: 157.5 cm (62.01\")        Physical Exam:  General: Alert and oriented.  Neck: Jugular venous pressure is within normal limits. Carotids have normal upstrokes without bruits.   Cardiovascular: Regular rate and rhythm. No murmur, gallop or rub.  Lungs: Clear, no rales or wheezes. Equal expansion is noted.   Extremities: No peripheral edema  Skin: Warm and dry.  Neurologic: Nonfocal.     Diagnostic Data (reviewed with patient):  Lab Results   Component Value Date    GLUCOSE 131 (H) 07/20/2023    BUN 14 07/20/2023    CREATININE 0.64 07/20/2023    BCR 21.9 07/20/2023     07/20/2023    K 4.0 07/20/2023     07/20/2023    CO2 21.0 (L) 07/20/2023    CALCIUM 9.0 07/20/2023    ALBUMIN 3.9 07/20/2023    ALKPHOS 113 07/20/2023    AST 35 (H) 07/20/2023    ALT 40 (H) 07/20/2023     Lab Results   Component Value Date    WBC 12.90 (H) 07/20/2023    RBC 5.12 07/20/2023    HGB 15.3 07/20/2023    HCT 45.4 07/20/2023    MCV 88.7 07/20/2023     07/20/2023      Lab Results   Component Value Date    TSH 3.270 06/06/2024                   Assessment:    ICD-10-CM ICD-9-CM   1. Coronary artery disease involving native coronary artery of native heart without angina pectoris  I25.10 414.01   2. Primary hypertension  I10 401.9   3. Mixed hyperlipidemia  " E78.2 272.2         Plan:  Blood pressure slightly elevated today.  Patient will check her blood pressure at home and if it remains over 140 systolic she is to contact us for adjustments to her medication.  Continue on atorvastatin 80 mg and Zetia 10 mg daily for hyperlipidemia.   Continue on Plavix 75 mg daily for antiplatelet therapy indefinitely after STEMI.   Continue on diltiazem 240 mg daily and losartan 100 mg daily for hypertension.   Continue on hydrochlorothiazide 25 mg daily as needed for hypertension.   Continue on nitroglycerin 0.4 mg PRN for chest pain.  Continue all other current medications.  F/up in 12 months, sooner if needed.    Mona Barnett PA-C

## 2024-07-29 ENCOUNTER — TRANSCRIBE ORDERS (OUTPATIENT)
Dept: LAB | Facility: HOSPITAL | Age: 49
End: 2024-07-29
Payer: COMMERCIAL

## 2024-07-29 ENCOUNTER — LAB (OUTPATIENT)
Dept: LAB | Facility: HOSPITAL | Age: 49
End: 2024-07-29
Payer: COMMERCIAL

## 2024-07-29 DIAGNOSIS — Z01.419 ROUTINE GYNECOLOGICAL EXAMINATION: ICD-10-CM

## 2024-07-29 DIAGNOSIS — Z01.419 ROUTINE GYNECOLOGICAL EXAMINATION: Primary | ICD-10-CM

## 2024-07-29 LAB
ESTRADIOL SERPL HS-MCNC: 5.4 PG/ML
FSH SERPL-ACNC: 52.2 MIU/ML

## 2024-07-29 PROCEDURE — 36415 COLL VENOUS BLD VENIPUNCTURE: CPT

## 2024-07-29 PROCEDURE — 82670 ASSAY OF TOTAL ESTRADIOL: CPT

## 2024-07-29 PROCEDURE — 83001 ASSAY OF GONADOTROPIN (FSH): CPT

## 2024-08-27 ENCOUNTER — HOSPITAL ENCOUNTER (OUTPATIENT)
Facility: HOSPITAL | Age: 49
Setting detail: OBSERVATION
Discharge: HOME | End: 2024-08-28
Attending: INTERNAL MEDICINE | Admitting: INTERNAL MEDICINE
Payer: COMMERCIAL

## 2024-08-27 PROBLEM — M54.2 NECK PAIN, ACUTE: Status: RESOLVED | Noted: 2022-06-06 | Resolved: 2024-08-27

## 2024-08-27 PROBLEM — I21.3 ST ELEVATION MYOCARDIAL INFARCTION (STEMI), UNSPECIFIED ARTERY: Status: RESOLVED | Noted: 2022-08-19 | Resolved: 2024-08-27

## 2024-08-27 PROBLEM — E87.6 HYPOKALEMIA: Status: ACTIVE | Noted: 2024-08-27

## 2024-08-27 PROBLEM — R51.9 ACUTE INTRACTABLE HEADACHE: Status: ACTIVE | Noted: 2024-08-27

## 2024-08-27 PROBLEM — I16.0 HYPERTENSIVE URGENCY: Status: ACTIVE | Noted: 2022-06-06

## 2024-08-27 PROBLEM — R07.89 CHEST PAIN, ATYPICAL: Status: ACTIVE | Noted: 2024-08-27

## 2024-08-27 PROCEDURE — 99222 1ST HOSP IP/OBS MODERATE 55: CPT | Performed by: NURSE PRACTITIONER

## 2024-08-27 PROCEDURE — 87637 SARSCOV2&INF A&B&RSV AMP PRB: CPT | Performed by: NURSE PRACTITIONER

## 2024-08-27 PROCEDURE — 93005 ELECTROCARDIOGRAM TRACING: CPT | Performed by: NURSE PRACTITIONER

## 2024-08-27 PROCEDURE — G0378 HOSPITAL OBSERVATION PER HR: HCPCS

## 2024-08-27 PROCEDURE — 85025 COMPLETE CBC W/AUTO DIFF WBC: CPT | Performed by: NURSE PRACTITIONER

## 2024-08-27 PROCEDURE — G0379 DIRECT REFER HOSPITAL OBSERV: HCPCS

## 2024-08-27 PROCEDURE — 83036 HEMOGLOBIN GLYCOSYLATED A1C: CPT | Performed by: NURSE PRACTITIONER

## 2024-08-27 RX ORDER — ATORVASTATIN CALCIUM 40 MG/1
80 TABLET, FILM COATED ORAL NIGHTLY
Status: DISCONTINUED | OUTPATIENT
Start: 2024-08-28 | End: 2024-08-28 | Stop reason: HOSPADM

## 2024-08-27 RX ORDER — NITROGLYCERIN 0.4 MG/1
0.4 TABLET SUBLINGUAL
Status: DISCONTINUED | OUTPATIENT
Start: 2024-08-27 | End: 2024-08-28 | Stop reason: HOSPADM

## 2024-08-27 RX ORDER — AMOXICILLIN 250 MG
2 CAPSULE ORAL 2 TIMES DAILY PRN
Status: DISCONTINUED | OUTPATIENT
Start: 2024-08-27 | End: 2024-08-28 | Stop reason: HOSPADM

## 2024-08-27 RX ORDER — ACETAMINOPHEN 160 MG/5ML
650 SOLUTION ORAL EVERY 4 HOURS PRN
Status: DISCONTINUED | OUTPATIENT
Start: 2024-08-27 | End: 2024-08-28 | Stop reason: HOSPADM

## 2024-08-27 RX ORDER — HYDROCHLOROTHIAZIDE 25 MG/1
25 TABLET ORAL DAILY
Status: DISCONTINUED | OUTPATIENT
Start: 2024-08-28 | End: 2024-08-28 | Stop reason: HOSPADM

## 2024-08-27 RX ORDER — CALCIUM CARBONATE 500 MG/1
2 TABLET, CHEWABLE ORAL 2 TIMES DAILY PRN
Status: DISCONTINUED | OUTPATIENT
Start: 2024-08-27 | End: 2024-08-28 | Stop reason: HOSPADM

## 2024-08-27 RX ORDER — LOSARTAN POTASSIUM 50 MG/1
100 TABLET ORAL DAILY
Status: DISCONTINUED | OUTPATIENT
Start: 2024-08-28 | End: 2024-08-28 | Stop reason: HOSPADM

## 2024-08-27 RX ORDER — ACETAMINOPHEN 650 MG/1
650 SUPPOSITORY RECTAL EVERY 4 HOURS PRN
Status: DISCONTINUED | OUTPATIENT
Start: 2024-08-27 | End: 2024-08-28 | Stop reason: HOSPADM

## 2024-08-27 RX ORDER — BISACODYL 10 MG
10 SUPPOSITORY, RECTAL RECTAL DAILY PRN
Status: DISCONTINUED | OUTPATIENT
Start: 2024-08-27 | End: 2024-08-28 | Stop reason: HOSPADM

## 2024-08-27 RX ORDER — DILTIAZEM HYDROCHLORIDE 240 MG/1
240 CAPSULE, COATED, EXTENDED RELEASE ORAL DAILY
Status: DISCONTINUED | OUTPATIENT
Start: 2024-08-28 | End: 2024-08-28 | Stop reason: HOSPADM

## 2024-08-27 RX ORDER — SODIUM CHLORIDE 9 MG/ML
40 INJECTION, SOLUTION INTRAVENOUS AS NEEDED
Status: DISCONTINUED | OUTPATIENT
Start: 2024-08-27 | End: 2024-08-28 | Stop reason: HOSPADM

## 2024-08-27 RX ORDER — SODIUM CHLORIDE 0.9 % (FLUSH) 0.9 %
10 SYRINGE (ML) INJECTION EVERY 12 HOURS SCHEDULED
Status: DISCONTINUED | OUTPATIENT
Start: 2024-08-28 | End: 2024-08-28 | Stop reason: HOSPADM

## 2024-08-27 RX ORDER — BUTALBITAL, ACETAMINOPHEN AND CAFFEINE 50; 325; 40 MG/1; MG/1; MG/1
2 TABLET ORAL EVERY 4 HOURS PRN
Status: DISCONTINUED | OUTPATIENT
Start: 2024-08-27 | End: 2024-08-28 | Stop reason: HOSPADM

## 2024-08-27 RX ORDER — ONDANSETRON 4 MG/1
4 TABLET, ORALLY DISINTEGRATING ORAL EVERY 6 HOURS PRN
Status: DISCONTINUED | OUTPATIENT
Start: 2024-08-27 | End: 2024-08-28 | Stop reason: HOSPADM

## 2024-08-27 RX ORDER — POLYETHYLENE GLYCOL 3350 17 G/17G
17 POWDER, FOR SOLUTION ORAL DAILY PRN
Status: DISCONTINUED | OUTPATIENT
Start: 2024-08-27 | End: 2024-08-28 | Stop reason: HOSPADM

## 2024-08-27 RX ORDER — NITROGLYCERIN 20 MG/100ML
10-200 INJECTION INTRAVENOUS
Status: DISCONTINUED | OUTPATIENT
Start: 2024-08-27 | End: 2024-08-27

## 2024-08-27 RX ORDER — ACETAMINOPHEN 500 MG
1000 TABLET ORAL ONCE
Status: COMPLETED | OUTPATIENT
Start: 2024-08-27 | End: 2024-08-27

## 2024-08-27 RX ORDER — BISACODYL 5 MG/1
5 TABLET, DELAYED RELEASE ORAL DAILY PRN
Status: DISCONTINUED | OUTPATIENT
Start: 2024-08-27 | End: 2024-08-28 | Stop reason: HOSPADM

## 2024-08-27 RX ORDER — LABETALOL HYDROCHLORIDE 5 MG/ML
10 INJECTION, SOLUTION INTRAVENOUS EVERY 4 HOURS PRN
Status: DISCONTINUED | OUTPATIENT
Start: 2024-08-27 | End: 2024-08-28 | Stop reason: HOSPADM

## 2024-08-27 RX ORDER — SODIUM CHLORIDE 0.9 % (FLUSH) 0.9 %
10 SYRINGE (ML) INJECTION AS NEEDED
Status: DISCONTINUED | OUTPATIENT
Start: 2024-08-27 | End: 2024-08-28 | Stop reason: HOSPADM

## 2024-08-27 RX ORDER — KETOROLAC TROMETHAMINE 30 MG/ML
30 INJECTION, SOLUTION INTRAMUSCULAR; INTRAVENOUS ONCE AS NEEDED
Status: DISCONTINUED | OUTPATIENT
Start: 2024-08-27 | End: 2024-08-28 | Stop reason: HOSPADM

## 2024-08-27 RX ORDER — ENOXAPARIN SODIUM 100 MG/ML
40 INJECTION SUBCUTANEOUS DAILY
Status: DISCONTINUED | OUTPATIENT
Start: 2024-08-28 | End: 2024-08-28 | Stop reason: HOSPADM

## 2024-08-27 RX ORDER — ONDANSETRON 2 MG/ML
4 INJECTION INTRAMUSCULAR; INTRAVENOUS EVERY 6 HOURS PRN
Status: DISCONTINUED | OUTPATIENT
Start: 2024-08-27 | End: 2024-08-28 | Stop reason: HOSPADM

## 2024-08-27 RX ORDER — CYCLOBENZAPRINE HCL 10 MG
5 TABLET ORAL 3 TIMES DAILY PRN
Status: DISCONTINUED | OUTPATIENT
Start: 2024-08-27 | End: 2024-08-28 | Stop reason: HOSPADM

## 2024-08-27 RX ORDER — ACETAMINOPHEN 325 MG/1
650 TABLET ORAL EVERY 4 HOURS PRN
Status: DISCONTINUED | OUTPATIENT
Start: 2024-08-27 | End: 2024-08-28 | Stop reason: HOSPADM

## 2024-08-27 RX ORDER — CLOPIDOGREL BISULFATE 75 MG/1
75 TABLET ORAL DAILY
Status: DISCONTINUED | OUTPATIENT
Start: 2024-08-28 | End: 2024-08-28 | Stop reason: HOSPADM

## 2024-08-27 RX ADMIN — Medication 10 ML: at 23:44

## 2024-08-27 RX ADMIN — ACETAMINOPHEN 1000 MG: 500 TABLET ORAL at 22:46

## 2024-08-28 ENCOUNTER — APPOINTMENT (OUTPATIENT)
Dept: GENERAL RADIOLOGY | Facility: HOSPITAL | Age: 49
End: 2024-08-28
Payer: COMMERCIAL

## 2024-08-28 VITALS
HEIGHT: 62 IN | RESPIRATION RATE: 18 BRPM | DIASTOLIC BLOOD PRESSURE: 92 MMHG | BODY MASS INDEX: 31.69 KG/M2 | OXYGEN SATURATION: 94 % | HEART RATE: 94 BPM | TEMPERATURE: 98.5 F | SYSTOLIC BLOOD PRESSURE: 142 MMHG | WEIGHT: 172.2 LBS

## 2024-08-28 DIAGNOSIS — I25.10 CORONARY ARTERY DISEASE INVOLVING NATIVE HEART, UNSPECIFIED VESSEL OR LESION TYPE, UNSPECIFIED WHETHER ANGINA PRESENT: Primary | ICD-10-CM

## 2024-08-28 DIAGNOSIS — E78.2 MIXED HYPERLIPIDEMIA: ICD-10-CM

## 2024-08-28 PROBLEM — I16.0 HYPERTENSIVE URGENCY: Status: RESOLVED | Noted: 2022-06-06 | Resolved: 2024-08-28

## 2024-08-28 PROBLEM — R07.89 CHEST PAIN, ATYPICAL: Status: RESOLVED | Noted: 2024-08-27 | Resolved: 2024-08-28

## 2024-08-28 PROBLEM — R51.9 ACUTE INTRACTABLE HEADACHE: Status: RESOLVED | Noted: 2024-08-27 | Resolved: 2024-08-28

## 2024-08-28 PROBLEM — I16.0 HYPERTENSIVE URGENCY: Status: ACTIVE | Noted: 2024-08-28

## 2024-08-28 PROBLEM — E87.6 HYPOKALEMIA: Status: RESOLVED | Noted: 2024-08-27 | Resolved: 2024-08-28

## 2024-08-28 LAB
ALBUMIN SERPL-MCNC: 4.2 G/DL (ref 3.5–5.2)
ALBUMIN/GLOB SERPL: 1.4 G/DL
ALP SERPL-CCNC: 105 U/L (ref 39–117)
ALT SERPL W P-5'-P-CCNC: 51 U/L (ref 1–33)
ANION GAP SERPL CALCULATED.3IONS-SCNC: 14 MMOL/L (ref 5–15)
ANION GAP SERPL CALCULATED.3IONS-SCNC: 17 MMOL/L (ref 5–15)
AST SERPL-CCNC: 33 U/L (ref 1–32)
BACTERIA UR QL AUTO: ABNORMAL /HPF
BASOPHILS # BLD AUTO: 0.05 10*3/MM3 (ref 0–0.2)
BASOPHILS # BLD AUTO: 0.05 10*3/MM3 (ref 0–0.2)
BASOPHILS NFR BLD AUTO: 0.3 % (ref 0–1.5)
BASOPHILS NFR BLD AUTO: 0.3 % (ref 0–1.5)
BILIRUB SERPL-MCNC: 0.6 MG/DL (ref 0–1.2)
BILIRUB UR QL STRIP: NEGATIVE
BUN SERPL-MCNC: 10 MG/DL (ref 6–20)
BUN SERPL-MCNC: 10 MG/DL (ref 6–20)
BUN/CREAT SERPL: 11.9 (ref 7–25)
BUN/CREAT SERPL: 13 (ref 7–25)
CALCIUM SPEC-SCNC: 9.3 MG/DL (ref 8.6–10.5)
CALCIUM SPEC-SCNC: 9.7 MG/DL (ref 8.6–10.5)
CHLORIDE SERPL-SCNC: 103 MMOL/L (ref 98–107)
CHLORIDE SERPL-SCNC: 98 MMOL/L (ref 98–107)
CLARITY UR: CLEAR
CO2 SERPL-SCNC: 20 MMOL/L (ref 22–29)
CO2 SERPL-SCNC: 22 MMOL/L (ref 22–29)
COLOR UR: YELLOW
CREAT SERPL-MCNC: 0.77 MG/DL (ref 0.57–1)
CREAT SERPL-MCNC: 0.84 MG/DL (ref 0.57–1)
D-LACTATE SERPL-SCNC: 1.8 MMOL/L (ref 0.5–2)
D-LACTATE SERPL-SCNC: 4.4 MMOL/L (ref 0.5–2)
DEPRECATED RDW RBC AUTO: 40.8 FL (ref 37–54)
DEPRECATED RDW RBC AUTO: 41.1 FL (ref 37–54)
EGFRCR SERPLBLD CKD-EPI 2021: 84.3 ML/MIN/1.73
EGFRCR SERPLBLD CKD-EPI 2021: 93.5 ML/MIN/1.73
EOSINOPHIL # BLD AUTO: 0 10*3/MM3 (ref 0–0.4)
EOSINOPHIL # BLD AUTO: 0.07 10*3/MM3 (ref 0–0.4)
EOSINOPHIL NFR BLD AUTO: 0 % (ref 0.3–6.2)
EOSINOPHIL NFR BLD AUTO: 0.5 % (ref 0.3–6.2)
ERYTHROCYTE [DISTWIDTH] IN BLOOD BY AUTOMATED COUNT: 12.5 % (ref 12.3–15.4)
ERYTHROCYTE [DISTWIDTH] IN BLOOD BY AUTOMATED COUNT: 12.5 % (ref 12.3–15.4)
FLUAV RNA RESP QL NAA+PROBE: NOT DETECTED
FLUBV RNA RESP QL NAA+PROBE: NOT DETECTED
GEN 5 2HR TROPONIN T REFLEX: <6 NG/L
GLOBULIN UR ELPH-MCNC: 3 GM/DL
GLUCOSE SERPL-MCNC: 145 MG/DL (ref 65–99)
GLUCOSE SERPL-MCNC: 216 MG/DL (ref 65–99)
GLUCOSE UR STRIP-MCNC: ABNORMAL MG/DL
HAV IGM SERPL QL IA: NORMAL
HBA1C MFR BLD: 6.4 % (ref 4.8–5.6)
HBV CORE IGM SERPL QL IA: NORMAL
HBV SURFACE AG SERPL QL IA: NORMAL
HCT VFR BLD AUTO: 46.3 % (ref 34–46.6)
HCT VFR BLD AUTO: 46.6 % (ref 34–46.6)
HCV AB SER QL: NORMAL
HGB BLD-MCNC: 15.9 G/DL (ref 12–15.9)
HGB BLD-MCNC: 16 G/DL (ref 12–15.9)
HGB UR QL STRIP.AUTO: ABNORMAL
HYALINE CASTS UR QL AUTO: ABNORMAL /LPF
IMM GRANULOCYTES # BLD AUTO: 0.05 10*3/MM3 (ref 0–0.05)
IMM GRANULOCYTES # BLD AUTO: 0.06 10*3/MM3 (ref 0–0.05)
IMM GRANULOCYTES NFR BLD AUTO: 0.3 % (ref 0–0.5)
IMM GRANULOCYTES NFR BLD AUTO: 0.4 % (ref 0–0.5)
KETONES UR QL STRIP: NEGATIVE
LEUKOCYTE ESTERASE UR QL STRIP.AUTO: ABNORMAL
LYMPHOCYTES # BLD AUTO: 1.79 10*3/MM3 (ref 0.7–3.1)
LYMPHOCYTES # BLD AUTO: 3.65 10*3/MM3 (ref 0.7–3.1)
LYMPHOCYTES NFR BLD AUTO: 10.8 % (ref 19.6–45.3)
LYMPHOCYTES NFR BLD AUTO: 24.3 % (ref 19.6–45.3)
MAGNESIUM SERPL-MCNC: 2.2 MG/DL (ref 1.6–2.6)
MCH RBC QN AUTO: 30.3 PG (ref 26.6–33)
MCH RBC QN AUTO: 30.5 PG (ref 26.6–33)
MCHC RBC AUTO-ENTMCNC: 34.1 G/DL (ref 31.5–35.7)
MCHC RBC AUTO-ENTMCNC: 34.6 G/DL (ref 31.5–35.7)
MCV RBC AUTO: 88.4 FL (ref 79–97)
MCV RBC AUTO: 88.9 FL (ref 79–97)
MONOCYTES # BLD AUTO: 0.58 10*3/MM3 (ref 0.1–0.9)
MONOCYTES # BLD AUTO: 1.16 10*3/MM3 (ref 0.1–0.9)
MONOCYTES NFR BLD AUTO: 3.5 % (ref 5–12)
MONOCYTES NFR BLD AUTO: 7.7 % (ref 5–12)
NEUTROPHILS NFR BLD AUTO: 10.04 10*3/MM3 (ref 1.7–7)
NEUTROPHILS NFR BLD AUTO: 14.15 10*3/MM3 (ref 1.7–7)
NEUTROPHILS NFR BLD AUTO: 66.9 % (ref 42.7–76)
NEUTROPHILS NFR BLD AUTO: 85 % (ref 42.7–76)
NITRITE UR QL STRIP: NEGATIVE
NRBC BLD AUTO-RTO: 0 /100 WBC (ref 0–0.2)
NRBC BLD AUTO-RTO: 0 /100 WBC (ref 0–0.2)
PH UR STRIP.AUTO: 6 [PH] (ref 5–8)
PLATELET # BLD AUTO: 386 10*3/MM3 (ref 140–450)
PLATELET # BLD AUTO: 414 10*3/MM3 (ref 140–450)
PMV BLD AUTO: 9.6 FL (ref 6–12)
PMV BLD AUTO: 9.7 FL (ref 6–12)
POTASSIUM SERPL-SCNC: 3.7 MMOL/L (ref 3.5–5.2)
POTASSIUM SERPL-SCNC: 4.1 MMOL/L (ref 3.5–5.2)
PROCALCITONIN SERPL-MCNC: 0.05 NG/ML (ref 0–0.25)
PROT SERPL-MCNC: 7.2 G/DL (ref 6–8.5)
PROT UR QL STRIP: NEGATIVE
RBC # BLD AUTO: 5.24 10*6/MM3 (ref 3.77–5.28)
RBC # BLD AUTO: 5.24 10*6/MM3 (ref 3.77–5.28)
RBC # UR STRIP: ABNORMAL /HPF
REF LAB TEST METHOD: ABNORMAL
RSV RNA RESP QL NAA+PROBE: NOT DETECTED
SARS-COV-2 RNA RESP QL NAA+PROBE: NOT DETECTED
SODIUM SERPL-SCNC: 135 MMOL/L (ref 136–145)
SODIUM SERPL-SCNC: 139 MMOL/L (ref 136–145)
SP GR UR STRIP: <=1.005 (ref 1–1.03)
SQUAMOUS #/AREA URNS HPF: ABNORMAL /HPF
TROPONIN T DELTA: NORMAL
TROPONIN T SERPL HS-MCNC: <6 NG/L
UROBILINOGEN UR QL STRIP: ABNORMAL
WBC # UR STRIP: ABNORMAL /HPF
WBC NRBC COR # BLD AUTO: 15.02 10*3/MM3 (ref 3.4–10.8)
WBC NRBC COR # BLD AUTO: 16.63 10*3/MM3 (ref 3.4–10.8)

## 2024-08-28 PROCEDURE — 25810000003 SODIUM CHLORIDE 0.9 % SOLUTION: Performed by: NURSE PRACTITIONER

## 2024-08-28 PROCEDURE — 25010000002 ENOXAPARIN PER 10 MG

## 2024-08-28 PROCEDURE — 80053 COMPREHEN METABOLIC PANEL: CPT | Performed by: NURSE PRACTITIONER

## 2024-08-28 PROCEDURE — 85025 COMPLETE CBC W/AUTO DIFF WBC: CPT | Performed by: NURSE PRACTITIONER

## 2024-08-28 PROCEDURE — 87040 BLOOD CULTURE FOR BACTERIA: CPT | Performed by: NURSE PRACTITIONER

## 2024-08-28 PROCEDURE — 99239 HOSP IP/OBS DSCHRG MGMT >30: CPT | Performed by: INTERNAL MEDICINE

## 2024-08-28 PROCEDURE — 84484 ASSAY OF TROPONIN QUANT: CPT | Performed by: NURSE PRACTITIONER

## 2024-08-28 PROCEDURE — G0378 HOSPITAL OBSERVATION PER HR: HCPCS

## 2024-08-28 PROCEDURE — 71045 X-RAY EXAM CHEST 1 VIEW: CPT

## 2024-08-28 PROCEDURE — 81001 URINALYSIS AUTO W/SCOPE: CPT | Performed by: NURSE PRACTITIONER

## 2024-08-28 PROCEDURE — 80074 ACUTE HEPATITIS PANEL: CPT | Performed by: NURSE PRACTITIONER

## 2024-08-28 PROCEDURE — 83735 ASSAY OF MAGNESIUM: CPT | Performed by: NURSE PRACTITIONER

## 2024-08-28 PROCEDURE — 83605 ASSAY OF LACTIC ACID: CPT | Performed by: NURSE PRACTITIONER

## 2024-08-28 PROCEDURE — 84145 PROCALCITONIN (PCT): CPT | Performed by: NURSE PRACTITIONER

## 2024-08-28 PROCEDURE — 96372 THER/PROPH/DIAG INJ SC/IM: CPT

## 2024-08-28 RX ADMIN — DILTIAZEM HYDROCHLORIDE 240 MG: 240 CAPSULE, COATED, EXTENDED RELEASE ORAL at 09:00

## 2024-08-28 RX ADMIN — FLUOXETINE HYDROCHLORIDE 20 MG: 20 CAPSULE ORAL at 09:00

## 2024-08-28 RX ADMIN — SODIUM CHLORIDE 1000 ML: 9 INJECTION, SOLUTION INTRAVENOUS at 03:24

## 2024-08-28 RX ADMIN — ACETAMINOPHEN 650 MG: 325 TABLET ORAL at 09:00

## 2024-08-28 RX ADMIN — LOSARTAN POTASSIUM 100 MG: 50 TABLET, FILM COATED ORAL at 09:00

## 2024-08-28 RX ADMIN — ENOXAPARIN SODIUM 40 MG: 100 INJECTION SUBCUTANEOUS at 09:00

## 2024-08-28 RX ADMIN — CLOPIDOGREL BISULFATE 75 MG: 75 TABLET ORAL at 09:00

## 2024-08-28 RX ADMIN — Medication 10 ML: at 09:01

## 2024-08-28 RX ADMIN — HYDROCHLOROTHIAZIDE 25 MG: 25 TABLET ORAL at 09:00

## 2024-08-28 NOTE — H&P
[facility] Medicine Services  HISTORY AND PHYSICAL    Patient Name: Rebekah Ga  : 1975  MRN: [id number]  Primary Care Physician: Jozef Cummings MD  Date of admission: 2024    Subjective   Subjective     Chief Complaint:  Chest pain / elevated blood pressure    HPI:  Rebekah Ga is a 49 y.o. female with PMHx of CAD w/ 100% occluded proximal LAD s/p YOBANY (2022) and non obstructive disease noted in RCA and LCx (f/w cardiology, Dr. Goncalves), HTN, HLD, asthma, anxiety, depression, migraine headaches who presented to OSH ([place]) for evaluation of chest pain and elevated blood pressure.    Earlier today, noticed her left arm feeling sore and began aching in her chest, this moved to her right shoulder as well and felt like she had lifted something heavy or had been working out; describes the sensation as similar to when she had her heart attack in  so she presented to her PCP who sent her to the ED. /125 per her report. She says she has been complaint with her home medications. The shoulder pain has extended up into her neck and she has had a migraine headache as well, this worsened after she was given nitro at the OSH. She was also given zofran and dilaudid which she says has made her groggy and relieved her chest pain but her headache still persists. Typically at home she would take Aleve Headache for mild pain and her Ulbrevy for a significant headache. She has not had to take either of these recently.     Currently her chest pain is improved. She continues to have shoulder pain into her neck and having a severe headache she describes as pain up the back of her neck and across her forehead with photosensitivity. Mild nausea or vomiting. Asking to eat to see if this helps her headache.    Labs reviewed from OSH: Troponin x 2 negative (@ 1423 = 4; @ 1620 = 4),   WBC 10.9, H/H 17.4/50.8, , , POT 3.4, , CREAT 0.8, AST   41, ALT  76, . EKG w/ NSR. Chest XR normal. BP documented on arrival to /106. Given NTP @ 1417, Dilaudid 1 mg / Zofran 4 mg @ 1557.       Review of Systems   Eyes:  Positive for photophobia.   Respiratory:  Negative for shortness of breath.    Cardiovascular:  Positive for chest pain. Negative for palpitations and leg swelling.   Gastrointestinal:  Positive for nausea. Negative for vomiting.   Musculoskeletal:  Positive for arthralgias and neck pain. Negative for neck stiffness.   Neurological:  Positive for headaches.   All other systems reviewed and are negative.               Personal History     Past Medical History:   Diagnosis Date    ADHD     Anxiety     Bipolar affective     CAD (coronary artery disease) 7/20/2023    Depression     Graves disease 1994?    Hypertension     Hyperthyroidism 1994?    Hypothyroidism 2002?    Migraine     Mixed hyperlipidemia 7/3/2024    Myocardial infarction 08/19/2022    Thyroid nodule 2022    Vitamin D deficiency 2010             Past Surgical History:   Procedure Laterality Date    CARDIAC CATHETERIZATION N/A 08/19/2022    Procedure: Left Heart Cath;  Surgeon: Jeane Goncalves MD;  Location: [facility] INVASIVE LOCATION;  Service: Cardiovascular;  Laterality: N/A;    PANCREAS SURGERY  5/28/2013       Family History:  family history includes Breast cancer (age of onset: 76) in her mother; Cancer in her brother and father.     Social History:  reports that she has never smoked. She has never been exposed to tobacco smoke. She has never used smokeless tobacco. She reports that she does not currently use alcohol after a past usage of about 1.0 standard drink of alcohol per week. She reports that she does not currently use drugs.  Social History     Social History Narrative    Not on file       Medications:  FLUoxetine, Vitamin D3, albuterol sulfate HFA, atorvastatin, cloNIDine, clonazePAM, clopidogrel, dilTIAZem CD, ezetimibe, hydroCHLOROthiazide,  lisdexamfetamine, losartan, nitroglycerin, and ubrogepant    Allergies   Allergen Reactions    Aspirin Anaphylaxis     Patient tolerates  Ibuprofen and Aleve    Penicillins Hives and Itching       Objective   Objective     Vital Signs:   Temp:  [97.9 °F (36.6 °C)-98.2 °F (36.8 °C)] 98.2 °F (36.8 °C)  Heart Rate:  [79-84] 81  Resp:  [18] 18  BP: (160-202)/() 161/95    Physical Exam  Constitutional:       General: She is not in acute distress.     Appearance: She is well-developed. She is ill-appearing. She is not toxic-appearing.   HENT:      Head: Normocephalic and atraumatic.      Nose: Nose normal.      Mouth/Throat:      Pharynx: Oropharynx is clear.   Eyes:      Extraocular Movements: Extraocular movements intact.      Conjunctiva/sclera: Conjunctivae normal.      Pupils: Pupils are equal, round, and reactive to light.   Cardiovascular:      Rate and Rhythm: Normal rate and regular rhythm.      Pulses: Normal pulses.      Heart sounds: Normal heart sounds. No murmur heard.  Pulmonary:      Effort: Pulmonary effort is normal. No respiratory distress.      Breath sounds: Normal breath sounds.   Abdominal:      General: Bowel sounds are normal. There is no distension.      Palpations: Abdomen is soft.      Tenderness: There is no abdominal tenderness.   Musculoskeletal:         General: No swelling. Normal range of motion.      Cervical back: Normal range of motion and neck supple.   Skin:     General: Skin is warm and dry.      Capillary Refill: Capillary refill takes less than 2 seconds.      Findings: No rash.   Neurological:      Mental Status: She is alert and oriented to person, place, and time.      Cranial Nerves: No cranial nerve deficit.   Psychiatric:         Mood and Affect: Mood normal.         Behavior: Behavior normal.            Result Review:  I have personally reviewed the results from the time of this admission to 8/27/2024 23:37 EDT and agree with these findings:  []  Laboratory list /  accordion  []  Microbiology  []  Radiology  []  EKG/Telemetry   []  Cardiology/Vascular   []  Pathology  []  Old records  []  Other:  Most notable findings include:     LAB RESULTS:                              Brief Urine Lab Results       None          Microbiology Results (last 10 days)       ** No results found for the last 240 hours. **            No radiology results from the last 24 hrs    Results for orders placed during the hospital encounter of 11/08/22    Adult Transthoracic Echo Complete W/ Cont if Necessary Per Protocol    Interpretation Summary    Left ventricular systolic function is normal. Estimated left ventricular EF = 60%    Left ventricular wall thickness is consistent with borderline concentric hypertrophy.    Left ventricular diastolic function was normal.    Mild mitral and tricuspid regurgitation.    Calculated right ventricular systolic pressure from tricuspid regurgitation is 22 mmHg.      Assessment & Plan   Assessment & Plan       Hypertensive urgency    Bipolar affective    CAD (coronary artery disease)    Mixed hyperlipidemia    Chest pain, atypical    Acute intractable headache    Hypokalemia    Hypertensive urgency  Chest pain  Hx CAD w/ stent  HTN/ HLD  - troponin at OSH neg x 2  - repeat troponin now  - EKG now  -  currently  - continue plavix, has aspirin allergy  - resume home BP meds (amiodarone, hctz, losartan)  - hold vyvannse  - labetalol PRN for SBP > 160  - cardiology consult in am, f/w Dr. Goncalves  - CP likely exacerbated by elevated BP, control BP  - has stress PET 7/20/2023 w/ normal LVEF and low risk study    Acute intractable headache / shoulder pain  Hx migraine headaches  - tylenol 1 gram now  - toradol 30 mg IV x 1 if tylenol not effective  - fiorcet Q 4 hours prn headache/migraine  - ice pack to head/neck  - check flu/covid swab  - likely exacerbated by elevated BP, control BP    Hypokalemia  - repeat BMP, mag now  - replace electrolytes    Elevated  LFT's  - repeat CMP in am  - check acute hepatitis panel    Bipolar disorder / ADHD  - continue fluoxetine  - hold Vyvannse     DVT prophylaxis:  Lovenox    CODE STATUS:    Code Status (Patient has no pulse and is not breathing): CPR (Attempt to Resuscitate)  Medical Interventions (Patient has pulse or is breathing): Full Support      Expected Discharge    Expected Discharge Date: 8/28/2024; Expected Discharge Time:     Signature: Electronically signed by SHORTY Coats, 08/27/24, 11:36 PM EDT    Total time spent: 60 minutes  Time spent includes time reviewing chart, face-to-face time, counseling patient/family/caregiver, ordering medications/tests/procedures, communicating with other health care professionals, documenting clinical information in the electronic health record, and coordination of care.

## 2024-08-28 NOTE — DISCHARGE SUMMARY
[facility] Medicine Services  DISCHARGE SUMMARY    Patient Name: Rebekah Ga  : 1975  MRN: [id number]    Date of Admission: 2024  9:02 PM  Date of Discharge:  2024  Primary Care Physician: Jozef Cummings MD    Consults       Date and Time Order Name Status Description    2024 11:02 PM Inpatient Cardiology Consult              Hospital Course     Presenting Problem: HTN    Active Hospital Problems    Diagnosis  POA    Mixed hyperlipidemia [E78.2]  Yes    CAD (coronary artery disease) [I25.10]  Yes    Bipolar affective [F31.9]  Yes      Resolved Hospital Problems    Diagnosis Date Resolved POA    **Hypertensive urgency [I16.0] 2024 Yes    Chest pain, atypical [R07.89] 2024 Yes    Acute intractable headache [R51.9] 2024 Yes    Hypokalemia [E87.6] 2024 Yes          Hospital Course:  Rebekah Ga is a 49 y.o. female with PMHx of CAD w/ 100% occluded proximal LAD s/p YOBANY (2022) and non obstructive disease noted in RCA and LCx (f/w cardiology, Dr. Goncalves), HTN, HLD, asthma, anxiety, depression, migraine headaches who presented to OSH ([place]) for evaluation of chest pain and elevated blood pressure.     Hypertensive emergency w/ chest pain, resolved  Hx CAD w/ stent  HTN/ HLD  - Patient seen by cardiology during stay. Her troponin x 2 was neg and EKG without ischemic changes. Her BP became controlled without intervention. She was resumed on her home bp meds with excellent control. Cardiology rec'd nuclear stress test to be performed as outpt. She will follow up with Dr. Goncalves in 1-2 weeks.    Discharge Follow Up Recommendations for outpatient labs/diagnostics:   Dr. Goncalves 1-2 weeks    Day of Discharge     HPI:  Up in bed. CP free. BP controlled. Asking if she can go home and back to work.    Review of Systems  Gen- No fevers, chills  CV- No chest pain, palpitations  Resp- No cough, dyspnea  GI- No  N/V/D, abd pain    Vital Signs:   Temp:  [97.9 °F (36.6 °C)-98.5 °F (36.9 °C)] 98.5 °F (36.9 °C)  Heart Rate:  [62-88] 79  Resp:  [18-20] 18  BP: (140-202)/() 142/92      Physical Exam:  Constitutional: No acute distress, awake, alert  HENT: NCAT, mucous membranes moist  Respiratory: Clear to auscultation bilaterally, respiratory effort normal   Cardiovascular: RRR, no murmurs, rubs, or gallops  Gastrointestinal: Positive bowel sounds, soft, nontender, nondistended  Musculoskeletal: No bilateral ankle edema  Psychiatric: Appropriate affect, cooperative  Neurologic: Oriented x 3, strength symmetric in all extremities, Cranial Nerves grossly intact to confrontation, speech clear  Skin: No rashes     Pertinent  and/or Most Recent Results     LAB RESULTS:      Lab 08/28/24  0340 08/28/24  0111 08/28/24 0049 08/27/24  2346   WBC 15.02*  --   --  16.63*   HEMOGLOBIN 15.9  --   --  16.0*   HEMATOCRIT 46.6  --   --  46.3   PLATELETS 386  --   --  414   NEUTROS ABS 10.04*  --   --  14.15*   IMMATURE GRANS (ABS) 0.05  --   --  0.06*   LYMPHS ABS 3.65*  --   --  1.79   MONOS ABS 1.16*  --   --  0.58   EOS ABS 0.07  --   --  0.00   MCV 88.9  --   --  88.4   PROCALCITONIN  --   --  0.05  --    LACTATE 1.8 4.4*  --   --          Lab 08/28/24  0340 08/28/24  0049 08/27/24  2346   SODIUM 139 135*  --    POTASSIUM 3.7 4.1  --    CHLORIDE 103 98  --    CO2 22.0 20.0*  --    ANION GAP 14.0 17.0*  --    BUN 10 10  --    CREATININE 0.77 0.84  --    EGFR 93.5 84.3  --    GLUCOSE 145* 216*  --    CALCIUM 9.3 9.7  --    MAGNESIUM  --  2.2  --    HEMOGLOBIN A1C  --   --  6.40*         Lab 08/28/24  0340   TOTAL PROTEIN 7.2   ALBUMIN 4.2   GLOBULIN 3.0   ALT (SGPT) 51*   AST (SGOT) 33*   BILIRUBIN 0.6   ALK PHOS 105         Lab 08/28/24  0340 08/28/24  0049   HSTROP T <6 <6                 Brief Urine Lab Results  (Last result in the past 365 days)        Color   Clarity   Blood   Leuk Est   Nitrite   Protein   CREAT   Urine HCG         08/28/24 0121 Yellow   Clear   Moderate (2+)   Trace   Negative   Negative                 Microbiology Results (last 10 days)       Procedure Component Value - Date/Time    COVID-19, FLU A/B, RSV PCR 1 HR TAT - Swab, Nasopharynx [[id number]]  (Normal) Collected: 08/27/24 2346    Lab Status: Final result Specimen: Swab from Nasopharynx Updated: 08/28/24 0036     COVID19 Not Detected     Influenza A PCR Not Detected     Influenza B PCR Not Detected     RSV, PCR Not Detected    Narrative:      Fact sheet for providers: https://www.fda.gov/media/760911/download    Fact sheet for patients: https://www.fda.gov/media/843097/download    Test performed by PCR.            XR Chest 1 View    Result Date: 8/28/2024  XR CHEST 1 VW Date of Exam: 8/28/2024 4:16 AM EDT Indication: leukocytosis Comparison: 7/20/2023. Findings: There are no airspace consolidations. No pleural fluid. No pneumothorax. The pulmonary vasculature appears within normal limits. The cardiac and mediastinal silhouette appear unremarkable. No acute osseous abnormality identified.     Impression: No acute cardiopulmonary process. Electronically Signed: Isabel Delgado MD  8/28/2024 4:49 AM EDT  Workstation ID: [id number]             Results for orders placed during the hospital encounter of 11/08/22    Adult Transthoracic Echo Complete W/ Cont if Necessary Per Protocol    Interpretation Summary    Left ventricular systolic function is normal. Estimated left ventricular EF = 60%    Left ventricular wall thickness is consistent with borderline concentric hypertrophy.    Left ventricular diastolic function was normal.    Mild mitral and tricuspid regurgitation.    Calculated right ventricular systolic pressure from tricuspid regurgitation is 22 mmHg.      Plan for Follow-up of Pending Labs/Results:   Pending Labs       Order Current Status    Blood Culture - Blood, Arm, Right In process    Blood Culture - Blood, Hand, Left In process          Discharge Details         Discharge Medications        Continue These Medications        Instructions Start Date   albuterol sulfate  (90 Base) MCG/ACT inhaler  Commonly known as: PROVENTIL HFA;VENTOLIN HFA;PROAIR HFA   2 puffs, Inhalation, 2 Times Daily PRN      atorvastatin 80 MG tablet  Commonly known as: LIPITOR   80 mg, Oral, Nightly      clonazePAM 0.5 MG tablet  Commonly known as: KlonoPIN   Take 1 tablet by mouth Daily As Needed for panic      cloNIDine 0.1 MG tablet  Commonly known as: CATAPRES   TAKE ONE TABLET BY MOUTH EVERY 12 HOURS AS NEEDED FOR HIGH BLOOD PRESSURE ABOVE 180/90      clopidogrel 75 MG tablet  Commonly known as: PLAVIX   75 mg, Oral, Daily      dilTIAZem  MG 24 hr capsule  Commonly known as: CARDIZEM CD   240 mg, Oral, Daily      ezetimibe 10 MG tablet  Commonly known as: ZETIA   10 mg, Oral, Daily      FLUoxetine 20 MG capsule  Commonly known as: PROzac   Daily      hydroCHLOROthiazide 25 MG tablet   25 mg, Oral, Daily, Patient can take 1/2 to 1 tablet daily PO PRN lower extremity edema.      lisdexamfetamine 30 MG capsule  Commonly known as: VYVANSE   Oral, Daily      losartan 100 MG tablet  Commonly known as: COZAAR   100 mg, Oral, Daily      nitroglycerin 0.4 MG SL tablet  Commonly known as: NITROSTAT   1 under the tongue as needed for angina, may repeat q5mins for up three doses      Ubrelvy 100 MG tablet  Generic drug: ubrogepant   Take one as needed for headache, may repeat once in 2 hours, max 200 mg in 24 hours.      Vitamin D3 1.25 MG (19948 UT) capsule   50,000 Units, Oral, Weekly               Allergies   Allergen Reactions    Aspirin Anaphylaxis     Patient tolerates  Ibuprofen and Aleve    Penicillins Hives and Itching         Discharge Disposition:  Home or Self Care    Diet:  Hospital:  Diet Order   Procedures    Diet: Cardiac; Healthy Heart (2-3 Na+); Fluid Consistency: Thin (IDDSI 0)            Activity:      Restrictions or Other Recommendations:         CODE STATUS:    Code  Status and Medical Interventions: CPR (Attempt to Resuscitate); Full Support   Ordered at: 08/27/24 2302     Code Status (Patient has no pulse and is not breathing):    CPR (Attempt to Resuscitate)     Medical Interventions (Patient has pulse or is breathing):    Full Support       Future Appointments   Date Time Provider Department Center   6/6/2025 11:30 AM Andrew Burns [place] [facility] [place] [facility]       Additional Instructions for the Follow-ups that You Need to Schedule       Discharge Follow-up with Specified Provider: Dr. Goncalves for nuc stress; 2 Weeks   As directed      To: Dr. Goncalves for nuc stress   Follow Up: 2 Weeks                      Eneida Guerra II, DO  08/28/24      Time Spent on Discharge:  I spent  34  minutes on this discharge activity which included: face-to-face encounter with the patient, reviewing the data in the system, coordination of the care with the nursing staff as well as consultants, documentation, and entering orders.

## 2024-08-28 NOTE — PROGRESS NOTES
Pharmacist to dose Enoxaparin  DVT Prophylaxis           78 kg   BMI = 31.49    Plan: Enoxaparin 40 mg subcut Q24H     Jr Molina formerly [facility]  8/27/24 23:30

## 2024-08-28 NOTE — CONSULTS
[facility] Cardiology Consult Note      Referring Provider: Provider, No Known  Primary Provider:  Jozef Cummings MD  Reason for Consultation: Bilateral arm pain      Chief complaint bilateral arm pain    Identification: 49-year-old female    Problem list:    Coronary artery disease  S/p anterolateral and inferior STEMI, 8/19/2022  [facility] 8/19/22: 100% occluded proximal LAD, s/p YOABNY. Non obstructive disease noted in RCA and LCx.   Echo 8/20/22: EF 50%. No sig VHD.  Echo, 11/08/2022; EF 60%. Borderline concentric LVH. Mild MR and TR. RVSP 22 mmHg.   Stress test 07/20/23: Rest EF = 83% Stress EF = 82%. Myocardial perfusion imaging indicates a normal myocardial perfusion study with no evidence of ischemia.  Hypertension  Hyperlipidemia  Asthma  Anxiety  Depression    Allergies:  Aspirin and Penicillins    Home/Current Medications:          History of present illness: Rebekah was transferred from [facility] after she was seen for bilateral arm pain which began yesterday morning.  It all started while she was at a staff meeting and she was sitting against a bookcase feeling somewhat lightheaded and dizzy.  She started drinking water as she felt that she probably not had enough to drink that morning and then stood up and she felt okay.  She then noticed that she had heaviness in both of her shoulders radiating down to her arm slightly greater on the right than the left.  She had someone check her blood pressure and it was elevated.  She then went to the emergency room at [facility] and was found to have elevated blood pressure as well.  Troponins were negative and EKG was negative but it was felt that she should be transferred as she was still having some discomfort in her shoulders.   She is currently pain-free.  She did eat breakfast this morning.  Her blood pressure at home typically runs 1 13-1 36 systolic.  She walked 2 days ago for exercise with no problems at all.    Cardiac Risk Factors:  "Known coronary artery disease, hypertension, hyperlipidemia    Social History: Never smoked.  Has 1 alcoholic drink per week.  Does not use illicit drugs.  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    Family History: Breast cancer at age 86 and her mother.  Cancer in her brother and father.     Review of Systems  Pertinent positives are listed in the HPI.  All other systems reviewed are negative.         Objective     Vital Sign Min/Max for last 24 hours  Temp  Min: 97.9 °F (36.6 °C)  Max: 98.5 °F (36.9 °C)   BP  Min: 140/91  Max: 202/123   Pulse  Min: 62  Max: 88   Resp  Min: 18  Max: 20   SpO2  Min: 92 %  Max: 97 %   No data recorded   Weight  Min: 78.1 kg (172 lb 3.2 oz)  Max: 78.1 kg (172 lb 3.2 oz)     Flowsheet Rows      Flowsheet Row First Filed Value   Admission Height 157.5 cm (62.01\") Documented at 08/27/2024 2104   Admission Weight 78.1 kg (172 lb 3.2 oz) Documented at 08/27/2024 2104            Physical Exam:    General: Well-developed well-nourished female.  No acute distress.  HEENT: Sclera anicteric.  Oropharynx clear and moist.  CV: Regular rate and rhythm with no murmur gallop or rub heard.  The PMI is nondisplaced.  Pedal pulses are excellent.  Resp: A rare expiratory wheeze is heard on the right.  No rales or rhonchi.  Equal expansion is noted.  GI: Abdomen soft and nontender  : deferred  Musculoskeletal: No gross joint deformities are noted  Skin: Warm and dry  Neurologic: Nonfocal  Psychiatric: Normal mood and affect.     EKG: Normal sinus rhythm.  74 bpm.  No acute changes.  No change from 7/20/2023.    Labs:      Results from last 7 days   Lab Units 08/28/24  0340 08/28/24  0049   SODIUM mmol/L 139 135*   POTASSIUM mmol/L 3.7 4.1   CHLORIDE mmol/L 103 98   CO2 mmol/L 22.0 20.0*   BUN mg/dL 10 10   CREATININE mg/dL 0.77 0.84   CALCIUM mg/dL 9.3 9.7   BILIRUBIN mg/dL 0.6  --    ALK PHOS U/L 105  --    ALT (SGPT) U/L 51*  --    AST (SGOT) U/L 33*  --  "   GLUCOSE mg/dL 145* 216*     @LABRCNTIP(wbc:3,hgb:3,hct:3,PLT:3,NEUTOPHILPCT:3;LYMPHOPCT:3,MONOPCT  )  Lab Results (last 24 hours)       Procedure Component Value Units Date/Time    Hemoglobin A1c [[id number]]  (Abnormal) Collected: 08/27/24 2346    Specimen: Blood Updated: 08/28/24 0456     Hemoglobin A1C 6.40 %     Narrative:      Hemoglobin A1C Ranges:    Increased Risk for Diabetes  5.7% to 6.4%  Diabetes                     >= 6.5%  Diabetic Goal                < 7.0%    Hepatitis Panel, Acute [[id number]]  (Normal) Collected: 08/28/24 0340    Specimen: Blood Updated: 08/28/24 0428     Hepatitis B Surface Ag Non-Reactive     Hep A IgM Non-Reactive     Hep B C IgM Non-Reactive     Hepatitis C Ab Non-Reactive    Narrative:      Results may be falsely decreased if patient taking Biotin.     Comprehensive Metabolic Panel [[id number]]  (Abnormal) Collected: 08/28/24 0340    Specimen: Blood Updated: 08/28/24 0422     Glucose 145 mg/dL      BUN 10 mg/dL      Creatinine 0.77 mg/dL      Sodium 139 mmol/L      Potassium 3.7 mmol/L      Chloride 103 mmol/L      CO2 22.0 mmol/L      Calcium 9.3 mg/dL      Total Protein 7.2 g/dL      Albumin 4.2 g/dL      ALT (SGPT) 51 U/L      AST (SGOT) 33 U/L      Alkaline Phosphatase 105 U/L      Total Bilirubin 0.6 mg/dL      Globulin 3.0 gm/dL      Comment: Calculated Result        A/G Ratio 1.4 g/dL      BUN/Creatinine Ratio 13.0     Anion Gap 14.0 mmol/L      eGFR 93.5 mL/min/1.73     Narrative:      GFR Normal >60  Chronic Kidney Disease <60  Kidney Failure <15      High Sensitivity Troponin T 2Hr [[id number]] Collected: 08/28/24 0340    Specimen: Blood Updated: 08/28/24 0422     HS Troponin T <6 ng/L      Troponin T Delta --     Comment: Unable to calculate.       Narrative:      High Sensitive Troponin T Reference Range:  <14.0 ng/L- Negative Female for AMI  <22.0 ng/L- Negative Male for AMI  >=14 - Abnormal Female indicating possible myocardial injury.  >=22 - Abnormal Male  indicating possible myocardial injury.   Clinicians would have to utilize clinical acumen, EKG, Troponin, and serial changes to determine if it is an Acute Myocardial Infarction or myocardial injury due to an underlying chronic condition.         STAT Lactic Acid, Reflex [[id number]]  (Normal) Collected: 08/28/24 0340    Specimen: Blood Updated: 08/28/24 0418     Lactate 1.8 mmol/L      Comment: Falsely depressed results may occur on samples drawn from patients receiving N-Acetylcysteine (NAC) or Metamizole.       CBC & Differential [[id number]]  (Abnormal) Collected: 08/28/24 0340    Specimen: Blood Updated: 08/28/24 0357    Narrative:      The following orders were created for panel order CBC & Differential.  Procedure                               Abnormality         Status                     ---------                               -----------         ------                     CBC Auto Differential[[id number]]        Abnormal            Final result                 Please view results for these tests on the individual orders.    CBC Auto Differential [[id number]]  (Abnormal) Collected: 08/28/24 0340    Specimen: Blood Updated: 08/28/24 0357     WBC 15.02 10*3/mm3      RBC 5.24 10*6/mm3      Hemoglobin 15.9 g/dL      Hematocrit 46.6 %      MCV 88.9 fL      MCH 30.3 pg      MCHC 34.1 g/dL      RDW 12.5 %      RDW-SD 41.1 fl      MPV 9.6 fL      Platelets 386 10*3/mm3      Neutrophil % 66.9 %      Lymphocyte % 24.3 %      Monocyte % 7.7 %      Eosinophil % 0.5 %      Basophil % 0.3 %      Immature Grans % 0.3 %      Neutrophils, Absolute 10.04 10*3/mm3      Lymphocytes, Absolute 3.65 10*3/mm3      Monocytes, Absolute 1.16 10*3/mm3      Eosinophils, Absolute 0.07 10*3/mm3      Basophils, Absolute 0.05 10*3/mm3      Immature Grans, Absolute 0.05 10*3/mm3      nRBC 0.0 /100 WBC     Lactic Acid, Plasma [[id number]]  (Abnormal) Collected: 08/28/24 0111    Specimen: Blood Updated: 08/28/24 0231     Lactate 4.4 mmol/L       "Comment: Falsely depressed results may occur on samples drawn from patients receiving N-Acetylcysteine (NAC) or Metamizole.       Blood Culture - Blood, Hand, Left [[id number]] Collected: 08/28/24 0105    Specimen: Blood from Hand, Left Updated: 08/28/24 0210    Blood Culture - Blood, Arm, Right [[id number]] Collected: 08/28/24 0100    Specimen: Blood from Arm, Right Updated: 08/28/24 0209    Procalcitonin [[id number]]  (Normal) Collected: 08/28/24 0049    Specimen: Blood Updated: 08/28/24 0154     Procalcitonin 0.05 ng/mL     Narrative:      As a Marker for Sepsis (Non-Neonates):    1. <0.5 ng/mL represents a low risk of severe sepsis and/or septic shock.  2. >2 ng/mL represents a high risk of severe sepsis and/or septic shock.    As a Marker for Lower Respiratory Tract Infections that require antibiotic therapy:    PCT on Admission    Antibiotic Therapy       6-12 Hrs later    >0.5                Strongly Recommended  >0.25 - <0.5        Recommended   0.1 - 0.25          Discouraged              Remeasure/reassess PCT  <0.1                Strongly Discouraged     Remeasure/reassess PCT    As 28 day mortality risk marker: \"Change in Procalcitonin Result\" (>80% or <=80%) if Day 0 (or Day 1) and Day 4 values are available. Refer to http://www.[employer]-pct-calculator.com    Change in PCT <=80%  A decrease of PCT levels below or equal to 80% defines a positive change in PCT test result representing a higher risk for 28-day all-cause mortality of patients diagnosed with severe sepsis for septic shock.    Change in PCT >80%  A decrease of PCT levels of more than 80% defines a negative change in PCT result representing a lower risk for 28-day all-cause mortality of patients diagnosed with severe sepsis or septic shock.       Urinalysis, Microscopic Only - Urine, Clean Catch [[id number]]  (Abnormal) Collected: 08/28/24 0121    Specimen: Urine, Clean Catch Updated: 08/28/24 0146     RBC, UA 0-2 /HPF      WBC, UA 0-2 /HPF      " Comment: Urine culture not indicated.        Bacteria, UA None Seen /HPF      Squamous Epithelial Cells, UA 3-6 /HPF      Hyaline Casts, UA None Seen /LPF      Methodology Manual Light Microscopy    Urinalysis With Culture If Indicated - Urine, Clean Catch [[id number]]  (Abnormal) Collected: 08/28/24 0121    Specimen: Urine, Clean Catch Updated: 08/28/24 0130     Color, UA Yellow     Appearance, UA Clear     pH, UA 6.0     Specific Gravity, UA <=1.005     Glucose,  mg/dL (1+)     Ketones, UA Negative     Bilirubin, UA Negative     Blood, UA Moderate (2+)     Protein, UA Negative     Leuk Esterase, UA Trace     Nitrite, UA Negative     Urobilinogen, UA 0.2 E.U./dL    Narrative:      In absence of clinical symptoms, the presence of pyuria, bacteria, and/or nitrites on the urinalysis result does not correlate with infection.    High Sensitivity Troponin T [[id number]]  (Normal) Collected: 08/28/24 0049    Specimen: Blood Updated: 08/28/24 0122     HS Troponin T <6 ng/L     Narrative:      High Sensitive Troponin T Reference Range:  <14.0 ng/L- Negative Female for AMI  <22.0 ng/L- Negative Male for AMI  >=14 - Abnormal Female indicating possible myocardial injury.  >=22 - Abnormal Male indicating possible myocardial injury.   Clinicians would have to utilize clinical acumen, EKG, Troponin, and serial changes to determine if it is an Acute Myocardial Infarction or myocardial injury due to an underlying chronic condition.         Basic Metabolic Panel [[id number]]  (Abnormal) Collected: 08/28/24 0049    Specimen: Blood Updated: 08/28/24 0122     Glucose 216 mg/dL      BUN 10 mg/dL      Creatinine 0.84 mg/dL      Sodium 135 mmol/L      Potassium 4.1 mmol/L      Comment: Slight hemolysis detected by analyzer. Result may be falsely elevated.        Chloride 98 mmol/L      CO2 20.0 mmol/L      Calcium 9.7 mg/dL      BUN/Creatinine Ratio 11.9     Anion Gap 17.0 mmol/L      eGFR 84.3 mL/min/1.73     Narrative:      GFR  Normal >60  Chronic Kidney Disease <60  Kidney Failure <15      Magnesium [[id number]]  (Normal) Collected: 08/28/24 0049    Specimen: Blood Updated: 08/28/24 0122     Magnesium 2.2 mg/dL     COVID-19, FLU A/B, RSV PCR 1 HR TAT - Swab, Nasopharynx [[id number]]  (Normal) Collected: 08/27/24 2346    Specimen: Swab from Nasopharynx Updated: 08/28/24 0036     COVID19 Not Detected     Influenza A PCR Not Detected     Influenza B PCR Not Detected     RSV, PCR Not Detected    Narrative:      Fact sheet for providers: https://www.fda.gov/media/797390/download    Fact sheet for patients: https://www.fda.gov/media/341156/download    Test performed by PCR.    CBC & Differential [[id number]]  (Abnormal) Collected: 08/27/24 2346    Specimen: Blood Updated: 08/28/24 0017    Narrative:      The following orders were created for panel order CBC & Differential.  Procedure                               Abnormality         Status                     ---------                               -----------         ------                     CBC Auto Differential[[id number]]        Abnormal            Final result                 Please view results for these tests on the individual orders.    CBC Auto Differential [[id number]]  (Abnormal) Collected: 08/27/24 2346    Specimen: Blood Updated: 08/28/24 0017     WBC 16.63 10*3/mm3      RBC 5.24 10*6/mm3      Hemoglobin 16.0 g/dL      Hematocrit 46.3 %      MCV 88.4 fL      MCH 30.5 pg      MCHC 34.6 g/dL      RDW 12.5 %      RDW-SD 40.8 fl      MPV 9.7 fL      Platelets 414 10*3/mm3      Neutrophil % 85.0 %      Lymphocyte % 10.8 %      Monocyte % 3.5 %      Eosinophil % 0.0 %      Basophil % 0.3 %      Immature Grans % 0.4 %      Neutrophils, Absolute 14.15 10*3/mm3      Lymphocytes, Absolute 1.79 10*3/mm3      Monocytes, Absolute 0.58 10*3/mm3      Eosinophils, Absolute 0.00 10*3/mm3      Basophils, Absolute 0.05 10*3/mm3      Immature Grans, Absolute 0.06 10*3/mm3      nRBC 0.0 /100 WBC        "    Lab Results   Component Value Date    TROPONINT <6 08/28/2024    TROPONINT <6 08/28/2024    TROPONINT <6 07/20/2023        Lab Results   Component Value Date    CHOL 126 07/03/2024    CHOL 300 (H) 08/19/2022    CHOL 202 (H) 06/06/2022     Lab Results   Component Value Date    HDL 58 07/03/2024    HDL 81 (H) 08/19/2022    HDL 51 06/06/2022     No results found for: \"LDLDIRECT\"  Lab Results   Component Value Date    TRIG 71 07/03/2024    TRIG 57 08/19/2022    TRIG 86 06/06/2022     No components found for: \"CHOLHDL\"  Lab Results   Component Value Date    INR 0.83 (L) 08/19/2022    PROTIME 11.3 (L) 08/19/2022       Ejection Fraction:      Results Review:  I reviewed the patient's new clinical results.      Assessment:  Bilateral shoulder and arm pain with negative troponins and a normal EKG  Lactic acidosis which cleared following IV hydration  Mild transaminitis with a negative hepatitis screen.  Leukocytosis  Diabetes mellitus with a hemoglobin A1c of 6.4.  Coronary artery disease  S/p anterolateral and inferior STEMI, 8/19/2022  [facility] 8/19/22: 100% occluded proximal LAD, s/p YOBANY. Non obstructive disease noted in RCA and LCx.   Echo 8/20/22: EF 50%. No sig VHD.  Echo, 11/08/2022; EF 60%. Borderline concentric LVH. Mild MR and TR. RVSP 22 mmHg.   Stress test 07/20/23: Rest EF = 83% Stress EF = 82%. Myocardial perfusion imaging indicates a normal myocardial perfusion study with no evidence of ischemia.  Hypertension  Hyperlipidemia  Asthma  Anxiety  Depression    Urinalysis is negative.  Blood cultures are pending.  Lactic acidosis improved with hydration  Unclear source for leukocytosis and mildly elevated LFTs    Plan:    From a cardiac standpoint I think the patient could be discharged to home and return for a nuclear GXT.  She ate breakfast this morning and I had to keep in the hospital overnight just for stress testing tomorrow, unless she will be here for other reasons.    I discussed the patient's findings and " my recommendations with patient.    Jeane Goncalves MD  08/28/24  09:27 EDT

## 2024-08-30 LAB
QT INTERVAL: 406 MS
QTC INTERVAL: 450 MS

## 2024-08-30 RX ORDER — DILTIAZEM HYDROCHLORIDE 240 MG/1
240 CAPSULE, COATED, EXTENDED RELEASE ORAL DAILY
Qty: 30 CAPSULE | Refills: 11 | Status: SHIPPED | OUTPATIENT
Start: 2024-08-30

## 2024-09-02 LAB
BACTERIA SPEC AEROBE CULT: NORMAL
BACTERIA SPEC AEROBE CULT: NORMAL

## 2024-09-06 ENCOUNTER — HOSPITAL ENCOUNTER (OUTPATIENT)
Dept: CARDIOLOGY | Facility: HOSPITAL | Age: 49
Discharge: HOME | End: 2024-09-06
Payer: COMMERCIAL

## 2024-09-06 VITALS — DIASTOLIC BLOOD PRESSURE: 92 MMHG | HEART RATE: 74 BPM | SYSTOLIC BLOOD PRESSURE: 126 MMHG | OXYGEN SATURATION: 97 %

## 2024-09-06 DIAGNOSIS — I25.10 CORONARY ARTERY DISEASE INVOLVING NATIVE HEART, UNSPECIFIED VESSEL OR LESION TYPE, UNSPECIFIED WHETHER ANGINA PRESENT: ICD-10-CM

## 2024-09-06 DIAGNOSIS — E78.2 MIXED HYPERLIPIDEMIA: ICD-10-CM

## 2024-09-06 PROCEDURE — 93017 CV STRESS TEST TRACING ONLY: CPT

## 2024-09-06 PROCEDURE — 78452 HT MUSCLE IMAGE SPECT MULT: CPT

## 2024-09-06 PROCEDURE — A9500 TC99M SESTAMIBI: HCPCS | Performed by: INTERNAL MEDICINE

## 2024-09-06 PROCEDURE — 0 TECHNETIUM SESTAMIBI: Performed by: INTERNAL MEDICINE

## 2024-09-06 RX ADMIN — TECHNETIUM TC 99M SESTAMIBI 1 DOSE: 1 INJECTION INTRAVENOUS at 08:55

## 2024-09-06 RX ADMIN — TECHNETIUM TC 99M SESTAMIBI 1 DOSE: 1 INJECTION INTRAVENOUS at 11:15

## 2024-09-09 LAB
BH CV REST NUCLEAR ISOTOPE DOSE: 9.9 MCI
BH CV STRESS BP STAGE 1: NORMAL
BH CV STRESS BP STAGE 2: NORMAL
BH CV STRESS BP STAGE 3: NORMAL
BH CV STRESS DURATION MIN STAGE 1: 3
BH CV STRESS DURATION MIN STAGE 2: 3
BH CV STRESS DURATION MIN STAGE 3: 2
BH CV STRESS DURATION SEC STAGE 1: 0
BH CV STRESS DURATION SEC STAGE 2: 0
BH CV STRESS DURATION SEC STAGE 3: 40
BH CV STRESS GRADE STAGE 1: 10
BH CV STRESS GRADE STAGE 2: 12
BH CV STRESS GRADE STAGE 3: 14
BH CV STRESS HR STAGE 1: 105
BH CV STRESS HR STAGE 2: 130
BH CV STRESS HR STAGE 3: 146
BH CV STRESS METS STAGE 1: 5
BH CV STRESS METS STAGE 2: 7.5
BH CV STRESS METS STAGE 3: 10
BH CV STRESS NUCLEAR ISOTOPE DOSE: 33 MCI
BH CV STRESS O2 STAGE 1: 97
BH CV STRESS O2 STAGE 2: 97
BH CV STRESS O2 STAGE 3: 97
BH CV STRESS PROTOCOL 1: NORMAL
BH CV STRESS RECOVERY BP: NORMAL MMHG
BH CV STRESS RECOVERY HR: 86 BPM
BH CV STRESS RECOVERY O2: 98 %
BH CV STRESS SPEED STAGE 1: 1.7
BH CV STRESS SPEED STAGE 2: 2.5
BH CV STRESS SPEED STAGE 3: 3.4
BH CV STRESS STAGE 1: 1
BH CV STRESS STAGE 2: 2
BH CV STRESS STAGE 3: 3
LV EF NUC BP: 83 %
MAXIMAL PREDICTED HEART RATE: 169 BPM
PERCENT MAX PREDICTED HR: 86.39 %
STRESS BASELINE BP: NORMAL MMHG
STRESS BASELINE HR: 74 BPM
STRESS O2 SAT REST: 97 %
STRESS PERCENT HR: 102 %
STRESS POST ESTIMATED WORKLOAD: 9.6 METS
STRESS POST EXERCISE DUR MIN: 8 MIN
STRESS POST EXERCISE DUR SEC: 40 SEC
STRESS POST O2 SAT PEAK: 97 %
STRESS POST PEAK BP: NORMAL MMHG
STRESS POST PEAK HR: 146 BPM
STRESS TARGET HR: 144 BPM

## 2024-09-11 ENCOUNTER — OFFICE VISIT (OUTPATIENT)
Dept: CARDIOLOGY | Facility: CLINIC | Age: 49
End: 2024-09-11
Payer: COMMERCIAL

## 2024-09-11 VITALS
OXYGEN SATURATION: 92 % | WEIGHT: 178.8 LBS | SYSTOLIC BLOOD PRESSURE: 152 MMHG | BODY MASS INDEX: 32.9 KG/M2 | HEIGHT: 62 IN | DIASTOLIC BLOOD PRESSURE: 100 MMHG | HEART RATE: 78 BPM

## 2024-09-11 DIAGNOSIS — E78.2 MIXED HYPERLIPIDEMIA: ICD-10-CM

## 2024-09-11 DIAGNOSIS — I25.10 CORONARY ARTERY DISEASE INVOLVING NATIVE CORONARY ARTERY OF NATIVE HEART, UNSPECIFIED WHETHER ANGINA PRESENT: Primary | ICD-10-CM

## 2024-09-11 DIAGNOSIS — I10 ESSENTIAL HYPERTENSION: ICD-10-CM

## 2024-09-11 PROCEDURE — 99214 OFFICE O/P EST MOD 30 MIN: CPT | Performed by: PHYSICIAN ASSISTANT

## 2024-09-11 RX ORDER — NITROGLYCERIN 0.4 MG/1
TABLET SUBLINGUAL
Qty: 25 TABLET | Refills: 11 | Status: SHIPPED | OUTPATIENT
Start: 2024-09-11

## 2024-09-11 RX ORDER — METOPROLOL TARTRATE 25 MG/1
200 TABLET, FILM COATED ORAL ONCE
OUTPATIENT
Start: 2024-09-11 | End: 2024-09-11

## 2024-09-11 RX ORDER — METOPROLOL TARTRATE 25 MG/1
50 TABLET, FILM COATED ORAL ONCE
OUTPATIENT
Start: 2024-09-11

## 2024-09-11 RX ORDER — SODIUM CHLORIDE 0.9 % (FLUSH) 0.9 %
10 SYRINGE (ML) INJECTION EVERY 12 HOURS SCHEDULED
OUTPATIENT
Start: 2024-09-11

## 2024-09-11 RX ORDER — METOPROLOL TARTRATE 100 MG
TABLET ORAL
Qty: 2 TABLET | Refills: 0 | Status: SHIPPED | OUTPATIENT
Start: 2024-09-11

## 2024-09-11 RX ORDER — IVABRADINE 5 MG/1
15 TABLET, FILM COATED ORAL ONCE
OUTPATIENT
Start: 2024-09-11 | End: 2024-09-11

## 2024-09-11 RX ORDER — METOPROLOL TARTRATE 50 MG
50 TABLET ORAL
OUTPATIENT
Start: 2024-09-11

## 2024-09-11 RX ORDER — NITROGLYCERIN 0.4 MG/1
0.8 TABLET SUBLINGUAL
OUTPATIENT
Start: 2024-09-11

## 2024-09-11 RX ORDER — METOPROLOL TARTRATE 25 MG/1
25 TABLET, FILM COATED ORAL ONCE
OUTPATIENT
Start: 2024-09-11

## 2024-09-11 RX ORDER — SODIUM CHLORIDE 9 MG/ML
40 INJECTION, SOLUTION INTRAVENOUS AS NEEDED
OUTPATIENT
Start: 2024-09-11

## 2024-09-11 RX ORDER — SODIUM CHLORIDE 0.9 % (FLUSH) 0.9 %
10 SYRINGE (ML) INJECTION AS NEEDED
OUTPATIENT
Start: 2024-09-11

## 2024-09-11 RX ORDER — NITROGLYCERIN 0.4 MG/1
0.4 TABLET SUBLINGUAL
OUTPATIENT
Start: 2024-09-11 | End: 2024-09-11

## 2024-09-11 RX ORDER — METOPROLOL TARTRATE 25 MG/1
150 TABLET, FILM COATED ORAL ONCE
OUTPATIENT
Start: 2024-09-11

## 2024-09-11 RX ORDER — METOPROLOL TARTRATE 1 MG/ML
5 INJECTION, SOLUTION INTRAVENOUS
OUTPATIENT
Start: 2024-09-11

## 2024-09-11 RX ORDER — METOPROLOL TARTRATE 25 MG/1
100 TABLET, FILM COATED ORAL ONCE
OUTPATIENT
Start: 2024-09-11

## 2024-09-11 NOTE — PROGRESS NOTES
[facility] Cardiology    Patient ID: Rebekah Ga is a 49 y.o. female.  : 1975   Contact: [phone]    Encounter date: 2024    PCP: Jozef Cummings MD      Chief complaint:   Chief Complaint   Patient presents with    Coronary artery disease involving native coronary artery of       Problem List:  Coronary artery disease  S/p anterolateral and inferior STEMI, 2022  [facility] 22: 100% occluded proximal LAD, s/p YOBANY. Non obstructive disease noted in RCA and LCx.   Echo 22: EF 50%. No sig VHD.  Echo, 2022; EF 60%. Borderline concentric LVH. Mild MR and TR. RVSP 22 mmHg.   Stress test 23: Rest EF = 83% Stress EF = 82%. Myocardial perfusion imaging indicates a normal myocardial perfusion study with no evidence of ischemia.  MPS, 2024: EF 82%. Expected exercise duration = 7:50. Actual = 8:40. (Last minute of exercise, treadmill was modified). SOO (-8). Normal with no evidence of ischemia.   Hypertension  Hyperlipidemia  Asthma  Anxiety  Depression    Allergies   Allergen Reactions    Aspirin Anaphylaxis     Patient tolerates  Ibuprofen and Aleve    Penicillins Hives and Itching       Current Medications:    Current Outpatient Medications:     albuterol sulfate  (90 Base) MCG/ACT inhaler, Inhale 2 puffs 2 (Two) Times a Day As Needed for Shortness of Air., Disp: , Rfl:     atorvastatin (LIPITOR) 80 MG tablet, Take 1 tablet by mouth Every Night., Disp: 90 tablet, Rfl: 3    Cholecalciferol (Vitamin D3) 1.25 MG (06753 UT) capsule, Take 1 capsule by mouth 1 (One) Time Per Week., Disp: 4 capsule, Rfl: 5    clonazePAM (KlonoPIN) 0.5 MG tablet, Take 1 tablet by mouth Daily As Needed for panic, Disp: 15 tablet, Rfl: 0    cloNIDine (CATAPRES) 0.1 MG tablet, TAKE ONE TABLET BY MOUTH EVERY 12 HOURS AS NEEDED FOR HIGH BLOOD PRESSURE ABOVE 180/90, Disp: 30 tablet, Rfl: 4    clopidogrel (PLAVIX) 75 MG tablet, Take 1 tablet by mouth Daily., Disp:  30 tablet, Rfl: 11    dilTIAZem CD (CARDIZEM CD) 240 MG 24 hr capsule, TAKE 1 CAPSULE BY MOUTH DAILY, Disp: 30 capsule, Rfl: 11    ezetimibe (ZETIA) 10 MG tablet, Take 1 tablet by mouth Daily., Disp: 30 tablet, Rfl: 11    FLUoxetine (PROzac) 20 MG capsule, Daily., Disp: , Rfl:     hydroCHLOROthiazide (HYDRODIURIL) 25 MG tablet, Take 1 tablet by mouth Daily. Patient can take 1/2 to 1 tablet daily PO PRN lower extremity edema., Disp: 30 tablet, Rfl: 11    lisdexamfetamine (VYVANSE) 30 MG capsule, Take 40 mg by mouth Daily, Disp: , Rfl:     losartan (COZAAR) 100 MG tablet, TAKE ONE TABLET BY MOUTH DAILY, Disp: 90 tablet, Rfl: 3    ubrogepant (ubrogepant) 100 MG tablet, Take one as needed for headache, may repeat once in 2 hours, max 200 mg in 24 hours., Disp: 10 tablet, Rfl: 5    metoprolol tartrate (LOPRESSOR) 100 MG tablet, Take 100 mg at Bedtime the Night Before Coronary CTA Appointment and In the Morning 1 Hour Prior to Coronary CTA Appointment, Disp: 2 tablet, Rfl: 0    nitroglycerin (NITROSTAT) 0.4 MG SL tablet, 1 under the tongue as needed for angina, may repeat q5mins for up three doses, Disp: 25 tablet, Rfl: 11    HPI    Rebekah Ga is a 49 y.o. female who presents today for a hospital follow up of CAD and cardiac risk factors. Since last visit, patient was briefly hospitalized and transferred from [facility] to [facility].  Patient was having some chest discomfort, had normal troponins and was discharged home with a follow-up stress test.  The stress test was low risk and showed no inducible ischemia.  Patient states overall she has been feeling better however she still continues to feel off.  She states even today she feels very fatigued just sitting down but she has no overt chest pressure, shortness of breath or arm pain.  She does state that she has been under a lot of stress and is wondering if some of her symptoms are consistent with a panic attack.  She has been  "checking her blood pressure at home and it seems to have been under control in the 110s.  Patient does note when she was briefly hospitalized that she had significant headache when they applied Nitropaste.  She also notes that she does not have a diagnosis of diabetes but did notice after reviewing her blood work that her blood sugar was elevated and hemoglobin A1c is 6.4.  She does see endocrinology but that is for her thyroid.    The following portions of the patient's history were reviewed and updated as appropriate: allergies, current medications and problem list.    Pertinent positives as listed in the HPI.  All other systems reviewed are negative.         Vitals:    09/11/24 1355   BP: 152/100   BP Location: Left arm   Patient Position: Sitting   Pulse: 78   SpO2: 92%   Weight: 81.1 kg (178 lb 12.8 oz)   Height: 157.5 cm (62\")       Physical Exam:  General: Alert and oriented.  Neck: Jugular venous pressure is within normal limits. Carotids have normal upstrokes without bruits.   Cardiovascular: Regular rate and rhythm. No murmur, gallop or rub.  Lungs: Clear, no rales or wheezes. Equal expansion is noted.   Extremities: No peripheral edema  Skin: Warm and dry.  Neurologic: Nonfocal.     Diagnostic Data (reviewed with patient):  Lab Results   Component Value Date    GLUCOSE 145 (H) 08/28/2024    BUN 10 08/28/2024    CREATININE 0.77 08/28/2024    BCR 13.0 08/28/2024     08/28/2024    K 3.7 08/28/2024     08/28/2024    CO2 22.0 08/28/2024    CALCIUM 9.3 08/28/2024    ALBUMIN 4.2 08/28/2024    ALKPHOS 105 08/28/2024    AST 33 (H) 08/28/2024    ALT 51 (H) 08/28/2024     Lab Results   Component Value Date    CHOL 126 07/03/2024    TRIG 71 07/03/2024    HDL 58 07/03/2024    LDL 54 07/03/2024      Lab Results   Component Value Date    WBC 15.02 (H) 08/28/2024    RBC 5.24 08/28/2024    HGB 15.9 08/28/2024    HCT 46.6 08/28/2024    MCV 88.9 08/28/2024     08/28/2024      Lab Results   Component Value " Date    TSH 3.270 06/06/2024            Assessment:    ICD-10-CM ICD-9-CM   1. Coronary artery disease involving native coronary artery of native heart, unspecified whether angina present  I25.10 414.01   2. Essential hypertension  I10 401.9   3. Mixed hyperlipidemia  E78.2 272.2         Plan:  Patient is still having symptoms of fatigue even with a normal stress test.  Some of her symptoms are not really consistent with her angina with her heart attack in 2022.  I had a long discussion with the patient I think it is appropriate to talk with her PCP and/or Dr. Burns about her elevated blood sugar and hemoglobin A1c of 6.4 to see if this may be contributing to some of her symptoms.  I will also order a coronary angiogram for further ischemic evaluation as the patient inquired about this as an option.  I did discuss with the patient that if she speaks with her PCP and has adjustments to her medications for treatment of her blood sugar and/or her anxiety with improvement of her symptoms she can forego the further testing with a coronary CTA.  Continue on atorvastatin 80 mg and Zetia 10 mg daily for hyperlipidemia  Continue on Plavix 75 mg daily for antiplatelet therapy.   Continue on diltiazem 240 mg daily, hydrochlorothiazide 25 mg daily, losartan 100 mg daily for hypertension.  Continue on nitroglycerin 0.4 mg PRN for chest pain.  And patient is encouraged to use this if she has any significant change in her symptoms that could indicate angina.  If she is having to take this frequently she needs to contact us.  Continue all other current medications.  F/up in 2 months, sooner if needed.    Mona Barnett PA-C

## 2024-09-19 ENCOUNTER — OFFICE VISIT (OUTPATIENT)
Age: 49
End: 2024-09-19
Payer: COMMERCIAL

## 2024-09-19 VITALS
BODY MASS INDEX: 32.76 KG/M2 | HEIGHT: 62 IN | HEART RATE: 87 BPM | DIASTOLIC BLOOD PRESSURE: 84 MMHG | SYSTOLIC BLOOD PRESSURE: 130 MMHG | OXYGEN SATURATION: 95 % | WEIGHT: 178 LBS

## 2024-09-19 DIAGNOSIS — I10 ESSENTIAL HYPERTENSION: Primary | ICD-10-CM

## 2024-09-19 DIAGNOSIS — E04.9 GOITER: ICD-10-CM

## 2024-09-19 DIAGNOSIS — R73.03 PRE-DIABETES: ICD-10-CM

## 2024-09-19 PROCEDURE — 82384 ASSAY THREE CATECHOLAMINES: CPT | Performed by: INTERNAL MEDICINE

## 2024-09-19 PROCEDURE — 82088 ASSAY OF ALDOSTERONE: CPT | Performed by: INTERNAL MEDICINE

## 2024-09-19 PROCEDURE — 84439 ASSAY OF FREE THYROXINE: CPT | Performed by: INTERNAL MEDICINE

## 2024-09-19 PROCEDURE — 84244 ASSAY OF RENIN: CPT | Performed by: INTERNAL MEDICINE

## 2024-09-19 PROCEDURE — 84443 ASSAY THYROID STIM HORMONE: CPT | Performed by: INTERNAL MEDICINE

## 2024-09-19 PROCEDURE — 99214 OFFICE O/P EST MOD 30 MIN: CPT | Performed by: INTERNAL MEDICINE

## 2024-09-20 LAB
T4 FREE SERPL-MCNC: 1.32 NG/DL (ref 0.92–1.68)
TSH SERPL DL<=0.05 MIU/L-ACNC: 1.23 UIU/ML (ref 0.27–4.2)

## 2024-09-24 LAB — ALDOST SERPL-MCNC: 11.7 NG/DL (ref 0–30)

## 2024-09-26 LAB — RENIN PLAS-CCNC: 34.84 NG/ML/HR (ref 0.17–5.38)

## 2024-09-30 RX ORDER — HYDROCHLOROTHIAZIDE 25 MG/1
TABLET ORAL
Qty: 30 TABLET | Refills: 11 | Status: SHIPPED | OUTPATIENT
Start: 2024-09-30

## 2024-10-02 LAB
DOPAMINE SERPL-MCNC: <30 PG/ML (ref 0–48)
EPINEPH PLAS-MCNC: 34 PG/ML (ref 0–62)
NOREPINEPH PLAS-MCNC: 356 PG/ML (ref 0–874)

## 2024-10-09 RX ORDER — CLOPIDOGREL BISULFATE 75 MG/1
75 TABLET ORAL DAILY
Qty: 90 TABLET | Refills: 3 | Status: SHIPPED | OUTPATIENT
Start: 2024-10-09

## 2024-10-09 NOTE — TELEPHONE ENCOUNTER
Lab Results   Component Value Date    WBC 15.02 (H) 08/28/2024    HGB 15.9 08/28/2024    HCT 46.6 08/28/2024    MCV 88.9 08/28/2024     08/28/2024

## 2024-10-31 RX ORDER — ATORVASTATIN CALCIUM 80 MG/1
80 TABLET, FILM COATED ORAL NIGHTLY
Qty: 90 TABLET | Refills: 3 | Status: SHIPPED | OUTPATIENT
Start: 2024-10-31

## 2024-10-31 NOTE — TELEPHONE ENCOUNTER
Lab Results   Component Value Date    CHOL 126 07/03/2024    TRIG 71 07/03/2024    HDL 58 07/03/2024    LDL 54 07/03/2024

## 2024-11-08 DIAGNOSIS — E78.5 HYPERLIPIDEMIA LDL GOAL <70: ICD-10-CM

## 2024-11-08 DIAGNOSIS — I25.10 CORONARY ARTERY DISEASE INVOLVING NATIVE HEART, UNSPECIFIED VESSEL OR LESION TYPE, UNSPECIFIED WHETHER ANGINA PRESENT: ICD-10-CM

## 2024-11-08 RX ORDER — EZETIMIBE 10 MG/1
10 TABLET ORAL DAILY
Qty: 30 TABLET | Refills: 6 | Status: SHIPPED | OUTPATIENT
Start: 2024-11-08

## 2024-11-25 ENCOUNTER — TELEPHONE (OUTPATIENT)
Dept: INFUSION THERAPY | Facility: HOSPITAL | Age: 49
End: 2024-11-25
Payer: COMMERCIAL

## 2024-11-25 NOTE — TELEPHONE ENCOUNTER
Attempted to contact patient as pre-procedure phone call prior to planned CT angiogram coronary planned for 11/27/24. Left voicemail message reminder with arrival time between 9:30-9:45 am to main registration, nothing to eat or drink 2 hours prior to arrival time, no caffeine after midnight, okay to take medications as per normal routine on Wednesday unless taking a stimulant,  is recommended, please make usre that you have picked up the prescription your provider has called in for you to take the night before and morning of the scan (follow pharmacy instructions) and if have any questions may contact outpatient prep and recovery at [phone].

## 2024-11-27 ENCOUNTER — HOSPITAL ENCOUNTER (OUTPATIENT)
Dept: CT IMAGING | Facility: HOSPITAL | Age: 49
Discharge: HOME | End: 2024-11-27
Payer: COMMERCIAL

## 2024-11-27 VITALS
HEART RATE: 55 BPM | OXYGEN SATURATION: 94 % | WEIGHT: 172.2 LBS | DIASTOLIC BLOOD PRESSURE: 81 MMHG | BODY MASS INDEX: 31.69 KG/M2 | HEIGHT: 62 IN | RESPIRATION RATE: 16 BRPM | TEMPERATURE: 97.8 F | SYSTOLIC BLOOD PRESSURE: 133 MMHG

## 2024-11-27 DIAGNOSIS — I25.10 CORONARY ARTERY DISEASE INVOLVING NATIVE CORONARY ARTERY OF NATIVE HEART, UNSPECIFIED WHETHER ANGINA PRESENT: ICD-10-CM

## 2024-11-27 DIAGNOSIS — R93.1 ABNORMAL FINDINGS DIAGNOSTIC IMAGING OF HEART AND CORONARY CIRCULATION: ICD-10-CM

## 2024-11-27 DIAGNOSIS — R93.1 ABNORMAL FINDINGS DIAGNOSTIC IMAGING OF HEART AND CORONARY CIRCULATION: Primary | ICD-10-CM

## 2024-11-27 PROCEDURE — 75574 CT ANGIO HRT W/3D IMAGE: CPT

## 2024-11-27 PROCEDURE — 25510000001 IOPAMIDOL PER 1 ML: Performed by: PHYSICIAN ASSISTANT

## 2024-11-27 PROCEDURE — 75580 N-INVAS EST C FFR SW ALY CTA: CPT

## 2024-11-27 RX ORDER — NITROGLYCERIN 0.4 MG/1
0.8 TABLET SUBLINGUAL
Status: COMPLETED | OUTPATIENT
Start: 2024-11-27 | End: 2024-11-27

## 2024-11-27 RX ORDER — NITROGLYCERIN 0.4 MG/1
0.4 TABLET SUBLINGUAL
Status: COMPLETED | OUTPATIENT
Start: 2024-11-27 | End: 2024-11-27

## 2024-11-27 RX ORDER — METOPROLOL TARTRATE 25 MG/1
25 TABLET, FILM COATED ORAL ONCE
Status: COMPLETED | OUTPATIENT
Start: 2024-11-27 | End: 2024-11-27

## 2024-11-27 RX ORDER — METOPROLOL TARTRATE 50 MG
50 TABLET ORAL ONCE
Status: COMPLETED | OUTPATIENT
Start: 2024-11-27 | End: 2024-11-27

## 2024-11-27 RX ORDER — IOPAMIDOL 755 MG/ML
100 INJECTION, SOLUTION INTRAVASCULAR
Status: COMPLETED | OUTPATIENT
Start: 2024-11-27 | End: 2024-11-27

## 2024-11-27 RX ORDER — IVABRADINE 5 MG/1
15 TABLET, FILM COATED ORAL ONCE
Status: DISCONTINUED | OUTPATIENT
Start: 2024-11-27 | End: 2024-11-28 | Stop reason: HOSPADM

## 2024-11-27 RX ORDER — SODIUM CHLORIDE 0.9 % (FLUSH) 0.9 %
10 SYRINGE (ML) INJECTION EVERY 12 HOURS SCHEDULED
Status: DISCONTINUED | OUTPATIENT
Start: 2024-11-27 | End: 2024-11-28 | Stop reason: HOSPADM

## 2024-11-27 RX ORDER — METOPROLOL TARTRATE 50 MG
50 TABLET ORAL
Status: DISCONTINUED | OUTPATIENT
Start: 2024-11-27 | End: 2024-11-28 | Stop reason: HOSPADM

## 2024-11-27 RX ORDER — METOPROLOL TARTRATE 100 MG/1
200 TABLET ORAL ONCE
Status: COMPLETED | OUTPATIENT
Start: 2024-11-27 | End: 2024-11-27

## 2024-11-27 RX ORDER — METOPROLOL TARTRATE 100 MG/1
100 TABLET ORAL ONCE
Status: COMPLETED | OUTPATIENT
Start: 2024-11-27 | End: 2024-11-27

## 2024-11-27 RX ORDER — METOPROLOL TARTRATE 1 MG/ML
5 INJECTION, SOLUTION INTRAVENOUS
Status: DISCONTINUED | OUTPATIENT
Start: 2024-11-27 | End: 2024-11-28 | Stop reason: HOSPADM

## 2024-11-27 RX ORDER — SODIUM CHLORIDE 9 MG/ML
40 INJECTION, SOLUTION INTRAVENOUS AS NEEDED
Status: DISCONTINUED | OUTPATIENT
Start: 2024-11-27 | End: 2024-11-28 | Stop reason: HOSPADM

## 2024-11-27 RX ORDER — SODIUM CHLORIDE 0.9 % (FLUSH) 0.9 %
10 SYRINGE (ML) INJECTION AS NEEDED
Status: DISCONTINUED | OUTPATIENT
Start: 2024-11-27 | End: 2024-11-28 | Stop reason: HOSPADM

## 2024-11-27 RX ADMIN — IOPAMIDOL 65 ML: 755 INJECTION, SOLUTION INTRAVENOUS at 12:10

## 2024-11-27 RX ADMIN — METOPROLOL TARTRATE 200 MG: 100 TABLET, FILM COATED ORAL at 10:45

## 2024-11-27 RX ADMIN — NITROGLYCERIN 0.8 MG: 0.4 TABLET SUBLINGUAL at 11:56

## 2024-12-10 ENCOUNTER — TELEPHONE (OUTPATIENT)
Dept: CARDIOLOGY | Facility: CLINIC | Age: 49
End: 2024-12-10
Payer: COMMERCIAL

## 2024-12-10 NOTE — TELEPHONE ENCOUNTER
Spoke with patient.  She has an ear infection and is being treated with antibiotics and taking nyquil/dayquil.  She does not know if it has a decongestant in it.  All medications reviewed and patient is compliant.  She has not been taking her clonidine and is encouraged to do so.  She is encouraged that more than likely it is the medication and having an infection.  She is encouraged to stop the cold medication.  She is to take her blood pressure two hours after taking her blood pressure medication in the morning.  Patient is to call back if her blood pressure is not controlled.  She verbalizes understanding.

## 2024-12-10 NOTE — TELEPHONE ENCOUNTER
Caller: Harmeet Rebekahnubia Easton     Relationship: SELF    Best call back number: [phone]    What is your medical concern? PT HAS BEEN HAVING HIGH BLOOD PRESSURE READINGS FOR ABOUT 4 DAYS NOW. SHE DOES HAVE A COLD AND IS TAKING COLD MEDICINE. SHE ALSO FEELS LIKE MAYBE HER MEDICATION IS NOT WORKING. SHE IS GETTING HEADACHES THAT SHE THINKS ARE ASSOCIATED.  SHE IS GETTING CONCERNED AND WOULD LIKE TO SPEAK TO A NURSE. PLEASE REACH OUT TO PT TO ADDRESS THIS.   HER READINGS FROM THE LAST 2 DAYS    12.09.24 -    162/101  157/108  154/109  162/112  159/108   158/112  158/118  170/106  142/100  147/105  150/100  145/91    [id number] -     140/104  148/82  154/86  143/104  148/108

## 2024-12-30 ENCOUNTER — OFFICE VISIT (OUTPATIENT)
Age: 49
End: 2024-12-30
Payer: COMMERCIAL

## 2024-12-30 VITALS
WEIGHT: 174.8 LBS | HEIGHT: 62 IN | HEART RATE: 65 BPM | SYSTOLIC BLOOD PRESSURE: 118 MMHG | DIASTOLIC BLOOD PRESSURE: 68 MMHG | OXYGEN SATURATION: 97 % | BODY MASS INDEX: 32.17 KG/M2

## 2024-12-30 DIAGNOSIS — I10 ESSENTIAL HYPERTENSION: ICD-10-CM

## 2024-12-30 DIAGNOSIS — E04.9 GOITER: Primary | ICD-10-CM

## 2024-12-30 PROCEDURE — 99213 OFFICE O/P EST LOW 20 MIN: CPT | Performed by: INTERNAL MEDICINE

## 2024-12-30 RX ORDER — ERGOCALCIFEROL 1.25 MG/1
50000 CAPSULE, LIQUID FILLED ORAL
COMMUNITY
Start: 2024-12-07

## 2024-12-30 RX ORDER — BUPROPION HYDROCHLORIDE 100 MG/1
100 TABLET, EXTENDED RELEASE ORAL DAILY
COMMUNITY
Start: 2024-12-14

## 2024-12-30 NOTE — PROGRESS NOTES
"     Office Note      Date: 2024  Patient Name: Rebekah Ga  MRN: [id number]  : 1975    Chief Complaint   Patient presents with    Goiter    Essential hypertension       History of Present Illness:   Rebekah Ga is a 49 y.o. female who presents for Goiter and Essential hypertension  .   Current rx: not on thyroid meds.. has had to take daily clonidine  to control her bp  Her labs showed - high renin- which is expected when a patient is on a diuretic.  She did not turn in the 24 hour urine cortisol but will do so  Her plasma catechols were normal.    She has a high H/ H which is likely due to nocturnal hypoxia due to sleep apnea which is not treating     Changes in history:none  Questions /problems:none    Subjective          Review of Systems:   Review of Systems   Constitutional:  Positive for fatigue.       The following portions of the patient's history were reviewed and updated as appropriate: allergies, current medications, past family history, past medical history, past social history, past surgical history, and problem list.    Objective     Visit Vitals  /68 (BP Location: Left arm, Patient Position: Sitting)   Pulse 65   Ht 157.5 cm (62\")   Wt 79.3 kg (174 lb 12.8 oz)   SpO2 97%   BMI 31.97 kg/m²           Physical Exam:  Physical Exam  Vitals reviewed.   Constitutional:       Appearance: Normal appearance.   Neck:      Comments: Diffuse goiter  Lymphadenopathy:      Cervical: No cervical adenopathy.   Neurological:      Mental Status: She is alert.         Assessment / Plan      Assessment & Plan:  Problem List Items Addressed This Visit       Goiter - Primary    Overview     Remote hx of graves disease. Treated with ptu and methimazole and rendered euthyroid / had 2.3 cm left sided trad 3 nodule in   //////////////////////////////////////////////////////////////////////////////////24  Procedure: thyroid ultrasound  Indication: hx of nodule  Results- the thyroid is " normal in size and echgenicity                There is a 2.3 cm trad 3 nodule on the left          Current Assessment & Plan     Stable and euthyroid         Essential hypertension    Current Assessment & Plan      Normal plasma catecholes  High renin excludes primary hyperaldo but is consistent with her being on a diuretic.  I don't see an underlying endocrine cause.  I am concerned about sleep apnea-  she snores, she has high h and h and treating sleep apnea if she has it would likely improve her bp          Relevant Medications    cloNIDine (CATAPRES) 0.1 MG tablet    losartan (COZAAR) 100 MG tablet    dilTIAZem CD (CARDIZEM CD) 240 MG 24 hr capsule    hydroCHLOROthiazide 25 MG tablet        Electronically signed by : Andrew Burns MD   12/30/2024

## 2024-12-30 NOTE — ASSESSMENT & PLAN NOTE
Normal plasma catecholes  High renin excludes primary hyperaldo but is consistent with her being on a diuretic.  I don't see an underlying endocrine cause.  I am concerned about sleep apnea-  she snores, she has high h and h and treating sleep apnea if she has it would likely improve her bp    Universal Safety Interventions

## 2024-12-31 ENCOUNTER — LAB (OUTPATIENT)
Facility: HOSPITAL | Age: 49
End: 2024-12-31
Payer: COMMERCIAL

## 2024-12-31 DIAGNOSIS — I10 ESSENTIAL HYPERTENSION: ICD-10-CM

## 2024-12-31 PROCEDURE — 82530 CORTISOL FREE: CPT

## 2024-12-31 PROCEDURE — 81050 URINALYSIS VOLUME MEASURE: CPT

## 2025-01-06 LAB
CORTIS F 24H UR-MCNC: 7 UG/L
CORTIS F 24H UR-MRATE: 19 UG/24 HR (ref 6–42)

## 2025-01-28 RX ORDER — CLONIDINE HYDROCHLORIDE 0.1 MG/1
TABLET ORAL
Qty: 30 TABLET | Refills: 4 | Status: SHIPPED | OUTPATIENT
Start: 2025-01-28

## 2025-03-03 ENCOUNTER — HOSPITAL ENCOUNTER (EMERGENCY)
Facility: HOSPITAL | Age: 50
Discharge: HOME | End: 2025-03-03
Attending: EMERGENCY MEDICINE | Admitting: EMERGENCY MEDICINE
Payer: COMMERCIAL

## 2025-03-03 ENCOUNTER — APPOINTMENT (OUTPATIENT)
Dept: GENERAL RADIOLOGY | Facility: HOSPITAL | Age: 50
End: 2025-03-03
Payer: COMMERCIAL

## 2025-03-03 ENCOUNTER — TELEPHONE (OUTPATIENT)
Dept: CARDIOLOGY | Facility: CLINIC | Age: 50
End: 2025-03-03
Payer: COMMERCIAL

## 2025-03-03 VITALS
DIASTOLIC BLOOD PRESSURE: 95 MMHG | HEIGHT: 62 IN | RESPIRATION RATE: 16 BRPM | TEMPERATURE: 98.1 F | BODY MASS INDEX: 32.02 KG/M2 | WEIGHT: 174 LBS | HEART RATE: 75 BPM | SYSTOLIC BLOOD PRESSURE: 153 MMHG | OXYGEN SATURATION: 96 %

## 2025-03-03 DIAGNOSIS — R07.9 CHEST PAIN, UNSPECIFIED TYPE: ICD-10-CM

## 2025-03-03 DIAGNOSIS — I10 PRIMARY HYPERTENSION: ICD-10-CM

## 2025-03-03 DIAGNOSIS — R51.9 NONINTRACTABLE HEADACHE, UNSPECIFIED CHRONICITY PATTERN, UNSPECIFIED HEADACHE TYPE: ICD-10-CM

## 2025-03-03 DIAGNOSIS — J06.9 VIRAL URI: Primary | ICD-10-CM

## 2025-03-03 LAB
ALBUMIN SERPL-MCNC: 4.4 G/DL (ref 3.5–5.2)
ALBUMIN/GLOB SERPL: 1.5 G/DL
ALP SERPL-CCNC: 111 U/L (ref 39–117)
ALT SERPL W P-5'-P-CCNC: 67 U/L (ref 1–33)
ANION GAP SERPL CALCULATED.3IONS-SCNC: 12 MMOL/L (ref 5–15)
AST SERPL-CCNC: 38 U/L (ref 1–32)
BASOPHILS # BLD AUTO: 0.07 10*3/MM3 (ref 0–0.2)
BASOPHILS NFR BLD AUTO: 0.8 % (ref 0–1.5)
BILIRUB SERPL-MCNC: 0.4 MG/DL (ref 0–1.2)
BUN SERPL-MCNC: 13 MG/DL (ref 6–20)
BUN/CREAT SERPL: 17.6 (ref 7–25)
CALCIUM SPEC-SCNC: 9.6 MG/DL (ref 8.6–10.5)
CHLORIDE SERPL-SCNC: 103 MMOL/L (ref 98–107)
CO2 SERPL-SCNC: 27 MMOL/L (ref 22–29)
CREAT SERPL-MCNC: 0.74 MG/DL (ref 0.57–1)
DEPRECATED RDW RBC AUTO: 42.8 FL (ref 37–54)
EGFRCR SERPLBLD CKD-EPI 2021: 98.1 ML/MIN/1.73
EOSINOPHIL # BLD AUTO: 0.14 10*3/MM3 (ref 0–0.4)
EOSINOPHIL NFR BLD AUTO: 1.6 % (ref 0.3–6.2)
ERYTHROCYTE [DISTWIDTH] IN BLOOD BY AUTOMATED COUNT: 13.2 % (ref 12.3–15.4)
FLUAV RNA RESP QL NAA+PROBE: NOT DETECTED
FLUBV RNA RESP QL NAA+PROBE: NOT DETECTED
GEN 5 1HR TROPONIN T REFLEX: <6 NG/L
GLOBULIN UR ELPH-MCNC: 2.9 GM/DL
GLUCOSE SERPL-MCNC: 240 MG/DL (ref 65–99)
HCT VFR BLD AUTO: 48.7 % (ref 34–46.6)
HGB BLD-MCNC: 16.5 G/DL (ref 12–15.9)
HOLD SPECIMEN: NORMAL
IMM GRANULOCYTES # BLD AUTO: 0.02 10*3/MM3 (ref 0–0.05)
IMM GRANULOCYTES NFR BLD AUTO: 0.2 % (ref 0–0.5)
LYMPHOCYTES # BLD AUTO: 3.28 10*3/MM3 (ref 0.7–3.1)
LYMPHOCYTES NFR BLD AUTO: 36.9 % (ref 19.6–45.3)
MCH RBC QN AUTO: 30 PG (ref 26.6–33)
MCHC RBC AUTO-ENTMCNC: 33.9 G/DL (ref 31.5–35.7)
MCV RBC AUTO: 88.5 FL (ref 79–97)
MONOCYTES # BLD AUTO: 0.66 10*3/MM3 (ref 0.1–0.9)
MONOCYTES NFR BLD AUTO: 7.4 % (ref 5–12)
NEUTROPHILS NFR BLD AUTO: 4.71 10*3/MM3 (ref 1.7–7)
NEUTROPHILS NFR BLD AUTO: 53.1 % (ref 42.7–76)
NRBC BLD AUTO-RTO: 0 /100 WBC (ref 0–0.2)
PLATELET # BLD AUTO: 360 10*3/MM3 (ref 140–450)
PMV BLD AUTO: 9.6 FL (ref 6–12)
POTASSIUM SERPL-SCNC: 4.1 MMOL/L (ref 3.5–5.2)
PROT SERPL-MCNC: 7.3 G/DL (ref 6–8.5)
QT INTERVAL: 378 MS
QTC INTERVAL: 452 MS
RBC # BLD AUTO: 5.5 10*6/MM3 (ref 3.77–5.28)
S PYO AG THROAT QL: NEGATIVE
SARS-COV-2 RNA RESP QL NAA+PROBE: NOT DETECTED
SODIUM SERPL-SCNC: 142 MMOL/L (ref 136–145)
TROPONIN T NUMERIC DELTA: NORMAL
TROPONIN T SERPL HS-MCNC: <6 NG/L
WBC NRBC COR # BLD AUTO: 8.88 10*3/MM3 (ref 3.4–10.8)
WHOLE BLOOD HOLD COAG: NORMAL
WHOLE BLOOD HOLD SPECIMEN: NORMAL

## 2025-03-03 PROCEDURE — 36415 COLL VENOUS BLD VENIPUNCTURE: CPT

## 2025-03-03 PROCEDURE — 96374 THER/PROPH/DIAG INJ IV PUSH: CPT

## 2025-03-03 PROCEDURE — 87880 STREP A ASSAY W/OPTIC: CPT | Performed by: EMERGENCY MEDICINE

## 2025-03-03 PROCEDURE — 25010000002 MORPHINE PER 10 MG: Performed by: EMERGENCY MEDICINE

## 2025-03-03 PROCEDURE — 87081 CULTURE SCREEN ONLY: CPT | Performed by: EMERGENCY MEDICINE

## 2025-03-03 PROCEDURE — 96375 TX/PRO/DX INJ NEW DRUG ADDON: CPT

## 2025-03-03 PROCEDURE — 87636 SARSCOV2 & INF A&B AMP PRB: CPT | Performed by: EMERGENCY MEDICINE

## 2025-03-03 PROCEDURE — 84484 ASSAY OF TROPONIN QUANT: CPT | Performed by: PHYSICIAN ASSISTANT

## 2025-03-03 PROCEDURE — 71045 X-RAY EXAM CHEST 1 VIEW: CPT

## 2025-03-03 PROCEDURE — 93005 ELECTROCARDIOGRAM TRACING: CPT | Performed by: PHYSICIAN ASSISTANT

## 2025-03-03 PROCEDURE — 85025 COMPLETE CBC W/AUTO DIFF WBC: CPT | Performed by: PHYSICIAN ASSISTANT

## 2025-03-03 PROCEDURE — 25010000002 ONDANSETRON PER 1 MG: Performed by: EMERGENCY MEDICINE

## 2025-03-03 PROCEDURE — 99284 EMERGENCY DEPT VISIT MOD MDM: CPT

## 2025-03-03 PROCEDURE — 80053 COMPREHEN METABOLIC PANEL: CPT | Performed by: PHYSICIAN ASSISTANT

## 2025-03-03 RX ORDER — ONDANSETRON 2 MG/ML
4 INJECTION INTRAMUSCULAR; INTRAVENOUS ONCE
Status: COMPLETED | OUTPATIENT
Start: 2025-03-03 | End: 2025-03-03

## 2025-03-03 RX ORDER — SODIUM CHLORIDE 0.9 % (FLUSH) 0.9 %
10 SYRINGE (ML) INJECTION AS NEEDED
Status: DISCONTINUED | OUTPATIENT
Start: 2025-03-03 | End: 2025-03-03 | Stop reason: HOSPADM

## 2025-03-03 RX ORDER — CLONIDINE HYDROCHLORIDE 0.1 MG/1
0.1 TABLET ORAL ONCE
Status: COMPLETED | OUTPATIENT
Start: 2025-03-03 | End: 2025-03-03

## 2025-03-03 RX ORDER — MORPHINE SULFATE 4 MG/ML
4 INJECTION, SOLUTION INTRAMUSCULAR; INTRAVENOUS ONCE
Status: COMPLETED | OUTPATIENT
Start: 2025-03-03 | End: 2025-03-03

## 2025-03-03 RX ADMIN — ONDANSETRON 4 MG: 2 INJECTION INTRAMUSCULAR; INTRAVENOUS at 18:36

## 2025-03-03 RX ADMIN — MORPHINE SULFATE 4 MG: 4 INJECTION, SOLUTION INTRAMUSCULAR; INTRAVENOUS at 18:36

## 2025-03-03 RX ADMIN — CLONIDINE HYDROCHLORIDE 0.1 MG: 0.1 TABLET ORAL at 18:26

## 2025-03-03 NOTE — ED PROVIDER NOTES
Subjective   History of Present Illness    Review of Systems    Past Medical History:   Diagnosis Date    ADHD     Anxiety     Bipolar affective     CAD (coronary artery disease) 2023    Depression     Graves disease ?    Hypertension     Hyperthyroidism ?    Hypothyroidism ?    Migraine     Mixed hyperlipidemia 7/3/2024    Myocardial infarction 2022    Thyroid nodule     Vitamin D deficiency        Allergies   Allergen Reactions    Aspirin Anaphylaxis     Patient tolerates  Ibuprofen and Aleve    Penicillins Hives and Itching       Past Surgical History:   Procedure Laterality Date    CARDIAC CATHETERIZATION N/A 2022    Procedure: Left Heart Cath;  Surgeon: Jeane Goncalves MD;  Location: [facility] CATH INVASIVE LOCATION;  Service: Cardiovascular;  Laterality: N/A;    PANCREAS SURGERY  2013       Family History   Problem Relation Age of Onset    Breast cancer Mother 76    Cancer Mother     Hypertension Mother     Cancer Father     Diabetes Father     Hypertension Father             Kidney disease Father             Cancer Brother             Diabetes Brother     Hypertension Brother     Diabetes Brother     Hypertension Brother     Ovarian cancer Neg Hx        Social History     Socioeconomic History    Marital status:    Tobacco Use    Smoking status: Never     Passive exposure: Never    Smokeless tobacco: Never    Tobacco comments:     Tried it in college, hated it never picked up the habit.   Vaping Use    Vaping status: Never Used   Substance and Sexual Activity    Alcohol use: Not Currently     Alcohol/week: 1.0 standard drink of alcohol     Types: 1 Glasses of wine per week     Comment: Not every week just occasionally    Drug use: No    Sexual activity: Not Currently     Partners: Male     Birth control/protection: Condom, I.U.D., Depo-provera, Birth control pill, Pill, Implant, Injection     Comment: Have Mirena, but bloodwork  shows that I'm in full menopause           Objective   Physical Exam    Procedures           ED Course                                                       Medical Decision Making  Amount and/or Complexity of Data Reviewed  Labs: ordered.  Radiology: ordered.  ECG/medicine tests: ordered.    Risk  Prescription drug management.        Final diagnoses:   None       ED Disposition  ED Disposition       None            No follow-up provider specified.       Medication List      No changes were made to your prescriptions during this visit.            Ct Ramirez PA  03/03/25 3089

## 2025-03-03 NOTE — ED PROVIDER NOTES
Subjective   History of Present Illness  49-year-old female presents emergency department today with upper respiratory viral symptoms that she little bit of a cough runny nose body aches.  She mentions she may have had a little bit of a chest pain yesterday.  Is a history of coronary disease with 1 stent he sees Dr. Goncalves.  She has a history of hypertension.,  Hypothyroidism.  She has a history of migraines.  She has not been taking her blood pressure medicine completely as written.  That she takes the clonidine occasionally.    History provided by:  Patient   used: No    Flu Symptoms  Presenting symptoms: cough, fatigue, headache and rhinorrhea    Presenting symptoms: no fever, no nausea, no shortness of breath and no vomiting    Severity:  Moderate  Onset quality:  Sudden  Duration:  1 day  Progression:  Unchanged  Chronicity:  New  Relieved by:  Nothing  Worsened by:  Nothing  Ineffective treatments:  None tried  Associated symptoms: no chills, no ear pain, no congestion, no neck stiffness and no syncope    Risk factors: no diabetes problem, no immunocompromised state, no kidney disease and not pregnant    Hypertension  Associated symptoms: chest pain, fatigue and headaches    Associated symptoms: no dizziness, no ear pain, no fever, no nausea, no palpitations, no shortness of breath, no syncope and not vomiting    Chest Pain  Pain location:  L chest  Pain quality: dull    Pain radiates to:  Does not radiate  Pain severity:  Mild  Onset quality:  Sudden  Timing:  Rare  Progression:  Resolved  Chronicity:  Recurrent  Context: not breathing, not drug use, not eating, not lifting and not movement    Context comment:  Associated with cough  Relieved by:  Nothing  Worsened by:  Nothing  Ineffective treatments:  None tried  Associated symptoms: cough, fatigue and headache    Associated symptoms: no AICD problem, no altered mental status, no anorexia, no anxiety, no dizziness, no dysphagia,  no fever, no heartburn, no nausea, no orthopnea, no palpitations, no PND, no shortness of breath, no syncope and no vomiting    Risk factors: hypertension    Risk factors: no diabetes mellitus, no Lidia-Danlos syndrome, not male, no Marfan's syndrome and no smoking        Review of Systems   Constitutional:  Positive for fatigue. Negative for chills and fever.   HENT:  Positive for rhinorrhea. Negative for congestion, ear pain and trouble swallowing.    Respiratory:  Positive for cough. Negative for chest tightness and shortness of breath.    Cardiovascular:  Positive for chest pain. Negative for palpitations, orthopnea, syncope and PND.   Gastrointestinal:  Negative for anorexia, heartburn, nausea and vomiting.   Musculoskeletal:  Negative for neck stiffness.   Neurological:  Positive for headaches. Negative for dizziness.       Past Medical History:   Diagnosis Date    ADHD     Anxiety     Bipolar affective     CAD (coronary artery disease) 2023    Depression     Graves disease ?    Hypertension     Hyperthyroidism ?    Hypothyroidism ?    Migraine     Mixed hyperlipidemia 7/3/2024    Myocardial infarction 2022    Thyroid nodule     Vitamin D deficiency        Allergies   Allergen Reactions    Aspirin Anaphylaxis     Patient tolerates  Ibuprofen and Aleve    Penicillins Hives and Itching       Past Surgical History:   Procedure Laterality Date    CARDIAC CATHETERIZATION N/A 2022    Procedure: Left Heart Cath;  Surgeon: Jeane Goncalves MD;  Location: [facility] CATH INVASIVE LOCATION;  Service: Cardiovascular;  Laterality: N/A;    PANCREAS SURGERY  2013       Family History   Problem Relation Age of Onset    Breast cancer Mother 76    Cancer Mother     Hypertension Mother     Cancer Father     Diabetes Father     Hypertension Father             Kidney disease Father             Cancer Brother             Diabetes Brother     Hypertension Brother      Diabetes Brother     Hypertension Brother     Ovarian cancer Neg Hx        Social History     Socioeconomic History    Marital status:    Tobacco Use    Smoking status: Never     Passive exposure: Never    Smokeless tobacco: Never    Tobacco comments:     Tried it in college, hated it never picked up the habit.   Vaping Use    Vaping status: Never Used   Substance and Sexual Activity    Alcohol use: Not Currently     Alcohol/week: 1.0 standard drink of alcohol     Types: 1 Glasses of wine per week     Comment: Not every week just occasionally    Drug use: No    Sexual activity: Not Currently     Partners: Male     Birth control/protection: Condom, I.U.D., Depo-provera, Birth control pill, Pill, Implant, Injection     Comment: Have Mirena, but bloodwork shows that I'm in full menopause           Objective   Physical Exam  Vitals and nursing note reviewed.   Constitutional:       General: She is not in acute distress.     Appearance: She is well-developed. She is not diaphoretic.   HENT:      Head: Normocephalic and atraumatic.      Nose: Rhinorrhea present.   Eyes:      General: No scleral icterus.     Conjunctiva/sclera: Conjunctivae normal.   Cardiovascular:      Rate and Rhythm: Normal rate and regular rhythm.      Heart sounds: Normal heart sounds. No murmur heard.  Pulmonary:      Effort: Pulmonary effort is normal. No respiratory distress.      Breath sounds: Normal breath sounds.   Abdominal:      General: Bowel sounds are normal.      Palpations: Abdomen is soft.      Tenderness: There is no abdominal tenderness.   Musculoskeletal:         General: Normal range of motion.      Cervical back: Normal range of motion and neck supple.   Skin:     General: Skin is warm and dry.   Neurological:      Mental Status: She is alert and oriented to person, place, and time.   Psychiatric:         Behavior: Behavior normal.         Procedures           ED Course  ED Course as of 03/03/25 2001   Mon Mar 03, 2025    1847 HEART Score for Major Cardiac Events from [employer]  on 3/3/2025  ** All calculations should be rechecked by clinician prior to use **    RESULT SUMMARY:  3 points  Low Score (0-3 points)    Risk of MACE of 0.9-1.7%.      INPUTS:  History -> 0 = Slightly suspicious  EKG -> 0 = Normal  Age -> 1 = 45-64  Risk factors -> 2 = >=3 risk factors or history of atherosclerotic disease  Initial troponin -> 0 = <=normal limit   [MILLER]   1855 Chest x-ray had no acute findings right independent by myself and then verified by the radiologist.  Cardiac score 3.  Her pain was yesterday of both troponins were 0.  Blood pressure came down with clonidine.  Strep and COVID and influenza A were all negative. [MILLER]   1857 Laboratory data white count was 8.88 H&H to be 16 and 48 platelet count of 360.  CMP had a glucose of 240 BUN of 13 creatinine of 0.74 sodium 142 and a potassium of 4.1 LFT slight elevated with an ALT of 67 left ear T of 38 normal alk phos at 111 and total bilirubin 0.4. [MILLER]      ED Course User Index  [MILLER] Omer Gonzalez PA                                           Recent Results (from the past 24 hours)   COVID-19 and FLU A/B PCR, 1 HR TAT - Swab, Nasopharynx    Collection Time: 03/03/25 12:39 PM    Specimen: Nasopharynx; Swab   Result Value Ref Range    COVID19 Not Detected Not Detected - Ref. Range    Influenza A PCR Not Detected Not Detected    Influenza B PCR Not Detected Not Detected   Rapid Strep A Screen - Swab, Throat    Collection Time: 03/03/25 12:41 PM    Specimen: Throat; Swab   Result Value Ref Range    Strep A Ag Negative Negative   Comprehensive Metabolic Panel    Collection Time: 03/03/25  2:07 PM    Specimen: Blood   Result Value Ref Range    Glucose 240 (H) 65 - 99 mg/dL    BUN 13 6 - 20 mg/dL    Creatinine 0.74 0.57 - 1.00 mg/dL    Sodium 142 136 - 145 mmol/L    Potassium 4.1 3.5 - 5.2 mmol/L    Chloride 103 98 - 107 mmol/L    CO2 27.0 22.0 - 29.0 mmol/L    Calcium 9.6 8.6 - 10.5 mg/dL     Total Protein 7.3 6.0 - 8.5 g/dL    Albumin 4.4 3.5 - 5.2 g/dL    ALT (SGPT) 67 (H) 1 - 33 U/L    AST (SGOT) 38 (H) 1 - 32 U/L    Alkaline Phosphatase 111 39 - 117 U/L    Total Bilirubin 0.4 0.0 - 1.2 mg/dL    Globulin 2.9 gm/dL    A/G Ratio 1.5 g/dL    BUN/Creatinine Ratio 17.6 7.0 - 25.0    Anion Gap 12.0 5.0 - 15.0 mmol/L    eGFR 98.1 >60.0 mL/min/1.73   High Sensitivity Troponin T    Collection Time: 03/03/25  2:07 PM    Specimen: Blood   Result Value Ref Range    HS Troponin T <6 <14 ng/L   Green Top (Gel)    Collection Time: 03/03/25  2:07 PM   Result Value Ref Range    Extra Tube Hold for add-ons.    Lavender Top    Collection Time: 03/03/25  2:07 PM   Result Value Ref Range    Extra Tube hold for add-on    Gold Top - SST    Collection Time: 03/03/25  2:07 PM   Result Value Ref Range    Extra Tube Hold for add-ons.    Gray Top    Collection Time: 03/03/25  2:07 PM   Result Value Ref Range    Extra Tube Hold for add-ons.    Light Blue Top    Collection Time: 03/03/25  2:07 PM   Result Value Ref Range    Extra Tube Hold for add-ons.    CBC Auto Differential    Collection Time: 03/03/25  2:07 PM    Specimen: Blood   Result Value Ref Range    WBC 8.88 3.40 - 10.80 10*3/mm3    RBC 5.50 (H) 3.77 - 5.28 10*6/mm3    Hemoglobin 16.5 (H) 12.0 - 15.9 g/dL    Hematocrit 48.7 (H) 34.0 - 46.6 %    MCV 88.5 79.0 - 97.0 fL    MCH 30.0 26.6 - 33.0 pg    MCHC 33.9 31.5 - 35.7 g/dL    RDW 13.2 12.3 - 15.4 %    RDW-SD 42.8 37.0 - 54.0 fl    MPV 9.6 6.0 - 12.0 fL    Platelets 360 140 - 450 10*3/mm3    Neutrophil % 53.1 42.7 - 76.0 %    Lymphocyte % 36.9 19.6 - 45.3 %    Monocyte % 7.4 5.0 - 12.0 %    Eosinophil % 1.6 0.3 - 6.2 %    Basophil % 0.8 0.0 - 1.5 %    Immature Grans % 0.2 0.0 - 0.5 %    Neutrophils, Absolute 4.71 1.70 - 7.00 10*3/mm3    Lymphocytes, Absolute 3.28 (H) 0.70 - 3.10 10*3/mm3    Monocytes, Absolute 0.66 0.10 - 0.90 10*3/mm3    Eosinophils, Absolute 0.14 0.00 - 0.40 10*3/mm3    Basophils, Absolute 0.07  0.00 - 0.20 10*3/mm3    Immature Grans, Absolute 0.02 0.00 - 0.05 10*3/mm3    nRBC 0.0 0.0 - 0.2 /100 WBC   ECG 12 Lead Chest Pain    Collection Time: 03/03/25  2:13 PM   Result Value Ref Range    QT Interval 378 ms    QTC Interval 452 ms   High Sensitivity Troponin T 1Hr    Collection Time: 03/03/25  4:06 PM    Specimen: Blood   Result Value Ref Range    HS Troponin T <6 <14 ng/L    Troponin T Numeric Delta     ECG 12 Lead Chest Pain    Collection Time: 03/03/25  4:08 PM   Result Value Ref Range    QT Interval 392 ms    QTC Interval 457 ms     Note: In addition to lab results from this visit, the labs listed above may include labs taken at another facility or during a different encounter within the last 24 hours. Please correlate lab times with ED admission and discharge times for further clarification of the services performed during this visit.    XR Chest 1 View   Final Result   Impression:   No active disease.            Electronically Signed: Omer Aceves MD     3/3/2025 2:48 PM EST     Workstation ID: [id number]        Vitals:    03/03/25 1538 03/03/25 1825 03/03/25 1840 03/03/25 1845   BP: (!) 145/110 (!) 164/118 153/95    BP Location:  Left arm     Patient Position:  Lying     Pulse: 84  76 75   Resp:       Temp:       TempSrc:       SpO2: 92%  95% 96%   Weight:       Height:         Medications   sodium chloride 0.9 % flush 10 mL (has no administration in time range)   cloNIDine (CATAPRES) tablet 0.1 mg (0.1 mg Oral Given 3/3/25 1826)   Morphine sulfate (PF) injection 4 mg (4 mg Intravenous Given 3/3/25 1836)   ondansetron (ZOFRAN) injection 4 mg (4 mg Intravenous Given 3/3/25 1836)     ECG/EMG Results (last 24 hours)       Procedure Component Value Units Date/Time    ECG 12 Lead Chest Pain [[id number]] Collected: 03/03/25 1608     Updated: 03/03/25 1608     QT Interval 392 ms      QTC Interval 457 ms     Narrative:      Test Reason : Chest Pain  Blood Pressure :   */*   mmHG  Vent. Rate :  82 BPM     Atrial  Rate :  82 BPM     P-R Int : 160 ms          QRS Dur :  82 ms      QT Int : 392 ms       P-R-T Axes :  15  59  17 degrees    QTcB Int : 457 ms    Normal sinus rhythm  Nonspecific ST and T wave abnormality  Abnormal ECG  When compared with ECG of 03-Mar-2025 14:13, (Unconfirmed)  Nonspecific T wave abnormality, worse in Anterior leads    Referred By: ED MD           Confirmed By:     ECG 12 Lead Chest Pain [[id number]] Collected: 03/03/25 1413     Updated: 03/03/25 1737     QT Interval 378 ms      QTC Interval 452 ms     Narrative:      Test Reason : Chest Pain  Blood Pressure :   */*   mmHG  Vent. Rate :  86 BPM     Atrial Rate :  86 BPM     P-R Int : 176 ms          QRS Dur :  86 ms      QT Int : 378 ms       P-R-T Axes :  25  48  30 degrees    QTcB Int : 452 ms    Normal sinus rhythm  Normal ECG  When compared with ECG of 27-Aug-2024 22:49,  No significant change was found  Confirmed by MD HORVATH CORY ([id number]) on 3/3/2025 5:35:19 PM    Referred By: EDMD           Confirmed By: KEVIN HORVATH MD          ECG 12 Lead Chest Pain   Preliminary Result   Test Reason : Chest Pain   Blood Pressure :   */*   mmHG   Vent. Rate :  82 BPM     Atrial Rate :  82 BPM      P-R Int : 160 ms          QRS Dur :  82 ms       QT Int : 392 ms       P-R-T Axes :  15  59  17 degrees     QTcB Int : 457 ms      Normal sinus rhythm   Nonspecific ST and T wave abnormality   Abnormal ECG   When compared with ECG of 03-Mar-2025 14:13, (Unconfirmed)   Nonspecific T wave abnormality, worse in Anterior leads      Referred By: ED MD           Confirmed By:       ECG 12 Lead Chest Pain   Final Result   Test Reason : Chest Pain   Blood Pressure :   */*   mmHG   Vent. Rate :  86 BPM     Atrial Rate :  86 BPM      P-R Int : 176 ms          QRS Dur :  86 ms       QT Int : 378 ms       P-R-T Axes :  25  48  30 degrees     QTcB Int : 452 ms      Normal sinus rhythm   Normal ECG   When compared with ECG of 27-Aug-2024 22:49,   No significant change was found    Confirmed by MD HORVATH CORY ([id number]) on 3/3/2025 5:35:19 PM      Referred By: EDMD           Confirmed By: KEVIN HORVATH MD                      Medical Decision Making  Problems Addressed:  Chest pain, unspecified type: complicated acute illness or injury  Nonintractable headache, unspecified chronicity pattern, unspecified headache type: complicated acute illness or injury  Primary hypertension: complicated acute illness or injury  Viral URI: complicated acute illness or injury    Amount and/or Complexity of Data Reviewed  Labs: ordered. Decision-making details documented in ED Course.  Radiology: ordered. Decision-making details documented in ED Course.  ECG/medicine tests: ordered. Decision-making details documented in ED Course.    Risk  Prescription drug management.        Final diagnoses:   Viral URI   Chest pain, unspecified type   Nonintractable headache, unspecified chronicity pattern, unspecified headache type   Primary hypertension       ED Disposition  ED Disposition       ED Disposition   Discharge    Condition   Stable    Comment   --               [facility] CARDIOLOGY  [address]  [facility] [address] [phone]             Medication List      No changes were made to your prescriptions during this visit.            Omer Gonzalez PA  03/03/25 2001

## 2025-03-03 NOTE — Clinical Note
[facility] EMERGENCY DEPARTMENT  [address]  [facility] [address]  Phone: [phone]    Rebekah Ga was seen and treated in our emergency department on 3/3/2025.  She may return to work on 03/05/2025.         Thank you for choosing [facility].    Omer Gonzalez PA

## 2025-03-03 NOTE — TELEPHONE ENCOUNTER
Patient left voice mail message.  She states she had a hard time waking up this morning.  She took her blood pressure and it was 175/106 but then she went back to sleep.  When she woke up her blood pressure was 146/116 and then 175/122.  She requests a return call.    LM for patient to return my call.

## 2025-03-04 LAB
QT INTERVAL: 392 MS
QTC INTERVAL: 457 MS

## 2025-03-05 LAB — BACTERIA SPEC AEROBE CULT: NORMAL

## 2025-03-06 ENCOUNTER — OFFICE VISIT (OUTPATIENT)
Dept: CARDIOLOGY | Facility: HOSPITAL | Age: 50
End: 2025-03-06
Payer: COMMERCIAL

## 2025-03-06 VITALS
DIASTOLIC BLOOD PRESSURE: 98 MMHG | HEART RATE: 75 BPM | RESPIRATION RATE: 18 BRPM | BODY MASS INDEX: 32.48 KG/M2 | WEIGHT: 176.5 LBS | SYSTOLIC BLOOD PRESSURE: 144 MMHG | HEIGHT: 62 IN | OXYGEN SATURATION: 97 %

## 2025-03-06 DIAGNOSIS — R07.89 OTHER CHEST PAIN: ICD-10-CM

## 2025-03-06 DIAGNOSIS — E78.2 MIXED HYPERLIPIDEMIA: ICD-10-CM

## 2025-03-06 DIAGNOSIS — I10 ESSENTIAL HYPERTENSION: ICD-10-CM

## 2025-03-06 DIAGNOSIS — I25.10 CORONARY ARTERY DISEASE INVOLVING NATIVE HEART WITHOUT ANGINA PECTORIS, UNSPECIFIED VESSEL OR LESION TYPE: Primary | ICD-10-CM

## 2025-03-06 RX ORDER — CARVEDILOL 6.25 MG/1
6.25 TABLET ORAL 2 TIMES DAILY
Qty: 60 TABLET | Refills: 1 | Status: SHIPPED | OUTPATIENT
Start: 2025-03-06

## 2025-03-06 RX ORDER — GABAPENTIN 100 MG/1
CAPSULE ORAL
COMMUNITY

## 2025-03-06 NOTE — PROGRESS NOTES
"Chief Complaint  Chest Pain    Subjective      History of Present Illness {  Problem List  Visit  Diagnosis   Encounters  Notes  Medications  Labs  Result Review Imaging  Media :23}     Rebekah Ga, 49 y.o. female with past medical history significant for acute pancreatitis, bipolar disorder, goiter, migraines, CAD status post PCI, hypertension, and hyperlipidemia, who presents to [facility] for Chest Pain  Patient presented to [facility] ED on 3/3/2025 with chief complaints of viral symptoms, and hypertension.  Patient stated she had been having upper respiratory viral symptoms with cough and bodyaches.  Patient endorsed having a mild amount of chest pain the day prior to presentation.  Patient stated she had not been taking her blood pressure medication as prescribed.  Initial twelve-lead EKG showed normal sinus rhythm, rate 86, normal EKG.  Chest x-ray showed no active disease.  High-sensitivity troponin x 2 noted to be unremarkable.  Patient was treated while in the ED with clonidine, morphine, and Zofran.  She was discharged from the ED with instructions to follow-up with heart and valve for further evaluation of chest discomfort.      At time of today's evaluation, patient denies any exertional chest pain.  She states she has intermittent chest discomfort occurring at rest.  She is unable to describe how often this occurs.  She describes her pain as sharp and shooting.  She states her pain at times is improved with use of clonidine.  Patient endorses ongoing dyspnea on exertion, but states she just feels \"out of shape\".  She has palpitations which she states occur frequently at approximately twice per week.  She denies any syncope, or lower extremity edema.  Patient states she has been fatigue since prior infection which occurred in February.      Of note, patient is established with cardiology and typically follows with Dr. Goncalves.  Patient has " "undergone nuclear medicine stress test, and coronary CTA within the last year.      Objective     Vital Signs:   Vitals:    03/06/25 0921 03/06/25 0922 03/06/25 0958   BP: (!) 166/112 (!) 170/105 144/98   BP Location: Left arm Left arm Left arm   Patient Position: Standing Sitting    Cuff Size: Adult Adult    Pulse: 80 75    Resp:  18    SpO2: 96% 97%    Weight:  80.1 kg (176 lb 8 oz)    Height:  157.5 cm (62\")      Body mass index is 32.28 kg/m².  Physical Exam  Vitals and nursing note reviewed.   Constitutional:       Appearance: Normal appearance. She is obese.   HENT:      Head: Normocephalic.   Eyes:      Pupils: Pupils are equal, round, and reactive to light.   Cardiovascular:      Rate and Rhythm: Normal rate and regular rhythm.      Pulses: Normal pulses.      Heart sounds: Normal heart sounds. No murmur heard.  Pulmonary:      Effort: Pulmonary effort is normal.      Breath sounds: Normal breath sounds.   Abdominal:      General: Bowel sounds are normal.      Palpations: Abdomen is soft.   Musculoskeletal:         General: Normal range of motion.      Cervical back: Normal range of motion.      Right lower leg: No edema.      Left lower leg: No edema.   Skin:     General: Skin is warm and dry.      Capillary Refill: Capillary refill takes less than 2 seconds.   Neurological:      Mental Status: She is alert and oriented to person, place, and time.   Psychiatric:         Mood and Affect: Mood normal.         Thought Content: Thought content normal.                Data Reviewed:{ Labs  Result Review  Imaging  Med Tab  Media :23}     Lab Results   Component Value Date    WBC 8.88 03/03/2025    HGB 16.5 (H) 03/03/2025    HCT 48.7 (H) 03/03/2025    MCV 88.5 03/03/2025     03/03/2025      Lab Results   Component Value Date    GLUCOSE 240 (H) 03/03/2025    BUN 13 03/03/2025    CREATININE 0.74 03/03/2025     03/03/2025    K 4.1 03/03/2025     03/03/2025    CALCIUM 9.6 03/03/2025    PROTEINTOT " 7.3 03/03/2025    ALBUMIN 4.4 03/03/2025    ALT 67 (H) 03/03/2025    AST 38 (H) 03/03/2025    ALKPHOS 111 03/03/2025    BILITOT 0.4 03/03/2025    GLOB 2.9 03/03/2025    AGRATIO 1.5 03/03/2025    BCR 17.6 03/03/2025    ANIONGAP 12.0 03/03/2025    EGFR 98.1 03/03/2025      Lab Results   Component Value Date    CHOL 126 07/03/2024    TRIG 71 07/03/2024    HDL 58 07/03/2024    LDL 54 07/03/2024      Lab Results   Component Value Date    TROPONINT <6 03/03/2025        ECG 12 Lead Chest Pain (03/03/2025 14:13)  ECG 12 Lead Chest Pain (03/03/2025 16:08)  CT Coronary FFR CTA Data CIERA & [facility] Estimated FFR Model (11/27/2024 16:21)  CT Angiogram Coronary-Cardiology Interpretation (11/27/2024 12:15)  CT Angiogram Coronary (11/27/2024 12:14)  Stress Test With Myocardial Perfusion One Day (09/06/2024 11:36)  ECG 12 Lead Chest Pain (08/27/2024 22:49)  Assessment & Plan   Assessment and Plan {CC Problem List  Visit Diagnosis  ROS  Review (Popup)  Health Maintenance  Quality  BestPractice  Medications  SmartSets  SnapShot Encounters  Media :23}     1. Coronary artery disease involving native heart without angina pectoris, unspecified vessel or lesion type  -NSTEMI leading to LHC 8/19/22: 100% occluded proximal LAD, s/p YOBANY. Non obstructive disease noted in RCA and LCx   -MPS 9/6/2024: EF 82%. Expected exercise duration = 7:50. Actual = 8:40. (Last minute of exercise, treadmill was modified). SOO (-8). Normal with no evidence of ischemia   -Coronary CTA 11/27/2024:  Intermediate as well as indeterminant atherosclerotic disease in the LAD with potentially CAD RADS 3 stenosis just proximal to the previously placed stent.  The stent appears patent but in the limited views obtained.    CAD RADS 1-2 atherosclerotic disease in the circumflex and RCA   The total calcium score is indeterminate due to the presence of an LAD stent.  Score of 133 in the RCA.  No non-atherosclerotic coronary abnormalities  Please refer to the  radiology report for information on non-cardiac structures.     -Most recent ED evaluation reviewed and discussed today with patient.  Patient having atypical chest discomfort.    -Patient endorses compliance with losartan, HCTZ, Zetia, diltiazem, Plavix, and atorvastatin  -Patient endorses significant fatigue recently.  Recommend patient follow-up with primary care after recent respiratory infections, and ongoing fatigue.      2.  Other chest pain  -Patient's recent episodes of chest discomfort are described as atypical in nature  -Patient states the discomfort she experiences is very mild, and is barely noticeable  -We have reviewed and discussed most recent ED evaluation including EKG, chest x-ray, and laboratory results  -We have also very briefly reviewed most recent cardiac testing including stress testing, and coronary CTA.    3. Essential hypertension  -Blood pressure poorly controlled during today's evaluation, and according to patient's ambulatory readings  -Reinforced importance of ambulatory blood pressure reading.  Patient instructed to check blood pressure once per day using upper arm blood pressure cuff.  Ideally, she would check blood pressure 2 to 3 hours after taking morning medication, and after sitting and resting for 2 to 3 hours.  -Patient encouraged to document date, time, blood pressure, and pulse rate.  Her goal blood pressure is consistently less than 140/90  -Due to poorly controlled blood pressure readings, we will initiate carvedilol 6.25 mg twice daily.  Patient is aware of recommendation to monitor for hyper or hypotension during medication initiation  -Recommend patient follow-up closely with primary care in 2 to 3 weeks after initiating medication  -Would also recommend patient follow-up with primary cardiology team in approximately 4 to 6 weeks  - carvedilol (COREG) 6.25 MG tablet; Take 1 tablet by mouth 2 (Two) Times a Day.  Dispense: 60 tablet; Refill: 1    4. Mixed  hyperlipidemia  -Patient currently maintained on atorvastatin 80 mg daily      Patient may be seen by heart and valve center as needed, or if symptoms change or worsen.  Otherwise, recommend close continue follow-up with primary cardiology team, and PCP.    Follow Up {Instructions Charge Capture  Follow-up Communications :23}     Return if symptoms worsen or fail to improve.      Patient was given instructions and counseling regarding her condition or for health maintenance advice. Please see specific information pulled into the AVS if appropriate. Patient was instructed to call the Heart and Valve Center with any questions, concerns, or worsening symptoms.    Dictated Utilizing Dragon Dictation   Please note that portions of this note were completed with a voice recognition program. Part of this note may be an electronic transcription/translation of spoken language to printed text using the Dragon Dictation System.

## 2025-03-06 NOTE — PATIENT INSTRUCTIONS
Start to check blood pressure once per day using upper arm blood pressure cuff  Do so 2-3 hours after taking morning medications and after sitting and resting for 10-15 minutes  Write down date, time, blood pressure, and pulse rate  Goal blood pressure AFTER medications is consistently less than 140/90  Bring blood pressure log to next cardiology visit.

## 2025-03-11 LAB
QT INTERVAL: 392 MS
QTC INTERVAL: 457 MS

## 2025-05-07 ENCOUNTER — OFFICE VISIT (OUTPATIENT)
Dept: CARDIOLOGY | Facility: CLINIC | Age: 50
End: 2025-05-07
Payer: COMMERCIAL

## 2025-05-07 VITALS
OXYGEN SATURATION: 94 % | SYSTOLIC BLOOD PRESSURE: 130 MMHG | HEIGHT: 62 IN | BODY MASS INDEX: 32.94 KG/M2 | WEIGHT: 179 LBS | DIASTOLIC BLOOD PRESSURE: 88 MMHG

## 2025-05-07 DIAGNOSIS — E78.2 MIXED HYPERLIPIDEMIA: ICD-10-CM

## 2025-05-07 DIAGNOSIS — I10 ESSENTIAL HYPERTENSION: ICD-10-CM

## 2025-05-07 DIAGNOSIS — I25.10 CORONARY ARTERY DISEASE INVOLVING NATIVE CORONARY ARTERY OF NATIVE HEART, UNSPECIFIED WHETHER ANGINA PRESENT: Primary | ICD-10-CM

## 2025-05-07 PROCEDURE — 99214 OFFICE O/P EST MOD 30 MIN: CPT | Performed by: INTERNAL MEDICINE

## 2025-05-07 NOTE — PROGRESS NOTES
[facility] Cardiology    Patient ID: Rebekah Ga is a 50 y.o. female.  : 1975   Contact: [phone]    Encounter date: 2025    PCP: Jozef Cummings MD      Chief complaint:   Chief Complaint   Patient presents with    Coronary artery disease involving native coronary artery of       Problem List:  Coronary artery disease  S/p anterolateral and inferior STEMI, 2022  [facility] 22: 100% occluded proximal LAD, s/p YOBANY. Non obstructive disease noted in RCA and LCx.   Echo 22: EF 50%. No sig VHD.  Echo, 2022; EF 60%. Borderline concentric LVH. Mild MR and TR. RVSP 22 mmHg.   Stress test 23: Rest EF = 83% Stress EF = 82%. Myocardial perfusion imaging indicates a normal myocardial perfusion study with no evidence of ischemia.  MPS, 2024: EF 82%. Expected exercise duration = 7:50. Actual = 8:40. (Last minute of exercise, treadmill was modified). SOO (-8). Normal with no evidence of ischemia.   Hypertension, labile  Hyperlipidemia  Asthma  Anxiety  Depression       Allergies   Allergen Reactions    Aspirin Anaphylaxis     Patient tolerates  Ibuprofen and Aleve    Penicillins Hives and Itching       Current Medications:    Current Outpatient Medications:     albuterol sulfate  (90 Base) MCG/ACT inhaler, Inhale 2 puffs 2 (Two) Times a Day As Needed for Shortness of Air., Disp: , Rfl:     atorvastatin (LIPITOR) 80 MG tablet, TAKE ONE TABLET BY MOUTH ONCE NIGHTLY, Disp: 90 tablet, Rfl: 3    carvedilol (COREG) 6.25 MG tablet, Take 1 tablet by mouth 2 (Two) Times a Day., Disp: 60 tablet, Rfl: 1    clonazePAM (KlonoPIN) 0.5 MG tablet, Take 1 tablet by mouth Daily As Needed for panic, Disp: 15 tablet, Rfl: 0    cloNIDine (CATAPRES) 0.1 MG tablet, TAKE ONE TABLET BY MOUTH EVERY 12 HOURS AS NEEDED FOR HIGH BLOOD PRESSURE ABOVE 180/90, Disp: 30 tablet, Rfl: 4    clopidogrel (PLAVIX) 75 MG tablet, TAKE 1 TABLET BY MOUTH DAILY, Disp: 90 tablet,  Rfl: 3    dilTIAZem CD (CARDIZEM CD) 240 MG 24 hr capsule, TAKE 1 CAPSULE BY MOUTH DAILY, Disp: 30 capsule, Rfl: 11    ezetimibe (ZETIA) 10 MG tablet, TAKE 1 TABLET BY MOUTH DAILY, Disp: 30 tablet, Rfl: 6    FLUoxetine (PROzac) 20 MG capsule, Daily., Disp: , Rfl:     gabapentin (NEURONTIN) 100 MG capsule, Take 1 capsule every day by oral route as needed for 30 days., Disp: , Rfl:     hydroCHLOROthiazide 25 MG tablet, TAKE 1 TABLET BY MOUTH DAILY . CAN TAKE 1/2 TO 1 TABLET DAILY IF NEEDED FOR LOWER EXTREMITY EDEMA, Disp: 30 tablet, Rfl: 11    losartan (COZAAR) 100 MG tablet, TAKE ONE TABLET BY MOUTH DAILY, Disp: 90 tablet, Rfl: 3    nitroglycerin (NITROSTAT) 0.4 MG SL tablet, 1 under the tongue as needed for angina, may repeat q5mins for up three doses, Disp: 25 tablet, Rfl: 11    ubrogepant (ubrogepant) 100 MG tablet, Take one as needed for headache, may repeat once in 2 hours, max 200 mg in 24 hours., Disp: 10 tablet, Rfl: 5    Viloxazine HCl  MG capsule sustained-release 24 hr, Take 3 capsules by mouth Every Morning., Disp: , Rfl:     vitamin D (ERGOCALCIFEROL) 1.25 MG (44309 UT) capsule capsule, Take 1 capsule by mouth Every 7 (Seven) Days., Disp: , Rfl:     buPROPion SR (WELLBUTRIN SR) 100 MG 12 hr tablet, Take 1 tablet by mouth Daily. (Patient not taking: Reported on 5/7/2025), Disp: , Rfl:     HPI    Rebekah Ga is a 50 y.o. female who presents today for a 2 MO follow up of CAD, and cardiac risk factors. Since last visit, Rebekah has been seen several times in the emergency room for spikes in her hypertension.  She notes that she is under a fair amount of stress right now and is undergoing a divorce.  She has not had a recurrence of chest pain such as she had at the time of stent placement.  She has not been very active and has no routine exercise program.  Patient denies chest pain, shortness of breath, orthopnea, palpitations, edema, dizziness, and syncope.         The following portions of the  "patient's history were reviewed and updated as appropriate: allergies, current medications and problem list.    Pertinent positives as listed in the HPI.  All other systems reviewed are negative.         Vitals:    05/07/25 1613   BP: 130/88   BP Location: Left arm   Patient Position: Sitting   Cuff Size: Adult   SpO2: 94%   Weight: 81.2 kg (179 lb)   Height: 157.5 cm (62\")       Physical Exam:  General: Alert and oriented.  Neck: Jugular venous pressure is within normal limits. Carotids have normal upstrokes without bruits.   Cardiovascular: Heart has a nondisplaced focal PMI. Regular rate and rhythm. No murmur, gallop or rub.  Lungs: Clear, no rales or wheezes. Equal expansion is noted.   Extremities: Show no edema.  Skin: Warm and dry.  Neurologic: Nonfocal.     Diagnostic Data (reviewed with patient):  Lab Results   Component Value Date    GLUCOSE 240 (H) 03/03/2025    BUN 13 03/03/2025    CREATININE 0.74 03/03/2025    BCR 17.6 03/03/2025     03/03/2025    K 4.1 03/03/2025     03/03/2025    CO2 27.0 03/03/2025    CALCIUM 9.6 03/03/2025    ALBUMIN 4.4 03/03/2025    ALKPHOS 111 03/03/2025    AST 38 (H) 03/03/2025    ALT 67 (H) 03/03/2025     Lab Results   Component Value Date    CHOL 126 07/03/2024    TRIG 71 07/03/2024    HDL 58 07/03/2024    LDL 54 07/03/2024      Lab Results   Component Value Date    WBC 8.88 03/03/2025    RBC 5.50 (H) 03/03/2025    HGB 16.5 (H) 03/03/2025    HCT 48.7 (H) 03/03/2025    MCV 88.5 03/03/2025     03/03/2025      Lab Results   Component Value Date    TSH 1.230 09/19/2024        Procedures               Assessment:    ICD-10-CM ICD-9-CM   1. Coronary artery disease involving native coronary artery of native heart, unspecified whether angina present  I25.10 414.01   2. Essential hypertension  I10 401.9   3. Mixed hyperlipidemia  E78.2 272.2         Plan:  Recommended to get aerobic exercise 30 minutes at least 4 days a week.  Continue to monitor minimally elevated " elevated LFTs  Perform a nocturnal oximeter to monitor oxygen levels at night as sleep apnea may be a source for her fatigue  Continue on nitroglycerin 0.4 mg PRN for chest pain.  Continue on losartan 100 mg daily for hypertension.   Continue on Plavix 75 mg daily for antiplatelet therapy.   Continue on carvedilol 6.25 mg BID for hypertension.  Continue on atorvastatin 80 mg daily for hyperlipidemia.   Continue all other current medications.  F/up in 6 months, sooner if needed.    Scribed for Jeane Goncalves MD by Chacho Hsieh. 5/7/2025 16:21 EDT    I Jeane Goncalves MD personally performed the services described in this documentation as scribed by the above individual in my presence, and it is both accurate and complete.    Jeane Goncalves MD, FACC

## 2025-05-08 DIAGNOSIS — I10 ESSENTIAL HYPERTENSION: ICD-10-CM

## 2025-05-08 RX ORDER — CARVEDILOL 6.25 MG/1
6.25 TABLET ORAL 2 TIMES DAILY
Qty: 60 TABLET | Refills: 2 | Status: SHIPPED | OUTPATIENT
Start: 2025-05-08

## 2025-06-04 DIAGNOSIS — I16.0 HYPERTENSIVE URGENCY: ICD-10-CM

## 2025-06-04 DIAGNOSIS — I25.10 CORONARY ARTERY DISEASE INVOLVING NATIVE CORONARY ARTERY OF NATIVE HEART WITHOUT ANGINA PECTORIS: Primary | ICD-10-CM

## 2025-06-04 DIAGNOSIS — E78.2 MIXED HYPERLIPIDEMIA: ICD-10-CM

## 2025-06-04 DIAGNOSIS — I10 ESSENTIAL HYPERTENSION: ICD-10-CM

## 2025-06-04 DIAGNOSIS — R73.03 PRE-DIABETES: ICD-10-CM

## 2025-06-04 NOTE — PROGRESS NOTES
Per [facility] - patient needs referral to sleep medicine d/t results of overnight pulse oximetry.  LM for patient as to referral.  She is encouraged to call with any questions or concerns.

## 2025-07-13 ENCOUNTER — APPOINTMENT (OUTPATIENT)
Dept: CT IMAGING | Facility: HOSPITAL | Age: 50
End: 2025-07-13
Payer: COMMERCIAL

## 2025-07-13 ENCOUNTER — HOSPITAL ENCOUNTER (EMERGENCY)
Facility: HOSPITAL | Age: 50
Discharge: HOME | End: 2025-07-13
Attending: EMERGENCY MEDICINE | Admitting: EMERGENCY MEDICINE
Payer: COMMERCIAL

## 2025-07-13 VITALS
OXYGEN SATURATION: 92 % | RESPIRATION RATE: 18 BRPM | HEART RATE: 85 BPM | SYSTOLIC BLOOD PRESSURE: 161 MMHG | TEMPERATURE: 98 F | BODY MASS INDEX: 32.2 KG/M2 | WEIGHT: 175 LBS | DIASTOLIC BLOOD PRESSURE: 117 MMHG | HEIGHT: 62 IN

## 2025-07-13 DIAGNOSIS — R10.84 GENERALIZED ABDOMINAL PAIN: Primary | ICD-10-CM

## 2025-07-13 DIAGNOSIS — R11.2 NAUSEA AND VOMITING, UNSPECIFIED VOMITING TYPE: ICD-10-CM

## 2025-07-13 LAB
ALBUMIN SERPL-MCNC: 4.2 G/DL (ref 3.5–5.2)
ALBUMIN/GLOB SERPL: 1.3 G/DL
ALP SERPL-CCNC: 121 U/L (ref 39–117)
ALT SERPL W P-5'-P-CCNC: 76 U/L (ref 1–33)
ANION GAP SERPL CALCULATED.3IONS-SCNC: 16 MMOL/L (ref 5–15)
AST SERPL-CCNC: 68 U/L (ref 1–32)
B-HCG UR QL: NEGATIVE
BACTERIA UR QL AUTO: ABNORMAL /HPF
BASOPHILS # BLD AUTO: 0.04 10*3/MM3 (ref 0–0.2)
BASOPHILS NFR BLD AUTO: 0.3 % (ref 0–1.5)
BILIRUB SERPL-MCNC: 0.6 MG/DL (ref 0–1.2)
BILIRUB UR QL STRIP: NEGATIVE
BUN SERPL-MCNC: 9.2 MG/DL (ref 6–20)
BUN/CREAT SERPL: 10.7 (ref 7–25)
CALCIUM SPEC-SCNC: 9.3 MG/DL (ref 8.6–10.5)
CHLORIDE SERPL-SCNC: 102 MMOL/L (ref 98–107)
CLARITY UR: CLEAR
CO2 SERPL-SCNC: 22 MMOL/L (ref 22–29)
COLOR UR: YELLOW
CREAT SERPL-MCNC: 0.86 MG/DL (ref 0.57–1)
D-LACTATE SERPL-SCNC: 1.7 MMOL/L (ref 0.5–2)
D-LACTATE SERPL-SCNC: 2.9 MMOL/L (ref 0.5–2)
DEPRECATED RDW RBC AUTO: 41 FL (ref 37–54)
EGFRCR SERPLBLD CKD-EPI 2021: 81.4 ML/MIN/1.73
EOSINOPHIL # BLD AUTO: 0.11 10*3/MM3 (ref 0–0.4)
EOSINOPHIL NFR BLD AUTO: 0.9 % (ref 0.3–6.2)
ERYTHROCYTE [DISTWIDTH] IN BLOOD BY AUTOMATED COUNT: 12.4 % (ref 12.3–15.4)
EXPIRATION DATE: NORMAL
GLOBULIN UR ELPH-MCNC: 3.3 GM/DL
GLUCOSE SERPL-MCNC: 158 MG/DL (ref 65–99)
GLUCOSE UR STRIP-MCNC: NEGATIVE MG/DL
HCG INTACT+B SERPL-ACNC: 2.85 MIU/ML
HCT VFR BLD AUTO: 49.1 % (ref 34–46.6)
HGB BLD-MCNC: 16.7 G/DL (ref 12–15.9)
HGB UR QL STRIP.AUTO: ABNORMAL
HOLD SPECIMEN: NORMAL
HYALINE CASTS UR QL AUTO: ABNORMAL /LPF
IMM GRANULOCYTES # BLD AUTO: 0.04 10*3/MM3 (ref 0–0.05)
IMM GRANULOCYTES NFR BLD AUTO: 0.3 % (ref 0–0.5)
INTERNAL NEGATIVE CONTROL: NORMAL
INTERNAL POSITIVE CONTROL: NORMAL
KETONES UR QL STRIP: ABNORMAL
LEUKOCYTE ESTERASE UR QL STRIP.AUTO: ABNORMAL
LIPASE SERPL-CCNC: 34 U/L (ref 13–60)
LYMPHOCYTES # BLD AUTO: 2.48 10*3/MM3 (ref 0.7–3.1)
LYMPHOCYTES NFR BLD AUTO: 20.3 % (ref 19.6–45.3)
Lab: NORMAL
MCH RBC QN AUTO: 30.2 PG (ref 26.6–33)
MCHC RBC AUTO-ENTMCNC: 34 G/DL (ref 31.5–35.7)
MCV RBC AUTO: 88.8 FL (ref 79–97)
MONOCYTES # BLD AUTO: 0.81 10*3/MM3 (ref 0.1–0.9)
MONOCYTES NFR BLD AUTO: 6.6 % (ref 5–12)
NEUTROPHILS NFR BLD AUTO: 71.6 % (ref 42.7–76)
NEUTROPHILS NFR BLD AUTO: 8.71 10*3/MM3 (ref 1.7–7)
NITRITE UR QL STRIP: NEGATIVE
NRBC BLD AUTO-RTO: 0 /100 WBC (ref 0–0.2)
PH UR STRIP.AUTO: 6.5 [PH] (ref 5–8)
PLATELET # BLD AUTO: 348 10*3/MM3 (ref 140–450)
PMV BLD AUTO: 9.7 FL (ref 6–12)
POTASSIUM SERPL-SCNC: 4 MMOL/L (ref 3.5–5.2)
PROT SERPL-MCNC: 7.5 G/DL (ref 6–8.5)
PROT UR QL STRIP: ABNORMAL
RBC # BLD AUTO: 5.53 10*6/MM3 (ref 3.77–5.28)
RBC # UR STRIP: ABNORMAL /HPF
REF LAB TEST METHOD: ABNORMAL
SODIUM SERPL-SCNC: 140 MMOL/L (ref 136–145)
SP GR UR STRIP: 1.02 (ref 1–1.03)
SQUAMOUS #/AREA URNS HPF: ABNORMAL /HPF
UROBILINOGEN UR QL STRIP: ABNORMAL
WBC # UR STRIP: ABNORMAL /HPF
WBC NRBC COR # BLD AUTO: 12.19 10*3/MM3 (ref 3.4–10.8)
WHOLE BLOOD HOLD COAG: NORMAL
WHOLE BLOOD HOLD SPECIMEN: NORMAL

## 2025-07-13 PROCEDURE — 83690 ASSAY OF LIPASE: CPT | Performed by: EMERGENCY MEDICINE

## 2025-07-13 PROCEDURE — 80053 COMPREHEN METABOLIC PANEL: CPT | Performed by: EMERGENCY MEDICINE

## 2025-07-13 PROCEDURE — 85025 COMPLETE CBC W/AUTO DIFF WBC: CPT | Performed by: EMERGENCY MEDICINE

## 2025-07-13 PROCEDURE — 96375 TX/PRO/DX INJ NEW DRUG ADDON: CPT

## 2025-07-13 PROCEDURE — 83605 ASSAY OF LACTIC ACID: CPT | Performed by: EMERGENCY MEDICINE

## 2025-07-13 PROCEDURE — 74177 CT ABD & PELVIS W/CONTRAST: CPT

## 2025-07-13 PROCEDURE — 96374 THER/PROPH/DIAG INJ IV PUSH: CPT

## 2025-07-13 PROCEDURE — 81001 URINALYSIS AUTO W/SCOPE: CPT | Performed by: EMERGENCY MEDICINE

## 2025-07-13 PROCEDURE — 99285 EMERGENCY DEPT VISIT HI MDM: CPT

## 2025-07-13 PROCEDURE — 25010000002 ONDANSETRON PER 1 MG: Performed by: EMERGENCY MEDICINE

## 2025-07-13 PROCEDURE — 25810000003 SODIUM CHLORIDE 0.9 % SOLUTION: Performed by: EMERGENCY MEDICINE

## 2025-07-13 PROCEDURE — 36415 COLL VENOUS BLD VENIPUNCTURE: CPT

## 2025-07-13 PROCEDURE — 25010000002 DROPERIDOL PER 5 MG: Performed by: EMERGENCY MEDICINE

## 2025-07-13 PROCEDURE — 84702 CHORIONIC GONADOTROPIN TEST: CPT | Performed by: EMERGENCY MEDICINE

## 2025-07-13 PROCEDURE — 81025 URINE PREGNANCY TEST: CPT | Performed by: EMERGENCY MEDICINE

## 2025-07-13 PROCEDURE — 25510000001 IOPAMIDOL 61 % SOLUTION: Performed by: EMERGENCY MEDICINE

## 2025-07-13 RX ORDER — DROPERIDOL 2.5 MG/ML
2.5 INJECTION, SOLUTION INTRAMUSCULAR; INTRAVENOUS ONCE
Status: COMPLETED | OUTPATIENT
Start: 2025-07-13 | End: 2025-07-13

## 2025-07-13 RX ORDER — ONDANSETRON 2 MG/ML
4 INJECTION INTRAMUSCULAR; INTRAVENOUS ONCE
Status: COMPLETED | OUTPATIENT
Start: 2025-07-13 | End: 2025-07-13

## 2025-07-13 RX ORDER — IOPAMIDOL 612 MG/ML
85 INJECTION, SOLUTION INTRAVASCULAR
Status: COMPLETED | OUTPATIENT
Start: 2025-07-13 | End: 2025-07-13

## 2025-07-13 RX ORDER — MORPHINE SULFATE 4 MG/ML
4 INJECTION, SOLUTION INTRAMUSCULAR; INTRAVENOUS
Status: DISCONTINUED | OUTPATIENT
Start: 2025-07-13 | End: 2025-07-13 | Stop reason: HOSPADM

## 2025-07-13 RX ORDER — SODIUM CHLORIDE 9 MG/ML
10 INJECTION, SOLUTION INTRAMUSCULAR; INTRAVENOUS; SUBCUTANEOUS AS NEEDED
Status: DISCONTINUED | OUTPATIENT
Start: 2025-07-13 | End: 2025-07-13 | Stop reason: HOSPADM

## 2025-07-13 RX ORDER — ONDANSETRON 4 MG/1
4 TABLET, ORALLY DISINTEGRATING ORAL 4 TIMES DAILY PRN
Qty: 15 TABLET | Refills: 0 | Status: SHIPPED | OUTPATIENT
Start: 2025-07-13

## 2025-07-13 RX ADMIN — IOPAMIDOL 75 ML: 612 INJECTION, SOLUTION INTRAVENOUS at 19:32

## 2025-07-13 RX ADMIN — ONDANSETRON 4 MG: 2 INJECTION, SOLUTION INTRAMUSCULAR; INTRAVENOUS at 18:58

## 2025-07-13 RX ADMIN — SODIUM CHLORIDE 1000 ML: 9 INJECTION, SOLUTION INTRAVENOUS at 18:58

## 2025-07-13 RX ADMIN — DROPERIDOL 2.5 MG: 2.5 INJECTION, SOLUTION INTRAMUSCULAR; INTRAVENOUS at 21:13

## 2025-07-13 NOTE — ED PROVIDER NOTES
[facility]    EMERGENCY DEPARTMENT ENCOUNTER      Pt Name: Rebekah Ga  MRN: [id number]  YOB: 1975  Date of evaluation: 7/13/2025  Provider: Jozef Becerra DO    CHIEF COMPLAINT       Chief Complaint   Patient presents with    Abdominal Pain       HPI  Stated Reason for Visit: pt here for sharp abd pain, started about 1 hour ago, pt had one episode of dry heaving, also complains of bilateral flank painHistory Obtained From: patient       HISTORY OF PRESENT ILLNESS  (Location/Symptom, Timing/Onset, Context/Setting, Quality, Duration, Modifying Factors, Severity.)   Rebekah Ga is a 50 y.o. female who presents to the emergency department for evaluation of periumbilical left mid abdomen pain which started about an hour or 2 prior to arrival.  Patient states she is recently got back from [place], had some cough chills febrile illness with diagnosed with COVID on Friday.  She notes her symptoms from that have started to improve and today she started with some pretty significant left-sided periumbilical abdominal pain, nausea and vomiting, try to use the bathroom but the pain persisted.  She notes a history of pancreatitis, pancreatic cyst with a cyst removal, history of MI in 2022 with stent placement.  No prior history of kidney stones.  She denies any fall, injury or trauma.  She has generalized abdominal discomfort mainly around the periumbilical region, some mild bilateral flank discomfort.  She denies any chest pain, difficulty breathing, recent fever over the last couple days.  Patient denies any other abdominal surgeries.  She denies any other acute systemic complaints at this time.  No urinary symptoms.    Nursing notes were reviewed.      PAST MEDICAL HISTORY     Past Medical History:   Diagnosis Date    ADHD     Anxiety     Bipolar affective     CAD (coronary artery disease) 7/20/2023    Depression     Graves disease 1994?    Hypertension     Hyperthyroidism 1994?     Hypothyroidism 2002?    Migraine     Mixed hyperlipidemia 7/3/2024    Myocardial infarction 08/19/2022    Thyroid nodule 2022    Vitamin D deficiency 2010         SURGICAL HISTORY       Past Surgical History:   Procedure Laterality Date    CARDIAC CATHETERIZATION N/A 08/19/2022    Procedure: Left Heart Cath;  Surgeon: Jeane Goncalves MD;  Location: [facility] CATH INVASIVE LOCATION;  Service: Cardiovascular;  Laterality: N/A;    PANCREAS SURGERY  5/28/2013         CURRENT MEDICATIONS       Current Facility-Administered Medications:     Morphine sulfate (PF) injection 4 mg, 4 mg, Intravenous, Q30 Min PRN, Jozef Becerra, DO    Sodium Chloride (PF) 0.9 % 10 mL, 10 mL, Intravenous, PRN, Jozef Becerra, DO    Current Outpatient Medications:     albuterol sulfate  (90 Base) MCG/ACT inhaler, Inhale 2 puffs 2 (Two) Times a Day As Needed for Shortness of Air., Disp: , Rfl:     atorvastatin (LIPITOR) 80 MG tablet, TAKE ONE TABLET BY MOUTH ONCE NIGHTLY, Disp: 90 tablet, Rfl: 3    buPROPion SR (WELLBUTRIN SR) 100 MG 12 hr tablet, Take 1 tablet by mouth Daily. (Patient not taking: Reported on 5/7/2025), Disp: , Rfl:     carvedilol (COREG) 6.25 MG tablet, TAKE 1 TABLET BY MOUTH 2 TIMES A DAY, Disp: 60 tablet, Rfl: 2    clonazePAM (KlonoPIN) 0.5 MG tablet, Take 1 tablet by mouth Daily As Needed for panic, Disp: 15 tablet, Rfl: 0    cloNIDine (CATAPRES) 0.1 MG tablet, TAKE ONE TABLET BY MOUTH EVERY 12 HOURS AS NEEDED FOR HIGH BLOOD PRESSURE ABOVE 180/90, Disp: 30 tablet, Rfl: 4    clopidogrel (PLAVIX) 75 MG tablet, TAKE 1 TABLET BY MOUTH DAILY, Disp: 90 tablet, Rfl: 3    dilTIAZem CD (CARDIZEM CD) 240 MG 24 hr capsule, TAKE 1 CAPSULE BY MOUTH DAILY, Disp: 30 capsule, Rfl: 11    ezetimibe (ZETIA) 10 MG tablet, TAKE 1 TABLET BY MOUTH DAILY, Disp: 30 tablet, Rfl: 6    FLUoxetine (PROzac) 20 MG capsule, Daily., Disp: , Rfl:     gabapentin (NEURONTIN) 100 MG capsule, Take 1 capsule every day by oral route as  needed for 30 days., Disp: , Rfl:     hydroCHLOROthiazide 25 MG tablet, TAKE 1 TABLET BY MOUTH DAILY . CAN TAKE 1/2 TO 1 TABLET DAILY IF NEEDED FOR LOWER EXTREMITY EDEMA, Disp: 30 tablet, Rfl: 11    losartan (COZAAR) 100 MG tablet, TAKE ONE TABLET BY MOUTH DAILY, Disp: 90 tablet, Rfl: 3    nitroglycerin (NITROSTAT) 0.4 MG SL tablet, 1 under the tongue as needed for angina, may repeat q5mins for up three doses, Disp: 25 tablet, Rfl: 11    ondansetron ODT (ZOFRAN-ODT) 4 MG disintegrating tablet, Take 1 tablet by mouth 4 (Four) Times a Day As Needed for Nausea or Vomiting., Disp: 15 tablet, Rfl: 0    ubrogepant (ubrogepant) 100 MG tablet, Take one as needed for headache, may repeat once in 2 hours, max 200 mg in 24 hours., Disp: 10 tablet, Rfl: 5    Viloxazine HCl  MG capsule sustained-release 24 hr, Take 3 capsules by mouth Every Morning., Disp: , Rfl:     vitamin D (ERGOCALCIFEROL) 1.25 MG (70025 UT) capsule capsule, Take 1 capsule by mouth Every 7 (Seven) Days., Disp: , Rfl:     ALLERGIES     Aspirin and Penicillins    FAMILY HISTORY       Family History   Problem Relation Age of Onset    Breast cancer Mother 76    Cancer Mother     Hypertension Mother     Cancer Father     Diabetes Father     Hypertension Father             Kidney disease Father             Cancer Brother             Diabetes Brother     Hypertension Brother     Diabetes Brother     Hypertension Brother     Heart failure Brother         Recently back in 2023    Ovarian cancer Neg Hx           SOCIAL HISTORY       Social History     Socioeconomic History    Marital status:    Tobacco Use    Smoking status: Never     Passive exposure: Never    Smokeless tobacco: Never    Tobacco comments:     Tried it in college, hated it never picked up the habit.   Vaping Use    Vaping status: Never Used   Substance and Sexual Activity    Alcohol use: Yes     Alcohol/week: 1.0 standard drink of alcohol     Types: 1 Glasses  of wine per week     Comment: Not every week just occasionally    Drug use: No    Sexual activity: Not Currently     Partners: Male     Birth control/protection: Condom, I.U.D., Depo-provera, Birth control pill, Pill, Implant, Injection     Comment: Have Mirena, but bloodwork shows that I'm in full menopause         PHYSICAL EXAM       Vitals:    07/13/25 1930 07/13/25 2000 07/13/25 2030 07/13/25 2100   BP:    (!) 164/137   Pulse: 75 79 73 80   Resp:       Temp:       TempSrc:       SpO2: 95% 97% 95% 96%   Weight:       Height:           Physical Exam  General : Patient is awake, alert, oriented, in no acute distress, nontoxic appearing  HEENT: Pupils are equally round, EOMI, conjunctivae clear  Neck: Neck is supple, full range of motion, trachea midline  Cardiac: Heart regular rate, rhythm, no murmurs, rubs, or gallops  Lungs: Lungs are clear to auscultation, there is no wheezing, rhonchi, or rales. There is no use of accessory muscles  Abdomen: Abdomen is soft, nondistended, there is tenderness along the periumbilical and left mid abdomen, no peritoneal signs examination.  Bowel sounds are present diffusely throughout.  Mild tenderness in the bilateral flank.  There are no firm or pulsatile masses, no rebound rigidity or guarding  Musculoskeletal: 5 out of 5 strength in all 4 extremities.  No focal muscle deficits are appreciated  Dermatology: Skin is warm and dry  Psych: Mentation is grossly normal, cognition is grossly normal. Affect is appropriate      DIAGNOSTIC RESULTS     EKG:  All EKGs are interpreted by the Emergency Department Physician who either signs or Co-signs this chart in the absence of a cardiologist.    Telemetry Scan   Final Result          RADIOLOGY:     [x] Radiologist's Report Reviewed:  CT Abdomen Pelvis With Contrast   Final Result   No acute abdominal or pelvic pathology.               Electronically Signed: Zaire Fuentes MD     7/13/2025 7:54 PM EDT     Workstation ID: [id number]          I  ordered and independently reviewed the above noted radiographic studies.      I viewed images of CT abdomen/pelvis which showed no acute intra-abdominal pathology per my independent interpretation.    See radiologist's dictation for official interpretation.        LABS:    I have reviewed and interpreted all of the currently available lab results from this visit (if applicable):  Results for orders placed or performed during the hospital encounter of 07/13/25   Comprehensive Metabolic Panel    Collection Time: 07/13/25  5:22 PM    Specimen: Blood   Result Value Ref Range    Glucose 158 (H) 65 - 99 mg/dL    BUN 9.2 6.0 - 20.0 mg/dL    Creatinine 0.86 0.57 - 1.00 mg/dL    Sodium 140 136 - 145 mmol/L    Potassium 4.0 3.5 - 5.2 mmol/L    Chloride 102 98 - 107 mmol/L    CO2 22.0 22.0 - 29.0 mmol/L    Calcium 9.3 8.6 - 10.5 mg/dL    Total Protein 7.5 6.0 - 8.5 g/dL    Albumin 4.2 3.5 - 5.2 g/dL    ALT (SGPT) 76 (H) 1 - 33 U/L    AST (SGOT) 68 (H) 1 - 32 U/L    Alkaline Phosphatase 121 (H) 39 - 117 U/L    Total Bilirubin 0.6 0.0 - 1.2 mg/dL    Globulin 3.3 gm/dL    A/G Ratio 1.3 g/dL    BUN/Creatinine Ratio 10.7 7.0 - 25.0    Anion Gap 16.0 (H) 5.0 - 15.0 mmol/L    eGFR 81.4 >60.0 mL/min/1.73   Lipase    Collection Time: 07/13/25  5:22 PM    Specimen: Blood   Result Value Ref Range    Lipase 34 13 - 60 U/L   hCG, Quantitative, Pregnancy    Collection Time: 07/13/25  5:22 PM    Specimen: Blood   Result Value Ref Range    HCG Quantitative 2.85 mIU/mL   Lactic Acid, Plasma    Collection Time: 07/13/25  5:22 PM    Specimen: Blood   Result Value Ref Range    Lactate 2.9 (C) 0.5 - 2.0 mmol/L   CBC Auto Differential    Collection Time: 07/13/25  5:22 PM    Specimen: Blood   Result Value Ref Range    WBC 12.19 (H) 3.40 - 10.80 10*3/mm3    RBC 5.53 (H) 3.77 - 5.28 10*6/mm3    Hemoglobin 16.7 (H) 12.0 - 15.9 g/dL    Hematocrit 49.1 (H) 34.0 - 46.6 %    MCV 88.8 79.0 - 97.0 fL    MCH 30.2 26.6 - 33.0 pg    MCHC 34.0 31.5 - 35.7 g/dL     RDW 12.4 12.3 - 15.4 %    RDW-SD 41.0 37.0 - 54.0 fl    MPV 9.7 6.0 - 12.0 fL    Platelets 348 140 - 450 10*3/mm3    Neutrophil % 71.6 42.7 - 76.0 %    Lymphocyte % 20.3 19.6 - 45.3 %    Monocyte % 6.6 5.0 - 12.0 %    Eosinophil % 0.9 0.3 - 6.2 %    Basophil % 0.3 0.0 - 1.5 %    Immature Grans % 0.3 0.0 - 0.5 %    Neutrophils, Absolute 8.71 (H) 1.70 - 7.00 10*3/mm3    Lymphocytes, Absolute 2.48 0.70 - 3.10 10*3/mm3    Monocytes, Absolute 0.81 0.10 - 0.90 10*3/mm3    Eosinophils, Absolute 0.11 0.00 - 0.40 10*3/mm3    Basophils, Absolute 0.04 0.00 - 0.20 10*3/mm3    Immature Grans, Absolute 0.04 0.00 - 0.05 10*3/mm3    nRBC 0.0 0.0 - 0.2 /100 WBC   Green Top (Gel)    Collection Time: 07/13/25  5:22 PM   Result Value Ref Range    Extra Tube Hold for add-ons.    Lavender Top    Collection Time: 07/13/25  5:22 PM   Result Value Ref Range    Extra Tube hold for add-on    Gold Top - SST    Collection Time: 07/13/25  5:22 PM   Result Value Ref Range    Extra Tube Hold for add-ons.    Gray Top    Collection Time: 07/13/25  5:22 PM   Result Value Ref Range    Extra Tube Hold for add-ons.    Light Blue Top    Collection Time: 07/13/25  5:22 PM   Result Value Ref Range    Extra Tube Hold for add-ons.    Urinalysis With Microscopic If Indicated (No Culture) - Urine, Clean Catch    Collection Time: 07/13/25  6:40 PM    Specimen: Urine, Clean Catch   Result Value Ref Range    Color, UA Yellow Yellow, Straw    Appearance, UA Clear Clear    pH, UA 6.5 5.0 - 8.0    Specific Gravity, UA 1.021 1.005 - 1.030    Glucose, UA Negative Negative    Ketones, UA Trace (A) Negative    Bilirubin, UA Negative Negative    Blood, UA Moderate (2+) (A) Negative    Protein, UA 30 mg/dL (1+) (A) Negative    Leuk Esterase, UA Trace (A) Negative    Nitrite, UA Negative Negative    Urobilinogen, UA 1.0 E.U./dL 0.2 - 1.0 E.U./dL   Urinalysis, Microscopic Only - Urine, Clean Catch    Collection Time: 07/13/25  6:40 PM    Specimen: Urine, Clean Catch    Result Value Ref Range    RBC, UA 21-50 (A) None Seen, 0-2 /HPF    WBC, UA 0-2 None Seen, 0-2 /HPF    Bacteria, UA None Seen None Seen /HPF    Squamous Epithelial Cells, UA 3-6 (A) None Seen, 0-2 /HPF    Hyaline Casts, UA 0-2 None Seen /LPF    Methodology Automated Microscopy    POC Urine Pregnancy    Collection Time: 07/13/25  6:44 PM    Specimen: Urine   Result Value Ref Range    HCG, Urine, QL Negative     Lot Number [id number]     Internal Positive Control Passed     Internal Negative Control Passed     Expiration Date 2026-10-17    STAT Lactic Acid, Reflex    Collection Time: 07/13/25  8:27 PM    Specimen: Blood   Result Value Ref Range    Lactate 1.7 0.5 - 2.0 mmol/L        If labs were ordered, I independently reviewed the results and considered them in treating the patient.      EMERGENCY DEPARTMENT COURSE and DIFFERENTIAL DIAGNOSIS/MDM:   Vitals:  AS OF 21:19 EDT    BP - (!) 164/137  HR - 80  TEMP - 98 °F (36.7 °C) (Oral)  O2 SATS - 96%      Orders placed during this visit:  Orders Placed This Encounter   Procedures    CT Abdomen Pelvis With Contrast    [place] Draw    Comprehensive Metabolic Panel    Lipase    Urinalysis With Microscopic If Indicated (No Culture) - Urine, Clean Catch    hCG, Quantitative, Pregnancy    Lactic Acid, Plasma    CBC Auto Differential    STAT Lactic Acid, Reflex    Urinalysis, Microscopic Only - Urine, Clean Catch    NPO Diet NPO Type: Strict NPO    Undress & Gown    POC Urine Pregnancy    Telemetry Scan    Insert Peripheral IV    CBC & Differential    Green Top (Gel)    Lavender Top    Gold Top - SST    Gray Top    Light Blue Top       All labs have been independently reviewed by me.  All radiology studies have been reviewed by me and the radiologist dictating the report.  All EKG's have been independently viewed and interpreted by me.      Discussion below represents my analysis of pertinent findings related to patient's condition, differential diagnosis, treatment plan and  final disposition.    Differential diagnosis:  The differential diagnosis associated with the patient's presentation includes: Pancreatitis, gallbladder disease, biliary obstruction, cholelithiasis, cholecystitis, gastritis, colitis, bowel obstruction, kidney stone    Additional sources  Discussed/ obtained information from independent historians:   [] Spouse  [] Parent  [] Family member  [x] Friend  [] EMS   [] Other:    External (non-ED) record review:   [] Inpatient record:   [] Office record:   [] Outpatient record:   [] Prior Outpatient labs:   [] Prior Outpatient radiology:   [] Primary Care record:   [] Outside ED record:   [] Other:     Patient's care impacted by:   [] Diabetes  [] Hypertension  [] CHF  [] Hyperlipidemia  [] Coronary Artery Disease   [] COPD   [] Cancer   [] Tobacco Abuse   [] Substance Abuse    [x] Other: History of pancreatitis    Care significantly affected by Social Determinants of Health (housing and economic circumstances, unemployment)    [] Yes     [x] No   If yes, Patient's care significantly limited by Social Determinants of Health including:   [] Inadequate housing   [] Low income   [] Alcoholism and drug addiction in family   [] Problems related to primary support group   [] Unemployment   [] Problems related to employment   [] Other Social Determinants of Health:       MEDICATIONS ADMINISTERED IN ED:  Medications   Sodium Chloride (PF) 0.9 % 10 mL (has no administration in time range)   Morphine sulfate (PF) injection 4 mg (has no administration in time range)   sodium chloride 0.9 % bolus 1,000 mL (0 mL Intravenous Stopped 7/13/25 2059)   ondansetron (ZOFRAN) injection 4 mg (4 mg Intravenous Given 7/13/25 1858)   iopamidol (ISOVUE-300) 61 % injection 85 mL (75 mL Intravenous Given 7/13/25 1932)   droperidol (INAPSINE) injection 2.5 mg (2.5 mg Intravenous Given 7/13/25 2113)              This is a 50-year-old female with ([place] abdominal pain, nausea vomiting that hour prior to  arrival.  Recent viral infection, [place] from [place].  She is nontoxic-appearing on initial assessment, some generalized periumbilical tenderness without any peritoneal signs on examination, bowel sounds are present throughout.  Patient referred pancreatitis, pancreatic cyst, no active chest pain, no shortness of breath.  We discussed obtaining blood work labs, imaging for further assessment occluding CT abdomen/pelvis with contrast.  Results as above.  Patient given IV fluids, symptomatic treatment.  Labs with a white count of 12, hemoglobin of 16, kidney function stable, LFTs without significant abnormality, no infectious etiology and urinalysis per initial lactic acid 2.9, after IV fluids recheck is 1.7.  The CT scan of the abdomen/pelvis does not reveal any acute intra-abdominal pathology, no signs of obstruction, patient was given IV fluids, droperidol for symptomatic relief.  I updated her on the findings, nothing acute, some potential inflammation from her recent COVID infection which we will treat with symptomatic treatments, good fluid hydration, gentle diet advancing as tolerated over the next few days, close follow-up with her PCP, return with any worsening symptoms or further concerns.    I had a discussion with the patient/family regarding diagnosis, diagnostic results, treatment plan, and medications.  The patient/family indicated understanding of these instructions.  I spent adequate time at the bedside preceding discharge necessary to personally discuss the aftercare instructions, giving patient education, providing explanations of the results of our evaluations/findings, and my decision making to assure that the patient/family understand the plan of care.  Time was allotted to answer questions at that time and throughout the ED course.  Emphasis was placed on timely follow-up after discharge.  I also discussed the potential for the development of an acute emergent condition requiring  further evaluation, admission, or even surgical intervention. I discussed that we found nothing during the visit today indicating the need for further workup, admission, or the presence of an unstable medical condition.  I encouraged the patient to return to the emergency department immediately for ANY concerns, worsening, new complaints, or if symptoms persist and unable to seek follow-up in a timely fashion.  The patient/family expressed understanding and agreement with this plan.  The patient will follow-up with their PCP in 1-2 days for reevaluation.     PROCEDURES:  Procedures    CRITICAL CARE TIME    Total Critical Care time was 0 minutes, excluding separately reportable procedures.   There was a high probability of clinically significant/life threatening deterioration in the patient's condition which required my urgent intervention.      FINAL IMPRESSION      1. Generalized abdominal pain    2. Nausea and vomiting, unspecified vomiting type          DISPOSITION/PLAN     ED Disposition       ED Disposition   Discharge    Condition   Stable    Comment   --               PATIENT REFERRED TO:  Jozef Cummings MD  [address]  [place] [facility] [address] [phone]    In 2 days      [facility] EMERGENCY DEPARTMENT  [address] [address] [phone]    If symptoms worsen      DISCHARGE MEDICATIONS:     Medication List        START taking these medications      ondansetron ODT 4 MG disintegrating tablet  Commonly known as: ZOFRAN-ODT  Take 1 tablet by mouth 4 (Four) Times a Day As Needed for Nausea or Vomiting.            CONTINUE taking these medications      albuterol sulfate  (90 Base) MCG/ACT inhaler  Commonly known as: PROVENTIL HFA;VENTOLIN HFA;PROAIR HFA     atorvastatin 80 MG tablet  Commonly known as: LIPITOR  TAKE ONE TABLET BY MOUTH ONCE NIGHTLY     buPROPion  MG 12 hr tablet  Commonly known as: WELLBUTRIN SR     carvedilol 6.25 MG  tablet  Commonly known as: COREG  TAKE 1 TABLET BY MOUTH 2 TIMES A DAY     clonazePAM 0.5 MG tablet  Commonly known as: KlonoPIN  Take 1 tablet by mouth Daily As Needed for panic     cloNIDine 0.1 MG tablet  Commonly known as: CATAPRES  TAKE ONE TABLET BY MOUTH EVERY 12 HOURS AS NEEDED FOR HIGH BLOOD PRESSURE ABOVE 180/90     clopidogrel 75 MG tablet  Commonly known as: PLAVIX  TAKE 1 TABLET BY MOUTH DAILY     dilTIAZem  MG 24 hr capsule  Commonly known as: CARDIZEM CD  TAKE 1 CAPSULE BY MOUTH DAILY     ezetimibe 10 MG tablet  Commonly known as: ZETIA  TAKE 1 TABLET BY MOUTH DAILY     FLUoxetine 20 MG capsule  Commonly known as: PROzac     gabapentin 100 MG capsule  Commonly known as: NEURONTIN     hydroCHLOROthiazide 25 MG tablet  TAKE 1 TABLET BY MOUTH DAILY . CAN TAKE 1/2 TO 1 TABLET DAILY IF NEEDED FOR LOWER EXTREMITY EDEMA     losartan 100 MG tablet  Commonly known as: COZAAR  TAKE ONE TABLET BY MOUTH DAILY     nitroglycerin 0.4 MG SL tablet  Commonly known as: NITROSTAT  1 under the tongue as needed for angina, may repeat q5mins for up three doses     Ubrelvy 100 MG tablet  Generic drug: ubrogepant  Take one as needed for headache, may repeat once in 2 hours, max 200 mg in 24 hours.     Viloxazine HCl  MG capsule sustained-release 24 hr     vitamin D 1.25 MG (37274 UT) capsule capsule  Commonly known as: ERGOCALCIFEROL               Where to Get Your Medications        These medications were sent to [facility] [id number] [address] [phone]  - [phone]   [address] [facility] [address]      Phone: [phone]   ondansetron ODT 4 MG disintegrating tablet             Comment: Please note this report has been produced using speech recognition software.      Jozef Becerra DO  Attending Emergency Physician         Jozef Becerra DO  07/13/25 7362

## 2025-07-14 RX ORDER — CLONIDINE HYDROCHLORIDE 0.1 MG/1
TABLET ORAL
Qty: 30 TABLET | Refills: 4 | Status: SHIPPED | OUTPATIENT
Start: 2025-07-14

## 2025-08-06 ENCOUNTER — TRANSCRIBE ORDERS (OUTPATIENT)
Dept: LAB | Facility: HOSPITAL | Age: 50
End: 2025-08-06
Payer: COMMERCIAL

## 2025-08-06 ENCOUNTER — LAB (OUTPATIENT)
Dept: LAB | Facility: HOSPITAL | Age: 50
End: 2025-08-06
Payer: COMMERCIAL

## 2025-08-06 DIAGNOSIS — Z01.419 ROUTINE GYNECOLOGICAL EXAMINATION: ICD-10-CM

## 2025-08-06 DIAGNOSIS — Z01.419 ROUTINE GYNECOLOGICAL EXAMINATION: Primary | ICD-10-CM

## 2025-08-06 PROCEDURE — 83001 ASSAY OF GONADOTROPIN (FSH): CPT

## 2025-08-06 PROCEDURE — 82670 ASSAY OF TOTAL ESTRADIOL: CPT

## 2025-08-06 PROCEDURE — 36415 COLL VENOUS BLD VENIPUNCTURE: CPT

## 2025-08-07 ENCOUNTER — TRANSCRIBE ORDERS (OUTPATIENT)
Dept: ADMINISTRATIVE | Facility: HOSPITAL | Age: 50
End: 2025-08-07
Payer: COMMERCIAL

## 2025-08-07 DIAGNOSIS — I10 ESSENTIAL HYPERTENSION: ICD-10-CM

## 2025-08-07 DIAGNOSIS — R59.9 SWOLLEN LYMPH NODES: Primary | ICD-10-CM

## 2025-08-07 LAB
ESTRADIOL SERPL HS-MCNC: <5 PG/ML
FSH SERPL-ACNC: 58.9 MIU/ML

## 2025-08-07 RX ORDER — CARVEDILOL 6.25 MG/1
6.25 TABLET ORAL 2 TIMES DAILY
Qty: 180 TABLET | Refills: 1 | Status: SHIPPED | OUTPATIENT
Start: 2025-08-07

## 2025-08-12 DIAGNOSIS — I10 ESSENTIAL HYPERTENSION: ICD-10-CM

## 2025-08-12 RX ORDER — CARVEDILOL 6.25 MG/1
6.25 TABLET ORAL 2 TIMES DAILY
Qty: 180 TABLET | Refills: 1 | Status: SHIPPED | OUTPATIENT
Start: 2025-08-12

## 2025-08-15 ENCOUNTER — TELEPHONE (OUTPATIENT)
Dept: CARDIOLOGY | Facility: CLINIC | Age: 50
End: 2025-08-15
Payer: COMMERCIAL

## 2025-08-18 DIAGNOSIS — I25.10 CORONARY ARTERY DISEASE INVOLVING NATIVE HEART, UNSPECIFIED VESSEL OR LESION TYPE, UNSPECIFIED WHETHER ANGINA PRESENT: ICD-10-CM

## 2025-08-18 DIAGNOSIS — E78.5 HYPERLIPIDEMIA LDL GOAL <70: ICD-10-CM

## 2025-08-18 RX ORDER — EZETIMIBE 10 MG/1
10 TABLET ORAL DAILY
Qty: 30 TABLET | Refills: 6 | Status: SHIPPED | OUTPATIENT
Start: 2025-08-18

## (undated) DEVICE — DEV INFL MONARCH 25W

## (undated) DEVICE — GUIDELINER CATHETERS ARE INTENDED TO BE USED IN CONJUNCTION WITH GUIDE CATHETERS TO ACCESS DISCRETE REGIONS OF THE CORONARY AND/OR PERIPHERAL VASCULATURE, AND TO FACILITATE PLACEMENT OF INTERVENTIONAL DEVICES.: Brand: GUIDELINER® V3 CATHETER

## (undated) DEVICE — PINNACLE INTRODUCER SHEATH: Brand: PINNACLE

## (undated) DEVICE — HI-TORQUE PILOT 150 GUIDE WIRE .014 STRAIGHT TIP 3.0 CM X 190 CM: Brand: HI-TORQUE PILOT

## (undated) DEVICE — CATH DIAG EXPO M/ PK 6FR FL4/FR4 PIG 3PK

## (undated) DEVICE — MINI TREK CORONARY DILATATION CATHETER 2.0 MM X 15 MM / RAPID-EXCHANGE: Brand: MINI TREK

## (undated) DEVICE — HI-TORQUE WHISPER MS GUIDE WIRE .014 STRAIGHT TIP 3.0 CM X 190 CM: Brand: HI-TORQUE WHISPER

## (undated) DEVICE — NDL PERC 1PRT THNWALL W/BASEPLT 18G 7CM

## (undated) DEVICE — NC TREK CORONARY DILATATION CATHETER 3.25 MM X 15 MM / RAPID-EXCHANGE: Brand: NC TREK

## (undated) DEVICE — GUIDE CATHETER: Brand: MACH1™

## (undated) DEVICE — GW PERIPH VASC ADX J/TP SS .035 150CM 3MM

## (undated) DEVICE — PK CATH CARD 10

## (undated) DEVICE — GW LUGE .014 182 CM